# Patient Record
Sex: FEMALE | Race: WHITE | NOT HISPANIC OR LATINO | Employment: OTHER | URBAN - METROPOLITAN AREA
[De-identification: names, ages, dates, MRNs, and addresses within clinical notes are randomized per-mention and may not be internally consistent; named-entity substitution may affect disease eponyms.]

---

## 2017-02-06 ENCOUNTER — OFFICE VISIT (OUTPATIENT)
Dept: INTERNAL MEDICINE | Facility: CLINIC | Age: 82
End: 2017-02-06

## 2017-02-06 VITALS
TEMPERATURE: 98.1 F | DIASTOLIC BLOOD PRESSURE: 78 MMHG | OXYGEN SATURATION: 95 % | BODY MASS INDEX: 24.41 KG/M2 | HEIGHT: 64 IN | SYSTOLIC BLOOD PRESSURE: 128 MMHG | WEIGHT: 143 LBS | HEART RATE: 90 BPM | RESPIRATION RATE: 16 BRPM

## 2017-02-06 DIAGNOSIS — J30.2 SEASONAL ALLERGIC RHINITIS, UNSPECIFIED ALLERGIC RHINITIS TRIGGER: ICD-10-CM

## 2017-02-06 DIAGNOSIS — R73.01 IMPAIRED FASTING GLUCOSE: ICD-10-CM

## 2017-02-06 DIAGNOSIS — G47.09 OTHER INSOMNIA: ICD-10-CM

## 2017-02-06 DIAGNOSIS — I10 BENIGN ESSENTIAL HYPERTENSION: Primary | ICD-10-CM

## 2017-02-06 DIAGNOSIS — R73.03 PREDIABETES: ICD-10-CM

## 2017-02-06 DIAGNOSIS — K21.9 GASTROESOPHAGEAL REFLUX DISEASE WITHOUT ESOPHAGITIS: ICD-10-CM

## 2017-02-06 DIAGNOSIS — M85.80 OSTEOPENIA: ICD-10-CM

## 2017-02-06 DIAGNOSIS — M85.9 DISORDER OF BONE DENSITY AND STRUCTURE, UNSPECIFIED: ICD-10-CM

## 2017-02-06 DIAGNOSIS — F41.8 DEPRESSION WITH ANXIETY: ICD-10-CM

## 2017-02-06 PROBLEM — N81.10 BLADDER PROLAPSE, FEMALE, ACQUIRED: Status: ACTIVE | Noted: 2017-02-06

## 2017-02-06 PROBLEM — N28.1 RENAL CYST, LEFT: Status: ACTIVE | Noted: 2017-02-06

## 2017-02-06 LAB
ALBUMIN SERPL-MCNC: 4.7 G/DL (ref 3.5–5.2)
ALBUMIN/GLOB SERPL: 1.9 G/DL
ALP SERPL-CCNC: 82 U/L (ref 40–129)
ALT SERPL-CCNC: 13 U/L (ref 5–33)
AST SERPL-CCNC: 17 U/L (ref 5–32)
BASOPHILS # BLD AUTO: 0.03 10*3/MM3 (ref 0–0.2)
BASOPHILS NFR BLD AUTO: 0.5 % (ref 0–2)
BILIRUB SERPL-MCNC: 0.5 MG/DL (ref 0.2–1.2)
BUN SERPL-MCNC: 11 MG/DL (ref 8–23)
BUN/CREAT SERPL: 13.9 (ref 7–25)
CALCIUM SERPL-MCNC: 9.6 MG/DL (ref 8.8–10.5)
CHLORIDE SERPL-SCNC: 98 MMOL/L (ref 98–107)
CHOLEST SERPL-MCNC: 167 MG/DL (ref 0–200)
CHOLEST/HDLC SERPL: 1.99 {RATIO}
CO2 SERPL-SCNC: 28.4 MMOL/L (ref 22–29)
CREAT SERPL-MCNC: 0.79 MG/DL (ref 0.57–1)
EOSINOPHIL # BLD AUTO: 0.13 10*3/MM3 (ref 0.1–0.3)
EOSINOPHIL NFR BLD AUTO: 2.2 % (ref 0–4)
ERYTHROCYTE [DISTWIDTH] IN BLOOD BY AUTOMATED COUNT: 13.7 % (ref 11.5–14.5)
GLOBULIN SER CALC-MCNC: 2.5 GM/DL
GLUCOSE SERPL-MCNC: 101 MG/DL (ref 65–99)
HBA1C MFR BLD: 5.5 % (ref 4.8–5.6)
HCT VFR BLD AUTO: 44.2 % (ref 37–47)
HDLC SERPL-MCNC: 84 MG/DL (ref 40–60)
HGB BLD-MCNC: 14 G/DL (ref 12–16)
IMM GRANULOCYTES # BLD: 0 10*3/MM3 (ref 0–0.03)
IMM GRANULOCYTES NFR BLD: 0 % (ref 0–0.5)
LDLC SERPL CALC-MCNC: 69 MG/DL (ref 0–100)
LYMPHOCYTES # BLD AUTO: 2 10*3/MM3 (ref 0.6–4.8)
LYMPHOCYTES NFR BLD AUTO: 33.7 % (ref 20–45)
MCH RBC QN AUTO: 28.2 PG (ref 27–31)
MCHC RBC AUTO-ENTMCNC: 31.7 G/DL (ref 31–37)
MCV RBC AUTO: 89.1 FL (ref 81–99)
MONOCYTES # BLD AUTO: 0.51 10*3/MM3 (ref 0–1)
MONOCYTES NFR BLD AUTO: 8.6 % (ref 3–8)
NEUTROPHILS # BLD AUTO: 3.27 10*3/MM3 (ref 1.5–8.3)
NEUTROPHILS NFR BLD AUTO: 55 % (ref 45–70)
NRBC BLD AUTO-RTO: 0 /100 WBC (ref 0–0)
PLATELET # BLD AUTO: 298 10*3/MM3 (ref 140–500)
POTASSIUM SERPL-SCNC: 3.7 MMOL/L (ref 3.5–5.2)
PROT SERPL-MCNC: 7.2 G/DL (ref 6–8.5)
RBC # BLD AUTO: 4.96 10*6/MM3 (ref 4.2–5.4)
SODIUM SERPL-SCNC: 141 MMOL/L (ref 136–145)
TRIGL SERPL-MCNC: 70 MG/DL (ref 0–150)
TSH SERPL DL<=0.005 MIU/L-ACNC: 0.87 MIU/ML (ref 0.27–4.2)
VIT B12 SERPL-MCNC: 641 PG/ML (ref 211–946)
VLDLC SERPL CALC-MCNC: 14 MG/DL (ref 7–27)
WBC # BLD AUTO: 5.94 10*3/MM3 (ref 4.8–10.8)

## 2017-02-06 PROCEDURE — 99214 OFFICE O/P EST MOD 30 MIN: CPT | Performed by: FAMILY MEDICINE

## 2017-02-06 RX ORDER — FLUOXETINE 20 MG/1
20 TABLET, FILM COATED ORAL DAILY
Qty: 30 TABLET | Refills: 2 | Status: SHIPPED | OUTPATIENT
Start: 2017-02-06 | End: 2017-05-07

## 2017-02-06 RX ORDER — PEPPERMINT OIL
1 OIL (ML) MISCELLANEOUS AS NEEDED
COMMUNITY
End: 2018-02-12

## 2017-02-06 RX ORDER — MULTIVITAMIN
1 TABLET ORAL
COMMUNITY
End: 2017-02-06

## 2017-02-06 RX ORDER — RANITIDINE 150 MG/1
150 TABLET ORAL DAILY
COMMUNITY
End: 2017-06-30 | Stop reason: SDUPTHER

## 2017-02-06 RX ORDER — AMLODIPINE BESYLATE 10 MG/1
10 TABLET ORAL
COMMUNITY
End: 2017-09-25

## 2017-02-06 RX ORDER — FLUTICASONE PROPIONATE 50 MCG
2 SPRAY, SUSPENSION (ML) NASAL
COMMUNITY
Start: 2016-04-07 | End: 2017-02-06 | Stop reason: SDUPTHER

## 2017-02-06 RX ORDER — ZOLPIDEM TARTRATE 10 MG/1
TABLET ORAL
COMMUNITY
Start: 2016-12-13 | End: 2017-02-06 | Stop reason: SDUPTHER

## 2017-02-06 RX ORDER — LORAZEPAM 1 MG/1
1 TABLET ORAL EVERY 24 HOURS
COMMUNITY
Start: 2016-08-02 | End: 2017-03-06

## 2017-02-06 NOTE — PROGRESS NOTES
Subjective     Brittny Fay is a 82 y.o. female, who presents with a chief complaint of   Chief Complaint   Patient presents with   • Establish Care   • Hypertension   • Prediabetes   • Insomnia   • Depression       HPI     1. HTN.  She has been treated for HTN since about age 42.    2. Insomnia.  She alternates zolpidem and lorazepam at night when she develops some tolerance.  She has struggled with insomnia for years.  Trazodone and Lunesta didn't work well.    3. Depression with anxiety.  She has a lot of anxiety, mostly at night.  She took Paxil years ago.  She has had anxiety for years.  Denies SI.    The following portions of the patient's history were reviewed and updated as appropriate: allergies, current medications, past family history, past medical history, past social history, past surgical history and problem list.    Allergies: Amitriptyline; Bupropion; and Pneumococcal vaccines    Review of Systems   Constitutional: Negative.    HENT: Negative.    Eyes: Negative.    Respiratory: Negative.    Cardiovascular: Negative.    Gastrointestinal: Negative.    Endocrine: Negative.    Genitourinary: Negative.    Musculoskeletal: Negative.    Skin: Negative.    Allergic/Immunologic: Positive for environmental allergies.   Neurological: Negative.    Psychiatric/Behavioral: Positive for sleep disturbance. The patient is nervous/anxious.        Objective     Wt Readings from Last 3 Encounters:   02/06/17 143 lb (64.9 kg)   01/23/17 140 lb (63.5 kg)   10/03/16 143 lb (64.9 kg)     Temp Readings from Last 3 Encounters:   02/06/17 98.1 °F (36.7 °C) (Oral)   01/23/17 97.3 °F (36.3 °C) (Oral)   08/29/16 97.7 °F (36.5 °C) (Oral)     BP Readings from Last 3 Encounters:   02/06/17 128/78   01/23/17 144/88   08/29/16 142/84     Pulse Readings from Last 3 Encounters:   02/06/17 90   01/23/17 85   08/29/16 87     Body mass index is 24.55 kg/(m^2).  SpO2 Readings from Last 3 Encounters:   02/06/17 95%   01/23/17 96%    08/29/16 95%       Physical Exam   Constitutional: She is oriented to person, place, and time. She appears well-developed and well-nourished.   HENT:   Head: Normocephalic.   Mouth/Throat: Oropharynx is clear and moist.   Eyes: Conjunctivae and EOM are normal. Pupils are equal, round, and reactive to light.   Neck: Normal range of motion. Neck supple. No thyromegaly present.   Cardiovascular: Normal rate, regular rhythm and normal heart sounds.    Pulmonary/Chest: Effort normal and breath sounds normal.   Abdominal: Soft. Bowel sounds are normal. There is no hepatosplenomegaly.   Musculoskeletal: Normal range of motion. She exhibits no edema.   Lymphadenopathy:     She has no cervical adenopathy.   Neurological: She is alert and oriented to person, place, and time.   Skin: Skin is warm and dry. No rash noted.   Psychiatric: She has a normal mood and affect. Her behavior is normal.   Vitals reviewed.        Assessment/Plan   Brittny was seen today for establish care, hypertension, prediabetes, insomnia and depression.    Diagnoses and all orders for this visit:    Benign essential hypertension  -     Comprehensive Metabolic Panel  -     TSH  -     Lipid Panel With / Chol / HDL Ratio    Other insomnia    Seasonal allergic rhinitis, unspecified allergic rhinitis trigger    Depression with anxiety  -     CBC & Differential  -     FLUoxetine (PROzac) 20 MG tablet; Take 1 tablet by mouth Daily for 90 days.    Osteopenia  -     DEXA Bone Density Axial; Future    Disorder of bone density and structure, unspecified   -     DEXA Bone Density Axial; Future    Prediabetes  -     CBC & Differential  -     Hemoglobin A1c  -     Lipid Panel With / Chol / HDL Ratio  -     Vitamin B12    Gastroesophageal reflux disease without esophagitis    Prediabetes   -     CBC & Differential  -     Hemoglobin A1c  -     Lipid Panel With / Chol / HDL Ratio  -     Vitamin B12    Impaired fasting glucose   -     Hemoglobin A1c    1. HTN.   Controlled.  Labs ordered.    2. Insomnia.  Controlled.  Continue same.  Med management agreement and Carlos obtained.    3. TWIN.  Controlled with nasal steroid.    4. Depression with anxiety. Add fluoxetine 20 mg daily.  Anticipatory guidance given.    5. Osteopenia.  Due for dexa scan.  Continue calcium supplement.    6. Prediabetes.  Lifestyle measures. Check labs today.    7. GERD.  Controlled with ranitidine.  0      Outpatient Medications Prior to Visit   Medication Sig Dispense Refill   • amLODIPine (NORVASC) 10 MG tablet Take 1 tablet by mouth Every Morning. Indications: High Blood Pressure 90 tablet 0   • Estriol powder Compounding pharmacy     • fluticasone (FLONASE) 50 MCG/ACT nasal spray 2 sprays into each nostril daily. 1 each 2   • LORazepam (ATIVAN) 1 MG tablet Take 1 tablet by mouth Daily As Needed for anxiety. 90 tablet 0   • MYRBETRIQ 25 MG tablet sustained-release 24 hour Take 1 tablet by mouth daily.     • zolpidem (AMBIEN) 10 MG tablet Take 1 tablet by mouth At Night As Needed for sleep. For insomnia 90 tablet 0   • doxycycline (VIBRAMYCIN) 100 MG capsule 1 po bid 20 capsule 0     No facility-administered medications prior to visit.      New Medications Ordered This Visit   Medications   • FLUoxetine (PROzac) 20 MG tablet     Sig: Take 1 tablet by mouth Daily for 90 days.     Dispense:  30 tablet     Refill:  2     [unfilled]  Medications Discontinued During This Encounter   Medication Reason   • fluticasone (FLONASE) 50 MCG/ACT nasal spray Duplicate order   • Mirabegron ER 25 MG tablet sustained-release 24 hour Duplicate order   • zolpidem (AMBIEN) 10 MG tablet Duplicate order   • doxycycline (VIBRAMYCIN) 100 MG capsule    • Multiple Vitamin (MULTIVITAMIN) tablet          Return in about 3 months (around 5/6/2017).

## 2017-02-08 DIAGNOSIS — M85.88 OSTEOPENIA OF SPINE: Primary | ICD-10-CM

## 2017-02-10 ENCOUNTER — TELEPHONE (OUTPATIENT)
Dept: INTERNAL MEDICINE | Facility: CLINIC | Age: 82
End: 2017-02-10

## 2017-02-10 NOTE — TELEPHONE ENCOUNTER
Patient advised.  sj    ----- Message from Josie Kapadia MA sent at 2/8/2017  8:07 AM EST -----      ----- Message -----     From: Remedios Sorto MD     Sent: 2/6/2017   2:26 PM       To: [facility] Gonzales[address] [place] [address]    Please call the patient regarding her result.  Labs are normal including A1c.  Thanks.

## 2017-02-17 ENCOUNTER — HOSPITAL ENCOUNTER (OUTPATIENT)
Dept: BONE DENSITY | Facility: HOSPITAL | Age: 82
Discharge: HOME | End: 2017-02-17
Attending: FAMILY MEDICINE | Admitting: FAMILY MEDICINE

## 2017-02-17 PROCEDURE — 77080 DXA BONE DENSITY AXIAL: CPT

## 2017-02-27 ENCOUNTER — TELEPHONE (OUTPATIENT)
Dept: INTERNAL MEDICINE | Facility: CLINIC | Age: 82
End: 2017-02-27

## 2017-02-27 NOTE — TELEPHONE ENCOUNTER
Patient had problems with [employer] email system.  I referred her to the [employer] help line.     ----- Message from Lima Contreras sent at 2017  3:24 PM EST -----  Regarding: QUESTION  Contact: [phone]  :  39    AURY PATIENT    TALKED TO MORENO NÚÑEZ ABOUT LAB RESULTS AND HAS QUESTIONS. PLEASE CALL.

## 2017-03-06 ENCOUNTER — TELEPHONE (OUTPATIENT)
Dept: INTERNAL MEDICINE | Facility: CLINIC | Age: 82
End: 2017-03-06

## 2017-03-06 DIAGNOSIS — F41.1 GENERALIZED ANXIETY DISORDER: ICD-10-CM

## 2017-03-06 RX ORDER — LORAZEPAM 1 MG/1
1 TABLET ORAL DAILY PRN
Qty: 90 TABLET | Refills: 1 | Status: SHIPPED | OUTPATIENT
Start: 2017-03-06 | End: 2017-08-09 | Stop reason: SDUPTHER

## 2017-03-06 NOTE — TELEPHONE ENCOUNTER
----- Message from Gladis Garner MA sent at 3/6/2017  8:52 AM EST -----   LOV  2/6/17  NARC AND BRYAN     ----- Message -----     From: Rula Dye     Sent: 3/6/2017   8:27 AM       To: [facility]    RX REFILL    LORAZEPAM 1MG  #90  LAST FILL DATE EARYL December  PT HAS 10 PILLS LEFT  HARJIT JEFFERS  CALL BACK [phone]    PT USES [facility], PHONE NUMBER [phone].     FAXED TO   [facility],   NEXT  OV  5/10/17

## 2017-03-30 ENCOUNTER — TELEPHONE (OUTPATIENT)
Dept: INTERNAL MEDICINE | Facility: CLINIC | Age: 82
End: 2017-03-30

## 2017-03-30 RX ORDER — AMLODIPINE BESYLATE 10 MG/1
10 TABLET ORAL EVERY MORNING
Qty: 90 TABLET | Refills: 1 | Status: SHIPPED | OUTPATIENT
Start: 2017-03-30 | End: 2017-03-30 | Stop reason: SDUPTHER

## 2017-03-30 RX ORDER — AMLODIPINE BESYLATE 10 MG/1
10 TABLET ORAL EVERY MORNING
Qty: 90 TABLET | Refills: 1 | Status: SHIPPED | OUTPATIENT
Start: 2017-03-30 | End: 2017-05-10

## 2017-03-30 NOTE — TELEPHONE ENCOUNTER
----- Message from Rula Dye sent at 3/30/2017  8:05 AM EDT -----  rx refill    amlodipine 10 mg  #90  Last fill date 1/10/2017  Has 10 pills left  Autumn jett  Call back is [phone]      SENT TO [facility]

## 2017-03-30 NOTE — TELEPHONE ENCOUNTER
----- Message from Lima Contreras sent at 3/30/2017  2:56 PM EDT -----  Regarding: DIFFERENT PHARMACY  Contact: [phone]  :  39  AURY PATIENT    PATIENT NEEDS AMLODIPINE, 10 MG TABLET, SENT TO [facility]. QUANTITY OF 90      done

## 2017-05-10 ENCOUNTER — OFFICE VISIT (OUTPATIENT)
Dept: INTERNAL MEDICINE | Facility: CLINIC | Age: 82
End: 2017-05-10

## 2017-05-10 VITALS
BODY MASS INDEX: 24.89 KG/M2 | WEIGHT: 145 LBS | HEART RATE: 68 BPM | TEMPERATURE: 98.7 F | SYSTOLIC BLOOD PRESSURE: 148 MMHG | OXYGEN SATURATION: 98 % | DIASTOLIC BLOOD PRESSURE: 82 MMHG

## 2017-05-10 DIAGNOSIS — I10 BENIGN ESSENTIAL HYPERTENSION: Primary | ICD-10-CM

## 2017-05-10 DIAGNOSIS — N32.81 OVERACTIVE BLADDER: ICD-10-CM

## 2017-05-10 DIAGNOSIS — F41.8 DEPRESSION WITH ANXIETY: ICD-10-CM

## 2017-05-10 DIAGNOSIS — M85.80 OSTEOPENIA: ICD-10-CM

## 2017-05-10 DIAGNOSIS — J30.2 SEASONAL ALLERGIC RHINITIS, UNSPECIFIED ALLERGIC RHINITIS TRIGGER: ICD-10-CM

## 2017-05-10 DIAGNOSIS — G47.00 INSOMNIA, UNSPECIFIED TYPE: ICD-10-CM

## 2017-05-10 DIAGNOSIS — K21.9 GASTROESOPHAGEAL REFLUX DISEASE WITHOUT ESOPHAGITIS: ICD-10-CM

## 2017-05-10 DIAGNOSIS — N28.1 RENAL CYST, LEFT: ICD-10-CM

## 2017-05-10 PROCEDURE — 99214 OFFICE O/P EST MOD 30 MIN: CPT | Performed by: FAMILY MEDICINE

## 2017-05-15 ENCOUNTER — RESULTS ENCOUNTER (OUTPATIENT)
Dept: INTERNAL MEDICINE | Facility: CLINIC | Age: 82
End: 2017-05-15

## 2017-05-15 DIAGNOSIS — K21.9 GASTROESOPHAGEAL REFLUX DISEASE WITHOUT ESOPHAGITIS: ICD-10-CM

## 2017-05-15 DIAGNOSIS — I10 BENIGN ESSENTIAL HYPERTENSION: ICD-10-CM

## 2017-05-19 ENCOUNTER — HOSPITAL ENCOUNTER (OUTPATIENT)
Dept: ULTRASOUND IMAGING | Facility: HOSPITAL | Age: 82
Discharge: HOME | End: 2017-05-19
Attending: FAMILY MEDICINE | Admitting: FAMILY MEDICINE

## 2017-05-19 ENCOUNTER — APPOINTMENT (OUTPATIENT)
Dept: ULTRASOUND IMAGING | Facility: HOSPITAL | Age: 82
End: 2017-05-19
Attending: FAMILY MEDICINE

## 2017-05-19 DIAGNOSIS — N28.1 RENAL CYST, LEFT: ICD-10-CM

## 2017-05-19 PROCEDURE — 76775 US EXAM ABDO BACK WALL LIM: CPT

## 2017-05-22 ENCOUNTER — TELEPHONE (OUTPATIENT)
Dept: INTERNAL MEDICINE | Facility: CLINIC | Age: 82
End: 2017-05-22

## 2017-05-23 ENCOUNTER — TELEPHONE (OUTPATIENT)
Dept: INTERNAL MEDICINE | Facility: CLINIC | Age: 82
End: 2017-05-23

## 2017-06-30 RX ORDER — RANITIDINE 150 MG/1
150 TABLET ORAL DAILY
Qty: 90 TABLET | Refills: 3 | Status: SHIPPED | OUTPATIENT
Start: 2017-06-30 | End: 2017-07-11 | Stop reason: SDUPTHER

## 2017-07-05 ENCOUNTER — TELEPHONE (OUTPATIENT)
Dept: INTERNAL MEDICINE | Facility: CLINIC | Age: 82
End: 2017-07-05

## 2017-07-05 NOTE — TELEPHONE ENCOUNTER
Pt called to say that her [facility] said that her insurance would not cover Xantac 150mg but if the RX was written for 300mg the insurance will pay. She said that she could cut them into. You can get them over the counter but they are so much more expensive than if she could get insurance to pay.dg  Can you write her a Rx.Thanks   sally

## 2017-07-11 RX ORDER — RANITIDINE 300 MG/1
300 TABLET ORAL DAILY
Qty: 90 TABLET | Refills: 3 | Status: SHIPPED | OUTPATIENT
Start: 2017-07-11 | End: 2017-11-07

## 2017-08-02 LAB
ALBUMIN SERPL-MCNC: 4.5 G/DL (ref 3.5–5.2)
ALBUMIN/GLOB SERPL: 2 G/DL
ALP SERPL-CCNC: 81 U/L (ref 40–129)
ALT SERPL-CCNC: 17 U/L (ref 5–33)
AST SERPL-CCNC: 20 U/L (ref 5–32)
BASOPHILS # BLD AUTO: 0.06 10*3/MM3 (ref 0–0.2)
BASOPHILS NFR BLD AUTO: 0.9 % (ref 0–2)
BILIRUB SERPL-MCNC: 0.5 MG/DL (ref 0.2–1.2)
BUN SERPL-MCNC: 18 MG/DL (ref 8–23)
BUN/CREAT SERPL: 21.7 (ref 7–25)
CALCIUM SERPL-MCNC: 9.1 MG/DL (ref 8.8–10.5)
CHLORIDE SERPL-SCNC: 101 MMOL/L (ref 98–107)
CHOLEST SERPL-MCNC: 192 MG/DL (ref 0–200)
CHOLEST/HDLC SERPL: 2.56 {RATIO}
CO2 SERPL-SCNC: 27.8 MMOL/L (ref 22–29)
CREAT SERPL-MCNC: 0.83 MG/DL (ref 0.57–1)
EOSINOPHIL # BLD AUTO: 0.2 10*3/MM3 (ref 0.1–0.3)
EOSINOPHIL NFR BLD AUTO: 3.1 % (ref 0–4)
ERYTHROCYTE [DISTWIDTH] IN BLOOD BY AUTOMATED COUNT: 14.1 % (ref 11.5–14.5)
GLOBULIN SER CALC-MCNC: 2.2 GM/DL
GLUCOSE SERPL-MCNC: 107 MG/DL (ref 65–99)
HCT VFR BLD AUTO: 44.5 % (ref 37–47)
HDLC SERPL-MCNC: 75 MG/DL (ref 40–60)
HGB BLD-MCNC: 14.2 G/DL (ref 12–16)
IMM GRANULOCYTES # BLD: 0.01 10*3/MM3 (ref 0–0.03)
IMM GRANULOCYTES NFR BLD: 0.2 % (ref 0–0.5)
LDLC SERPL CALC-MCNC: 102 MG/DL (ref 0–100)
LYMPHOCYTES # BLD AUTO: 2.57 10*3/MM3 (ref 0.6–4.8)
LYMPHOCYTES NFR BLD AUTO: 39.8 % (ref 20–45)
MCH RBC QN AUTO: 28.3 PG (ref 27–31)
MCHC RBC AUTO-ENTMCNC: 31.9 G/DL (ref 31–37)
MCV RBC AUTO: 88.6 FL (ref 81–99)
MONOCYTES # BLD AUTO: 0.59 10*3/MM3 (ref 0–1)
MONOCYTES NFR BLD AUTO: 9.1 % (ref 3–8)
NEUTROPHILS # BLD AUTO: 3.02 10*3/MM3 (ref 1.5–8.3)
NEUTROPHILS NFR BLD AUTO: 46.9 % (ref 45–70)
NRBC BLD AUTO-RTO: 0 /100 WBC (ref 0–0)
PLATELET # BLD AUTO: 297 10*3/MM3 (ref 140–500)
POTASSIUM SERPL-SCNC: 4 MMOL/L (ref 3.5–5.2)
PROT SERPL-MCNC: 6.7 G/DL (ref 6–8.5)
RBC # BLD AUTO: 5.02 10*6/MM3 (ref 4.2–5.4)
SODIUM SERPL-SCNC: 143 MMOL/L (ref 136–145)
TRIGL SERPL-MCNC: 77 MG/DL (ref 0–150)
TSH SERPL DL<=0.005 MIU/L-ACNC: 1.15 MIU/ML (ref 0.27–4.2)
VLDLC SERPL CALC-MCNC: 15.4 MG/DL (ref 7–27)
WBC # BLD AUTO: 6.45 10*3/MM3 (ref 4.8–10.8)

## 2017-08-08 ENCOUNTER — TRANSCRIBE ORDERS (OUTPATIENT)
Dept: ADMINISTRATIVE | Facility: HOSPITAL | Age: 82
End: 2017-08-08

## 2017-08-08 DIAGNOSIS — Z12.31 VISIT FOR SCREENING MAMMOGRAM: Primary | ICD-10-CM

## 2017-08-09 ENCOUNTER — OFFICE VISIT (OUTPATIENT)
Dept: INTERNAL MEDICINE | Facility: CLINIC | Age: 82
End: 2017-08-09

## 2017-08-09 VITALS
OXYGEN SATURATION: 98 % | HEART RATE: 83 BPM | DIASTOLIC BLOOD PRESSURE: 88 MMHG | SYSTOLIC BLOOD PRESSURE: 130 MMHG | WEIGHT: 142 LBS | TEMPERATURE: 97.7 F | HEIGHT: 64 IN | BODY MASS INDEX: 24.24 KG/M2

## 2017-08-09 DIAGNOSIS — K21.9 GASTROESOPHAGEAL REFLUX DISEASE WITHOUT ESOPHAGITIS: ICD-10-CM

## 2017-08-09 DIAGNOSIS — M85.80 OSTEOPENIA: ICD-10-CM

## 2017-08-09 DIAGNOSIS — J30.2 SEASONAL ALLERGIC RHINITIS, UNSPECIFIED ALLERGIC RHINITIS TRIGGER: ICD-10-CM

## 2017-08-09 DIAGNOSIS — G47.09 OTHER INSOMNIA: ICD-10-CM

## 2017-08-09 DIAGNOSIS — I10 BENIGN ESSENTIAL HYPERTENSION: Primary | ICD-10-CM

## 2017-08-09 DIAGNOSIS — G47.00 INSOMNIA, UNSPECIFIED TYPE: ICD-10-CM

## 2017-08-09 DIAGNOSIS — F41.1 GENERALIZED ANXIETY DISORDER: ICD-10-CM

## 2017-08-09 DIAGNOSIS — N32.81 OVERACTIVE BLADDER: ICD-10-CM

## 2017-08-09 DIAGNOSIS — N28.1 RENAL CYST, LEFT: ICD-10-CM

## 2017-08-09 DIAGNOSIS — R73.01 ELEVATED FASTING GLUCOSE: ICD-10-CM

## 2017-08-09 PROCEDURE — 99214 OFFICE O/P EST MOD 30 MIN: CPT | Performed by: FAMILY MEDICINE

## 2017-08-09 RX ORDER — ZOLPIDEM TARTRATE 10 MG/1
10 TABLET ORAL NIGHTLY PRN
Qty: 90 TABLET | Refills: 1 | OUTPATIENT
Start: 2017-08-09 | End: 2017-08-09 | Stop reason: SDUPTHER

## 2017-08-09 RX ORDER — LORAZEPAM 1 MG/1
1 TABLET ORAL DAILY PRN
Qty: 90 TABLET | Refills: 1 | Status: SHIPPED | OUTPATIENT
Start: 2017-08-09 | End: 2018-04-05 | Stop reason: SDUPTHER

## 2017-08-09 RX ORDER — ZOLPIDEM TARTRATE 10 MG/1
10 TABLET ORAL NIGHTLY PRN
Qty: 90 TABLET | Refills: 1 | Status: SHIPPED | OUTPATIENT
Start: 2017-08-09 | End: 2018-02-12 | Stop reason: SDUPTHER

## 2017-08-09 NOTE — PROGRESS NOTES
Subjective     Brittny Fay is a 82 y.o. female, who presents with a chief complaint of   Chief Complaint   Patient presents with   • Heartburn   • Hypertension       Heartburn     Hypertension          1. HTN. Tolerating amlodipine.  Denies chest pain, dizziness.    2. OAB.  Pt reports she is doing great after rectocele and cystocele repair surgery 7 weeks ago by Dr. Vgiil.  Off Myrbetriq.    3. Insomnia.  She is still requiring medication to obtain adequate sleep.    The following portions of the patient's history were reviewed and updated as appropriate: allergies, current medications, past family history, past medical history, past social history, past surgical history and problem list.    Allergies: Amitriptyline; Bupropion; and Pneumococcal vaccines    Review of Systems   Constitutional: Negative.    HENT: Negative.    Eyes: Negative.    Respiratory: Negative.    Cardiovascular: Negative.    Gastrointestinal: Negative.    Endocrine: Negative.    Genitourinary: Negative.    Musculoskeletal: Negative.    Skin: Negative.    Allergic/Immunologic: Positive for environmental allergies.   Neurological: Negative.    Psychiatric/Behavioral: Positive for sleep disturbance.       Objective     Wt Readings from Last 3 Encounters:   08/09/17 142 lb (64.4 kg)   05/10/17 145 lb (65.8 kg)   02/06/17 143 lb (64.9 kg)     Temp Readings from Last 3 Encounters:   08/09/17 97.7 °F (36.5 °C)   05/10/17 98.7 °F (37.1 °C)   02/06/17 98.1 °F (36.7 °C) (Oral)     BP Readings from Last 3 Encounters:   08/09/17 130/88   05/10/17 148/82   02/06/17 128/78     Pulse Readings from Last 3 Encounters:   08/09/17 83   05/10/17 68   02/06/17 90     Body mass index is 24.37 kg/(m^2).  SpO2 Readings from Last 3 Encounters:   08/09/17 98%   05/10/17 98%   02/06/17 95%       Physical Exam   Constitutional: She is oriented to person, place, and time. She appears well-developed and well-nourished.   HENT:   Head: Normocephalic.   Mouth/Throat:  Oropharynx is clear and moist.   Eyes: Conjunctivae and EOM are normal. Pupils are equal, round, and reactive to light.   Neck: Normal range of motion. Neck supple. No thyromegaly present.   Cardiovascular: Normal rate, regular rhythm and normal heart sounds.    Pulmonary/Chest: Effort normal and breath sounds normal.   Abdominal: Soft. Bowel sounds are normal. There is no hepatosplenomegaly.   Musculoskeletal: Normal range of motion. She exhibits no edema.   Lymphadenopathy:     She has no cervical adenopathy.   Neurological: She is alert and oriented to person, place, and time.   Skin: Skin is warm and dry. No rash noted.   Psychiatric: She has a normal mood and affect. Her behavior is normal.   Vitals reviewed.      Results for orders placed or performed in visit on 05/15/17   Comprehensive Metabolic Panel   Result Value Ref Range    Glucose 107 (H) 65 - 99 mg/dL    BUN 18 8 - 23 mg/dL    Creatinine 0.83 0.57 - 1.00 mg/dL    eGFR Non African Am 66 >60 mL/min/1.73    eGFR African Am 80 >60 mL/min/1.73    BUN/Creatinine Ratio 21.7 7.0 - 25.0    Sodium 143 136 - 145 mmol/L    Potassium 4.0 3.5 - 5.2 mmol/L    Chloride 101 98 - 107 mmol/L    Total CO2 27.8 22.0 - 29.0 mmol/L    Calcium 9.1 8.8 - 10.5 mg/dL    Total Protein 6.7 6.0 - 8.5 g/dL    Albumin 4.50 3.50 - 5.20 g/dL    Globulin 2.2 gm/dL    A/G Ratio 2.0 g/dL    Total Bilirubin 0.5 0.2 - 1.2 mg/dL    Alkaline Phosphatase 81 40 - 129 U/L    AST (SGOT) 20 5 - 32 U/L    ALT (SGPT) 17 5 - 33 U/L   Lipid Panel With / Chol / HDL Ratio   Result Value Ref Range    Total Cholesterol 192 0 - 200 mg/dL    Triglycerides 77 0 - 150 mg/dL    HDL Cholesterol 75 (H) 40 - 60 mg/dL    VLDL Cholesterol 15.4 7 - 27 mg/dL    LDL Cholesterol  102 (H) 0 - 100 mg/dL    Chol/HDL Ratio 2.56    TSH   Result Value Ref Range    TSH 1.150 0.270 - 4.200 mIU/mL   CBC & Differential   Result Value Ref Range    WBC 6.45 4.80 - 10.80 10*3/mm3    RBC 5.02 4.20 - 5.40 10*6/mm3    Hemoglobin 14.2  12.0 - 16.0 g/dL    Hematocrit 44.5 37.0 - 47.0 %    MCV 88.6 81.0 - 99.0 fL    MCH 28.3 27.0 - 31.0 pg    MCHC 31.9 31.0 - 37.0 g/dL    RDW 14.1 11.5 - 14.5 %    Platelets 297 140 - 500 10*3/mm3    Neutrophil Rel % 46.9 45.0 - 70.0 %    Lymphocyte Rel % 39.8 20.0 - 45.0 %    Monocyte Rel % 9.1 (H) 3.0 - 8.0 %    Eosinophil Rel % 3.1 0.0 - 4.0 %    Basophil Rel % 0.9 0.0 - 2.0 %    Neutrophils Absolute 3.02 1.50 - 8.30 10*3/mm3    Lymphocytes Absolute 2.57 0.60 - 4.80 10*3/mm3    Monocytes Absolute 0.59 0.00 - 1.00 10*3/mm3    Eosinophils Absolute 0.20 0.10 - 0.30 10*3/mm3    Basophils Absolute 0.06 0.00 - 0.20 10*3/mm3    Immature Granulocyte Rel % 0.2 0.0 - 0.5 %    Immature Grans Absolute 0.01 0.00 - 0.03 10*3/mm3    nRBC 0.0 0.0 - 0.0 /100 WBC       Assessment/Plan   Brittny was seen today for heartburn and hypertension.    Diagnoses and all orders for this visit:    Benign essential hypertension  -     Comprehensive Metabolic Panel; Future  -     Lipid Panel With / Chol / HDL Ratio; Future  -     TSH; Future    Seasonal allergic rhinitis, unspecified allergic rhinitis trigger    Gastroesophageal reflux disease without esophagitis  -     CBC & Differential; Future    Overactive bladder    Osteopenia    Insomnia, unspecified type    Elevated fasting glucose  -     Hemoglobin A1c; Future  -     Vitamin B12; Future    Renal cyst, left      1. HTN.  Continue amlodipine.  Labs reviewed.  Monitor home blood pressures.    2. TWIN.  Controlled with nasal steroid.    3. GERD.  Controlled with ranitidine.    4. OAB/bladder prolapse.  Resolved after recent surgery.    5. Osteopenia.  Continue calcium and vitamin D, weight bearing exercise.  Next dexa scn 2/2019.    6. Insomnia.  Alternates lorazepam and zolpidem to prevent tolerance.  Med management agreement and Carlos UTD.    7. Elevated fasting glucose.  Mild.  Lifestyle measures. Check A1c next time.    8. Left renal cyst.  Previously reported as complex.  Read as simple  this last time on u/s.  We will plan to repeat this in May of 2018.      Outpatient Medications Prior to Visit   Medication Sig Dispense Refill   • amLODIPine (NORVASC) 10 MG tablet Take 10 mg by mouth.     • Calcium Carbonate-Vitamin D (CALCIUM 500 + D) 500-125 MG-UNIT tablet Take  by mouth.     • Estriol powder Compounding pharmacy     • fluticasone (FLONASE) 50 MCG/ACT nasal spray 2 sprays into each nostril daily. 1 each 2   • peppermint oil liquid 1 capsule As Needed.     • raNITIdine (ZANTAC) 300 MG tablet Take 1 tablet by mouth Daily. 90 tablet 3   • LORazepam (ATIVAN) 1 MG tablet Take 1 tablet by mouth Daily As Needed for anxiety. 90 tablet 1   • zolpidem (AMBIEN) 10 MG tablet Take 1 tablet by mouth At Night As Needed for sleep. For insomnia 90 tablet 0   • MYRBETRIQ 25 MG tablet sustained-release 24 hour Take 1 tablet by mouth daily.       No facility-administered medications prior to visit.      New Medications Ordered This Visit   Medications   • LORazepam (ATIVAN) 1 MG tablet     Sig: Take 1 tablet by mouth Daily As Needed for Anxiety.     Dispense:  90 tablet     Refill:  1   • zolpidem (AMBIEN) 10 MG tablet     Sig: Take 1 tablet by mouth At Night As Needed for Sleep. For insomnia     Dispense:  90 tablet     Refill:  1     [unfilled]  Medications Discontinued During This Encounter   Medication Reason   • MYRBETRIQ 25 MG tablet sustained-release 24 hour Therapy completed   • LORazepam (ATIVAN) 1 MG tablet Reorder   • zolpidem (AMBIEN) 10 MG tablet Reorder         Return in about 6 months (around 2/9/2018).

## 2017-08-14 ENCOUNTER — RESULTS ENCOUNTER (OUTPATIENT)
Dept: INTERNAL MEDICINE | Facility: CLINIC | Age: 82
End: 2017-08-14

## 2017-08-14 DIAGNOSIS — I10 BENIGN ESSENTIAL HYPERTENSION: ICD-10-CM

## 2017-08-14 DIAGNOSIS — K21.9 GASTROESOPHAGEAL REFLUX DISEASE WITHOUT ESOPHAGITIS: ICD-10-CM

## 2017-08-14 DIAGNOSIS — R73.01 ELEVATED FASTING GLUCOSE: ICD-10-CM

## 2017-08-25 ENCOUNTER — HOSPITAL ENCOUNTER (OUTPATIENT)
Dept: MAMMOGRAPHY | Facility: HOSPITAL | Age: 82
Discharge: HOME | End: 2017-08-25
Attending: FAMILY MEDICINE | Admitting: FAMILY MEDICINE

## 2017-08-25 DIAGNOSIS — Z12.31 VISIT FOR SCREENING MAMMOGRAM: ICD-10-CM

## 2017-08-25 PROCEDURE — G0202 SCR MAMMO BI INCL CAD: HCPCS

## 2017-08-28 RX ORDER — AMLODIPINE BESYLATE 10 MG/1
TABLET ORAL
Qty: 90 TABLET | Refills: 3 | Status: SHIPPED | OUTPATIENT
Start: 2017-08-28 | End: 2018-08-13 | Stop reason: SDUPTHER

## 2017-09-21 ENCOUNTER — TELEPHONE (OUTPATIENT)
Dept: INTERNAL MEDICINE | Facility: CLINIC | Age: 82
End: 2017-09-21

## 2017-09-21 NOTE — TELEPHONE ENCOUNTER
----- Message from Josie Kapadia MA sent at 9/20/2017  3:27 PM EDT -----  Regarding: FW: HOSPITAL FOLLOW UP       ----- Message -----     From: Grace Winters     Sent: 9/20/2017   2:57 PM       To: [facility] [place]  Subject: HOSPITAL FOLLOW UP                               AURY    PT WAS RELEASED FROM [facility] 9/18 AND SAYS SHE IS TO FOLLOW UP WITH PCP.  WANTS TO BE WORKED IN ON Thursday, 9/21.  Doesn't feel like she can wait until next opening on 9/28.    Patient says she isn't as bad as when she presented to [facility] ER but is feeling some of the same symptoms.    [phone]    Pt coming in Monday to be seen 9/25

## 2017-09-25 ENCOUNTER — OFFICE VISIT (OUTPATIENT)
Dept: INTERNAL MEDICINE | Facility: CLINIC | Age: 82
End: 2017-09-25

## 2017-09-25 VITALS
TEMPERATURE: 98.3 F | HEIGHT: 64 IN | BODY MASS INDEX: 23.6 KG/M2 | HEART RATE: 89 BPM | OXYGEN SATURATION: 97 % | SYSTOLIC BLOOD PRESSURE: 120 MMHG | WEIGHT: 138.2 LBS | DIASTOLIC BLOOD PRESSURE: 70 MMHG

## 2017-09-25 DIAGNOSIS — K52.9 ENTERITIS: Primary | ICD-10-CM

## 2017-09-25 DIAGNOSIS — R19.7 DIARRHEA, UNSPECIFIED TYPE: ICD-10-CM

## 2017-09-25 DIAGNOSIS — Z23 NEED FOR INFLUENZA VACCINATION: ICD-10-CM

## 2017-09-25 PROCEDURE — 99213 OFFICE O/P EST LOW 20 MIN: CPT | Performed by: FAMILY MEDICINE

## 2017-09-25 PROCEDURE — G0008 ADMIN INFLUENZA VIRUS VAC: HCPCS | Performed by: FAMILY MEDICINE

## 2017-09-25 RX ORDER — RIBOFLAVIN (VITAMIN B2) 100 MG
100 TABLET ORAL DAILY
COMMUNITY

## 2017-09-25 RX ORDER — DIPHENOXYLATE HYDROCHLORIDE AND ATROPINE SULFATE 2.5; .025 MG/1; MG/1
1 TABLET ORAL
COMMUNITY
Start: 2017-09-18 | End: 2018-02-12

## 2017-09-25 NOTE — PROGRESS NOTES
Subjective     Brittny Fay is a 82 y.o. female, who presents with a chief complaint of   Chief Complaint   Patient presents with   • Follow-up     Hospital  [facility]       HPI     Pt here for hospital follow-up.  She was hospitalized at [facility] 9/16/17-9/18/17 with acute infectious enteritis.  She is still having diarrhea but has improved.  Has advanced her diet to crackers and cream of wheat and chicken noodle soup.  Had some abdominal pain last night and used a heating pad.  She would like to see a gastroenterologist.  She is worried about possibly having underlying Crohn's disease.  She has started a probiotic.    The following portions of the patient's history were reviewed and updated as appropriate: allergies, current medications, past family history, past medical history, past social history, past surgical history and problem list.    Allergies: Amitriptyline; Bupropion; and Pneumococcal vaccines    Review of Systems   Constitutional: Positive for appetite change.   HENT: Negative.    Eyes: Negative.    Respiratory: Negative.    Cardiovascular: Negative.    Gastrointestinal: Positive for abdominal pain and diarrhea.   Endocrine: Negative.    Genitourinary: Negative.    Musculoskeletal: Negative.    Skin: Negative.    Allergic/Immunologic: Negative.    Neurological: Negative.    Hematological: Negative.    Psychiatric/Behavioral: Negative.        Objective     Wt Readings from Last 3 Encounters:   09/25/17 138 lb 3.2 oz (62.7 kg)   08/09/17 142 lb (64.4 kg)   05/10/17 145 lb (65.8 kg)     Temp Readings from Last 3 Encounters:   09/25/17 98.3 °F (36.8 °C)   08/09/17 97.7 °F (36.5 °C)   05/10/17 98.7 °F (37.1 °C)     BP Readings from Last 3 Encounters:   09/25/17 120/70   08/09/17 130/88   05/10/17 148/82     Pulse Readings from Last 3 Encounters:   09/25/17 89   08/09/17 83   05/10/17 68     Body mass index is 23.72 kg/(m^2).  SpO2 Readings from Last 3 Encounters:   09/25/17 97%   08/09/17  98%   05/10/17 98%       Physical Exam   Constitutional: She is oriented to person, place, and time. She appears well-developed and well-nourished. No distress.   HENT:   Head: Normocephalic.   Mouth/Throat: Oropharynx is clear and moist.   Eyes: Conjunctivae and EOM are normal. Pupils are equal, round, and reactive to light.   Neck: Normal range of motion. Neck supple. No thyromegaly present.   Cardiovascular: Normal rate, regular rhythm and normal heart sounds.    Pulmonary/Chest: Effort normal and breath sounds normal.   Abdominal: Soft. Bowel sounds are normal. There is no hepatosplenomegaly.   Musculoskeletal: Normal range of motion. She exhibits no edema.   Lymphadenopathy:     She has no cervical adenopathy.   Neurological: She is alert and oriented to person, place, and time.   Skin: Skin is warm and dry. No rash noted.   Psychiatric: She has a normal mood and affect. Her behavior is normal.   Vitals reviewed.      Results for orders placed or performed in visit on 05/15/17   Comprehensive Metabolic Panel   Result Value Ref Range    Glucose 107 (H) 65 - 99 mg/dL    BUN 18 8 - 23 mg/dL    Creatinine 0.83 0.57 - 1.00 mg/dL    eGFR Non African Am 66 >60 mL/min/1.73    eGFR African Am 80 >60 mL/min/1.73    BUN/Creatinine Ratio 21.7 7.0 - 25.0    Sodium 143 136 - 145 mmol/L    Potassium 4.0 3.5 - 5.2 mmol/L    Chloride 101 98 - 107 mmol/L    Total CO2 27.8 22.0 - 29.0 mmol/L    Calcium 9.1 8.8 - 10.5 mg/dL    Total Protein 6.7 6.0 - 8.5 g/dL    Albumin 4.50 3.50 - 5.20 g/dL    Globulin 2.2 gm/dL    A/G Ratio 2.0 g/dL    Total Bilirubin 0.5 0.2 - 1.2 mg/dL    Alkaline Phosphatase 81 40 - 129 U/L    AST (SGOT) 20 5 - 32 U/L    ALT (SGPT) 17 5 - 33 U/L   Lipid Panel With / Chol / HDL Ratio   Result Value Ref Range    Total Cholesterol 192 0 - 200 mg/dL    Triglycerides 77 0 - 150 mg/dL    HDL Cholesterol 75 (H) 40 - 60 mg/dL    VLDL Cholesterol 15.4 7 - 27 mg/dL    LDL Cholesterol  102 (H) 0 - 100 mg/dL    Chol/HDL  Ratio 2.56    TSH   Result Value Ref Range    TSH 1.150 0.270 - 4.200 mIU/mL   CBC & Differential   Result Value Ref Range    WBC 6.45 4.80 - 10.80 10*3/mm3    RBC 5.02 4.20 - 5.40 10*6/mm3    Hemoglobin 14.2 12.0 - 16.0 g/dL    Hematocrit 44.5 37.0 - 47.0 %    MCV 88.6 81.0 - 99.0 fL    MCH 28.3 27.0 - 31.0 pg    MCHC 31.9 31.0 - 37.0 g/dL    RDW 14.1 11.5 - 14.5 %    Platelets 297 140 - 500 10*3/mm3    Neutrophil Rel % 46.9 45.0 - 70.0 %    Lymphocyte Rel % 39.8 20.0 - 45.0 %    Monocyte Rel % 9.1 (H) 3.0 - 8.0 %    Eosinophil Rel % 3.1 0.0 - 4.0 %    Basophil Rel % 0.9 0.0 - 2.0 %    Neutrophils Absolute 3.02 1.50 - 8.30 10*3/mm3    Lymphocytes Absolute 2.57 0.60 - 4.80 10*3/mm3    Monocytes Absolute 0.59 0.00 - 1.00 10*3/mm3    Eosinophils Absolute 0.20 0.10 - 0.30 10*3/mm3    Basophils Absolute 0.06 0.00 - 0.20 10*3/mm3    Immature Granulocyte Rel % 0.2 0.0 - 0.5 %    Immature Grans Absolute 0.01 0.00 - 0.03 10*3/mm3    nRBC 0.0 0.0 - 0.0 /100 WBC       Assessment/Plan   Brittny was seen today for follow-up.    Diagnoses and all orders for this visit:    Enteritis  -     Ambulatory Referral to Gastroenterology    Diarrhea, unspecified type      Enteritis.  Improving.  Add fiber supplement to add bulk to stool.  Continue probiotic.  Advance diet as tolerate.  GI consult as requested to r/u underlying cause.    Outpatient Medications Prior to Visit   Medication Sig Dispense Refill   • amLODIPine (NORVASC) 10 MG tablet TAKE 1 TABLET EVERY MORNINGFOR HIGH BLOOD PRESSURE 90 tablet 3   • Calcium Carbonate-Vitamin D (CALCIUM 500 + D) 500-125 MG-UNIT tablet Take  by mouth.     • fluticasone (FLONASE) 50 MCG/ACT nasal spray 2 sprays into each nostril daily. 1 each 2   • LORazepam (ATIVAN) 1 MG tablet Take 1 tablet by mouth Daily As Needed for Anxiety. 90 tablet 1   • Multiple Vitamins-Minerals (MULTIPLE VITAMINS/WOMENS PO) Take  by mouth.     • peppermint oil liquid 1 capsule As Needed.     • raNITIdine (ZANTAC) 300 MG  tablet Take 1 tablet by mouth Daily. 90 tablet 3   • zolpidem (AMBIEN) 10 MG tablet Take 1 tablet by mouth At Night As Needed for Sleep. For insomnia 90 tablet 1   • amLODIPine (NORVASC) 10 MG tablet Take 10 mg by mouth.     • Estriol powder Compounding pharmacy       No facility-administered medications prior to visit.      No orders of the defined types were placed in this encounter.    [unfilled]  Medications Discontinued During This Encounter   Medication Reason   • amLODIPine (NORVASC) 10 MG tablet Therapy completed   • Estriol powder Therapy completed         Return in about 1 week (around 10/2/2017), or if symptoms worsen or fail to improve, for Next scheduled follow up.

## 2017-11-07 ENCOUNTER — OFFICE VISIT (OUTPATIENT)
Dept: GASTROENTEROLOGY | Facility: CLINIC | Age: 82
End: 2017-11-07

## 2017-11-07 VITALS
HEART RATE: 82 BPM | DIASTOLIC BLOOD PRESSURE: 80 MMHG | BODY MASS INDEX: 22.36 KG/M2 | WEIGHT: 131 LBS | HEIGHT: 64 IN | SYSTOLIC BLOOD PRESSURE: 134 MMHG

## 2017-11-07 DIAGNOSIS — K21.9 GASTROESOPHAGEAL REFLUX DISEASE, ESOPHAGITIS PRESENCE NOT SPECIFIED: ICD-10-CM

## 2017-11-07 DIAGNOSIS — R19.7 DIARRHEA, UNSPECIFIED TYPE: Primary | ICD-10-CM

## 2017-11-07 DIAGNOSIS — Z83.79 FAMILY HISTORY OF DIGESTIVE DISORDER: ICD-10-CM

## 2017-11-07 PROBLEM — Z90.49 HISTORY OF RESECTION OF LARGE BOWEL: Status: ACTIVE | Noted: 2017-11-07

## 2017-11-07 PROCEDURE — 99204 OFFICE O/P NEW MOD 45 MIN: CPT | Performed by: INTERNAL MEDICINE

## 2017-11-07 RX ORDER — ESOMEPRAZOLE MAGNESIUM 40 MG/1
40 CAPSULE, DELAYED RELEASE ORAL DAILY
Qty: 30 CAPSULE | Refills: 11 | Status: SHIPPED | OUTPATIENT
Start: 2017-11-07 | End: 2018-05-30 | Stop reason: SDUPTHER

## 2017-11-07 RX ORDER — MELATONIN
1000 DAILY
COMMUNITY

## 2017-11-07 RX ORDER — PHENOL 1.4 %
600 AEROSOL, SPRAY (ML) MUCOUS MEMBRANE 2 TIMES DAILY WITH MEALS
COMMUNITY
End: 2020-09-08 | Stop reason: ALTCHOICE

## 2017-11-07 NOTE — PROGRESS NOTES
"Subjective   Brittny Fay is a 82 y.o. female is being seen for consultation today at the request of No ref. provider found  Chief Complaint   Patient presents with   • Diarrhea     Hosp FU   • Heartburn     History of Present Illness  Patient has a long history of both intermittent diarrhea and heartburn.  She had undergone sigmoid resection and left nephrectomy in 2008 for diverticulitis and abscess formation.  Dr. Lancaster perform this operation and is been seen her periodically ever since for both endoscopy and colonoscopy.  Her last examinations were 2014.  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  She is had a few small polyps pop up along the way in the colon, nothing major.  She was hospitalized in the [facility] in September for \"acute enteritis\".  At that time she had what appeared to be some inflammation in the small intestine and some mild narrowing.  Patient has a long history of intermittent abdominal bloating and diarrhea along with the heartburn.  She has fairly frequent breakthrough symptoms on Zantac.  She reports no dysphagia.  She has recently started probiotic therapy and fiber supplements and she says that helps her lower tract symptoms.  The following portions of the patient's history were reviewed and updated as appropriate: allergies, current medications, past family history, past medical history, past social history, past surgical history and problem list.    Review of Systems   Constitutional: Negative for appetite change, diaphoresis, fatigue, fever and unexpected weight change.   HENT: Negative for hearing loss, mouth sores, sore throat and trouble swallowing.    Eyes: Negative for pain and redness.   Respiratory: Negative for choking and shortness of breath.    Cardiovascular: Negative for chest pain and leg swelling.   Gastrointestinal: Positive for abdominal distention and diarrhea. Negative for " abdominal pain, anal bleeding, blood in stool, constipation, nausea, rectal pain and vomiting.   Genitourinary: Negative for flank pain and hematuria.   Musculoskeletal: Negative for arthralgias and joint swelling.   Skin: Negative for color change and rash.   Allergic/Immunologic: Negative for food allergies and immunocompromised state.   Neurological: Negative for dizziness, seizures and headaches.   Hematological: Does not bruise/bleed easily.   Psychiatric/Behavioral: Negative for confusion, sleep disturbance and suicidal ideas. The patient is not nervous/anxious.        Objective   Physical Exam   Constitutional: She is oriented to person, place, and time. She appears well-developed and well-nourished.   HENT:   Head: Normocephalic and atraumatic.   Right Ear: External ear normal.   Left Ear: External ear normal.   Nose: Nose normal.   Eyes: Conjunctivae are normal. Pupils are equal, round, and reactive to light.   Neck: Neck supple. No thyromegaly present.   Cardiovascular: Normal heart sounds.  Exam reveals no gallop and no friction rub.    No murmur heard.  Pulmonary/Chest: Effort normal and breath sounds normal.   Abdominal: Soft. Bowel sounds are normal. She exhibits no distension and no mass. There is no tenderness.   Musculoskeletal: She exhibits no edema.   Neurological: She is alert and oriented to person, place, and time.   Skin: No rash noted. No erythema.   Psychiatric: She has a normal mood and affect. Her behavior is normal.   Nursing note and vitals reviewed.        Assessment/Plan   Problems Addressed this Visit        Digestive    Gastroesophageal reflux disease    Relevant Medications    esomeprazole (nexIUM) 40 MG capsule    Other Relevant Orders    Celiac Comprehensive Panel    FL small bowel follow through    Diarrhea - Primary    Relevant Orders    Celiac Comprehensive Panel    FL small bowel follow through       Other    Family history of digestive disorder    Relevant Orders    Celiac  Comprehensive Panel    FL small bowel follow through        I would have her try Nexium for now and I think this will give her better control of her reflux symptoms.  Her diarrhea is probably multiply and factorial in etiology.  I would like to get a small bowel follow-through to follow up on the abnormalities noted on prior CT scan.  I would also like to get a celiac panel.  She mentions that her daughter carries the diagnosis of celiac disease.  We will see her back shortly.  FODMAPS.  Continuation of probiotic therapy encouraged.

## 2017-11-10 ENCOUNTER — APPOINTMENT (OUTPATIENT)
Dept: LAB | Facility: HOSPITAL | Age: 82
End: 2017-11-10

## 2017-11-10 PROCEDURE — 83516 IMMUNOASSAY NONANTIBODY: CPT | Performed by: INTERNAL MEDICINE

## 2017-11-10 PROCEDURE — 36415 COLL VENOUS BLD VENIPUNCTURE: CPT | Performed by: INTERNAL MEDICINE

## 2017-11-13 LAB
ENDOMYSIUM IGA SER QL: NEGATIVE
GLIADIN PEPTIDE IGA SER-ACNC: 5 UNITS (ref 0–19)
GLIADIN PEPTIDE IGG SER-ACNC: 1 UNITS (ref 0–19)
IGA SERPL-MCNC: 264 MG/DL (ref 64–422)
TTG IGA SER-ACNC: <2 U/ML (ref 0–3)
TTG IGG SER-ACNC: <2 U/ML (ref 0–5)

## 2017-11-14 ENCOUNTER — HOSPITAL ENCOUNTER (OUTPATIENT)
Dept: GENERAL RADIOLOGY | Facility: HOSPITAL | Age: 82
Discharge: HOME | End: 2017-11-14
Attending: INTERNAL MEDICINE | Admitting: INTERNAL MEDICINE

## 2017-11-14 ENCOUNTER — TELEPHONE (OUTPATIENT)
Dept: GASTROENTEROLOGY | Facility: CLINIC | Age: 82
End: 2017-11-14

## 2017-11-14 DIAGNOSIS — R19.7 DIARRHEA, UNSPECIFIED TYPE: ICD-10-CM

## 2017-11-14 DIAGNOSIS — Z83.79 FAMILY HISTORY OF DIGESTIVE DISORDER: ICD-10-CM

## 2017-11-14 DIAGNOSIS — K21.9 GASTROESOPHAGEAL REFLUX DISEASE, ESOPHAGITIS PRESENCE NOT SPECIFIED: ICD-10-CM

## 2017-11-14 PROCEDURE — 74250 X-RAY XM SM INT 1CNTRST STD: CPT

## 2017-11-14 NOTE — TELEPHONE ENCOUNTER
----- Message from Josiah Weber MD sent at 11/13/2017  3:51 PM EST -----  Advise patient the results were normal.

## 2017-11-16 ENCOUNTER — TELEPHONE (OUTPATIENT)
Dept: GASTROENTEROLOGY | Facility: CLINIC | Age: 82
End: 2017-11-16

## 2017-11-16 NOTE — TELEPHONE ENCOUNTER
----- Message from Josiah Weber MD sent at 11/15/2017  4:13 PM EST -----  Advise patient the results were normal.

## 2017-12-05 ENCOUNTER — OFFICE VISIT (OUTPATIENT)
Dept: GASTROENTEROLOGY | Facility: CLINIC | Age: 82
End: 2017-12-05

## 2017-12-05 VITALS — HEIGHT: 64 IN | BODY MASS INDEX: 22.53 KG/M2 | WEIGHT: 132 LBS

## 2017-12-05 DIAGNOSIS — M62.89 PELVIC FLOOR DYSFUNCTION: ICD-10-CM

## 2017-12-05 DIAGNOSIS — K58.0 IRRITABLE BOWEL SYNDROME WITH DIARRHEA: Primary | ICD-10-CM

## 2017-12-05 DIAGNOSIS — K21.9 GASTROESOPHAGEAL REFLUX DISEASE WITHOUT ESOPHAGITIS: ICD-10-CM

## 2017-12-05 PROCEDURE — 99213 OFFICE O/P EST LOW 20 MIN: CPT | Performed by: INTERNAL MEDICINE

## 2017-12-05 NOTE — PROGRESS NOTES
Subjective   Brittny Fay is a 82 y.o. female is here today for follow-up.  Chief Complaint   Patient presents with   • Diarrhea   • Abdominal Pain   • Heartburn     History of Present Illness  Very pleasant lady who is a long-term patient of Dr. Darren Weiss.  She is had problems with heartburn and diarrhea.  At the time of her last visit she was prescribed FODMAPS diet, probiotic therapy, and PPI.  She is doing better but has bouts of fecal urgency that verge on fecal soilage.  Interestingly, she had undergone surgery just a few years ago for both what sounds like cystocele and rectocele.  She says that her colon function didn't appear to improve after that.  The following portions of the patient's history were reviewed and updated as appropriate: allergies, current medications, past family history, past medical history, past social history, past surgical history and problem list.    Review of Systems   Respiratory: Negative for shortness of breath.    Cardiovascular: Negative for chest pain.   All other systems reviewed and are negative.      Objective   Physical Exam   Constitutional: She appears well-developed and well-nourished.   Nursing note and vitals reviewed.        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  I do think she suffers from pelvic floor dysfunction and she might benefit from pelvic health evaluation so we will go ahead and set that up for her and see her back after their evaluation.

## 2017-12-22 ENCOUNTER — TELEPHONE (OUTPATIENT)
Dept: INTERNAL MEDICINE | Facility: CLINIC | Age: 82
End: 2017-12-22

## 2017-12-22 NOTE — TELEPHONE ENCOUNTER
Patient called office RX line and LVM for a refill of RX LORazepam 1 mg. Per chart patient should have 1 refill left. I spoke to patient and advised to contact her mail order pharmacy to double check refill is on file. Patient voiced understanding.

## 2018-02-05 LAB
ALBUMIN SERPL-MCNC: 4.7 G/DL (ref 3.5–5.2)
ALBUMIN/GLOB SERPL: 1.8 G/DL
ALP SERPL-CCNC: 83 U/L (ref 40–129)
ALT SERPL-CCNC: 12 U/L (ref 5–33)
AST SERPL-CCNC: 16 U/L (ref 5–32)
BASOPHILS # BLD AUTO: 0.06 10*3/MM3 (ref 0–0.2)
BASOPHILS NFR BLD AUTO: 1.1 % (ref 0–2)
BILIRUB SERPL-MCNC: 0.5 MG/DL (ref 0.2–1.2)
BUN SERPL-MCNC: 10 MG/DL (ref 8–23)
BUN/CREAT SERPL: 14.5 (ref 7–25)
CALCIUM SERPL-MCNC: 9 MG/DL (ref 8.8–10.5)
CHLORIDE SERPL-SCNC: 104 MMOL/L (ref 98–107)
CHOLEST SERPL-MCNC: 169 MG/DL (ref 0–200)
CHOLEST/HDLC SERPL: 2.52 {RATIO}
CO2 SERPL-SCNC: 29.9 MMOL/L (ref 22–29)
CREAT SERPL-MCNC: 0.69 MG/DL (ref 0.57–1)
EOSINOPHIL # BLD AUTO: 0.18 10*3/MM3 (ref 0.1–0.3)
EOSINOPHIL NFR BLD AUTO: 3.3 % (ref 0–4)
ERYTHROCYTE [DISTWIDTH] IN BLOOD BY AUTOMATED COUNT: 13.9 % (ref 11.5–14.5)
GFR SERPLBLD CREATININE-BSD FMLA CKD-EPI: 82 ML/MIN/1.73
GFR SERPLBLD CREATININE-BSD FMLA CKD-EPI: 99 ML/MIN/1.73
GLOBULIN SER CALC-MCNC: 2.6 GM/DL
GLUCOSE SERPL-MCNC: 94 MG/DL (ref 65–99)
HBA1C MFR BLD: 5.6 % (ref 4.8–5.6)
HCT VFR BLD AUTO: 43.8 % (ref 37–47)
HDLC SERPL-MCNC: 67 MG/DL (ref 40–60)
HGB BLD-MCNC: 14.1 G/DL (ref 12–16)
IMM GRANULOCYTES # BLD: 0.01 10*3/MM3 (ref 0–0.03)
IMM GRANULOCYTES NFR BLD: 0.2 % (ref 0–0.5)
LDLC SERPL CALC-MCNC: 89 MG/DL (ref 0–100)
LYMPHOCYTES # BLD AUTO: 1.99 10*3/MM3 (ref 0.6–4.8)
LYMPHOCYTES NFR BLD AUTO: 36.9 % (ref 20–45)
MCH RBC QN AUTO: 28.6 PG (ref 27–31)
MCHC RBC AUTO-ENTMCNC: 32.2 G/DL (ref 31–37)
MCV RBC AUTO: 88.8 FL (ref 81–99)
MONOCYTES # BLD AUTO: 0.54 10*3/MM3 (ref 0–1)
MONOCYTES NFR BLD AUTO: 10 % (ref 3–8)
NEUTROPHILS # BLD AUTO: 2.62 10*3/MM3 (ref 1.5–8.3)
NEUTROPHILS NFR BLD AUTO: 48.5 % (ref 45–70)
NRBC BLD AUTO-RTO: 0 /100 WBC (ref 0–0)
PLATELET # BLD AUTO: 300 10*3/MM3 (ref 140–500)
POTASSIUM SERPL-SCNC: 4.7 MMOL/L (ref 3.5–5.2)
PROT SERPL-MCNC: 7.3 G/DL (ref 6–8.5)
RBC # BLD AUTO: 4.93 10*6/MM3 (ref 4.2–5.4)
SODIUM SERPL-SCNC: 146 MMOL/L (ref 136–145)
TRIGL SERPL-MCNC: 66 MG/DL (ref 0–150)
TSH SERPL DL<=0.005 MIU/L-ACNC: 0.81 MIU/ML (ref 0.27–4.2)
VIT B12 SERPL-MCNC: 933 PG/ML (ref 232–1245)
VLDLC SERPL CALC-MCNC: 13.2 MG/DL (ref 7–27)
WBC # BLD AUTO: 5.4 10*3/MM3 (ref 4.8–10.8)

## 2018-02-12 ENCOUNTER — OFFICE VISIT (OUTPATIENT)
Dept: INTERNAL MEDICINE | Facility: CLINIC | Age: 83
End: 2018-02-12

## 2018-02-12 VITALS
DIASTOLIC BLOOD PRESSURE: 60 MMHG | BODY MASS INDEX: 23.72 KG/M2 | TEMPERATURE: 98 F | HEART RATE: 77 BPM | WEIGHT: 138.2 LBS | OXYGEN SATURATION: 98 % | SYSTOLIC BLOOD PRESSURE: 120 MMHG

## 2018-02-12 DIAGNOSIS — G47.00 INSOMNIA, UNSPECIFIED TYPE: ICD-10-CM

## 2018-02-12 DIAGNOSIS — M85.88 OSTEOPENIA OF SPINE: ICD-10-CM

## 2018-02-12 DIAGNOSIS — J30.2 CHRONIC SEASONAL ALLERGIC RHINITIS, UNSPECIFIED TRIGGER: ICD-10-CM

## 2018-02-12 DIAGNOSIS — R73.01 ELEVATED FASTING GLUCOSE: ICD-10-CM

## 2018-02-12 DIAGNOSIS — K21.9 GASTROESOPHAGEAL REFLUX DISEASE WITHOUT ESOPHAGITIS: ICD-10-CM

## 2018-02-12 DIAGNOSIS — N32.81 OVERACTIVE BLADDER: ICD-10-CM

## 2018-02-12 DIAGNOSIS — I10 BENIGN ESSENTIAL HYPERTENSION: Primary | ICD-10-CM

## 2018-02-12 DIAGNOSIS — N28.1 RENAL CYST, LEFT: ICD-10-CM

## 2018-02-12 DIAGNOSIS — G47.09 OTHER INSOMNIA: ICD-10-CM

## 2018-02-12 PROCEDURE — 99214 OFFICE O/P EST MOD 30 MIN: CPT | Performed by: FAMILY MEDICINE

## 2018-02-12 RX ORDER — ZOLPIDEM TARTRATE 10 MG/1
10 TABLET ORAL NIGHTLY PRN
Qty: 90 TABLET | Refills: 1 | Status: SHIPPED | OUTPATIENT
Start: 2018-02-12 | End: 2018-02-12 | Stop reason: SDUPTHER

## 2018-02-12 RX ORDER — ZOLPIDEM TARTRATE 10 MG/1
10 TABLET ORAL NIGHTLY PRN
Qty: 90 TABLET | Refills: 1 | Status: SHIPPED | OUTPATIENT
Start: 2018-02-12 | End: 2018-02-22 | Stop reason: SDUPTHER

## 2018-02-12 NOTE — PROGRESS NOTES
Subjective     Brittny Fay is a 83 y.o. female, who presents with a chief complaint of   Chief Complaint   Patient presents with   • Hypertension       Hypertension     Heartburn     1. HTN. Still tolerating amlodipine.  Denies chest pain, dizziness.    2. GERD.  Pt reports symptoms are controlled with esomeprazole.    3. Insomnia.  She is still requiring medication to obtain adequate sleep.    The following portions of the patient's history were reviewed and updated as appropriate: allergies, current medications, past family history, past medical history, past social history, past surgical history and problem list.    Allergies: Amitriptyline; Bupropion; and Pneumococcal vaccines    Review of Systems   Constitutional: Negative.    HENT: Negative.    Eyes: Negative.    Respiratory: Negative.    Cardiovascular: Negative.    Gastrointestinal: Negative.    Endocrine: Negative.    Genitourinary: Negative.    Musculoskeletal: Negative.    Skin: Negative.    Allergic/Immunologic: Positive for environmental allergies.   Neurological: Negative.    Psychiatric/Behavioral: Positive for sleep disturbance.       Objective     Wt Readings from Last 3 Encounters:   02/12/18 62.7 kg (138 lb 3.2 oz)   12/05/17 59.9 kg (132 lb)   11/07/17 59.4 kg (131 lb)     Temp Readings from Last 3 Encounters:   02/12/18 98 °F (36.7 °C)   09/25/17 98.3 °F (36.8 °C)   08/09/17 97.7 °F (36.5 °C)     BP Readings from Last 3 Encounters:   02/12/18 120/60   11/07/17 134/80   09/25/17 120/70     Pulse Readings from Last 3 Encounters:   02/12/18 77   11/07/17 82   09/25/17 89     Body mass index is 23.72 kg/(m^2).  SpO2 Readings from Last 3 Encounters:   02/12/18 98%   09/25/17 97%   08/09/17 98%       Physical Exam   Constitutional: She is oriented to person, place, and time. She appears well-developed and well-nourished.   HENT:   Head: Normocephalic.   Mouth/Throat: Oropharynx is clear and moist.   Eyes: Conjunctivae and EOM are normal. Pupils  are equal, round, and reactive to light.   Neck: Normal range of motion. Neck supple. No thyromegaly present.   Cardiovascular: Normal rate, regular rhythm and normal heart sounds.    Pulmonary/Chest: Effort normal and breath sounds normal.   Abdominal: Soft. Bowel sounds are normal. There is no hepatosplenomegaly.   Musculoskeletal: Normal range of motion. She exhibits no edema.   Lymphadenopathy:     She has no cervical adenopathy.   Neurological: She is alert and oriented to person, place, and time.   Skin: Skin is warm and dry. No rash noted.   Psychiatric: She has a normal mood and affect. Her behavior is normal.   Vitals reviewed.      Results for orders placed or performed in visit on 11/07/17   Celiac Comprehensive Panel   Result Value Ref Range    Gliadin Deamidated Peptide Ab, IgA 5 0 - 19 units    Deaminated Gliadin Ab IgG 1 0 - 19 units    Tissue Transglutaminase IgA <2 0 - 3 U/mL    Tissue Transglutaminase IgG <2 0 - 5 U/mL    Endomysial IgA Negative Negative    IgA 264 64 - 422 mg/dL       Assessment/Plan   Brittny was seen today for heartburn and hypertension.    Diagnoses and all orders for this visit:    Benign essential hypertension  -     Comprehensive Metabolic Panel; Future  -     Lipid Panel With / Chol / HDL Ratio; Future  -     TSH; Future    Seasonal allergic rhinitis, unspecified allergic rhinitis trigger    Gastroesophageal reflux disease without esophagitis  -     CBC & Differential; Future    Overactive bladder    Osteopenia    Insomnia, unspecified type    Elevated fasting glucose  -     Hemoglobin A1c; Future  -     Vitamin B12; Future    Renal cyst, left    1. HTN.  Continue amlodipine.  Labs reviewed.     2. TWIN.  Controlled with nasal steroid.    3. GERD.  Controlled with PPI.    4. OAB/bladder prolapse.  Resolved after surgical repair.    5. Osteopenia.  Continue calcium and vitamin D, weight bearing exercise.  Next dexa scan 2/2019.    6. Insomnia.  Alternates lorazepam and zolpidem  to prevent tolerance.  Med management agreement and Carlos UTD.    7. Elevated fasting glucose.  Improved.  A1c 5.5.  Lifestyle measures.    8. Left renal cyst.  Previously reported as complex.  Read as simple this last time on u/s.  We will plan to repeat this in May of 2018.    9. Routine health maint.  Offered annual wellness exam and patient declined.  UTD on vaccines.  Mammogram done 8/2017.  `  Outpatient Medications Prior to Visit   Medication Sig Dispense Refill   • esomeprazole (nexIUM) 40 MG capsule Take 1 capsule by mouth Daily. 30 capsule 11   • Estriol 10 % cream      • fluticasone (FLONASE) 50 MCG/ACT nasal spray 2 sprays into each nostril daily. 1 each 2   • LORazepam (ATIVAN) 1 MG tablet Take 1 tablet by mouth Daily As Needed for Anxiety. 90 tablet 1   • Multiple Vitamins-Minerals (MULTIPLE VITAMINS/WOMENS PO) Take  by mouth.     • Probiotic Product (PROBIOTIC PO) Take  by mouth.     • Wheat Dextrin (BENEFIBER PO) Take  by mouth.     • peppermint oil liquid 1 capsule As Needed.     • zolpidem (AMBIEN) 10 MG tablet Take 1 tablet by mouth At Night As Needed for Sleep. For insomnia 90 tablet 1   • amLODIPine (NORVASC) 10 MG tablet TAKE 1 TABLET EVERY MORNINGFOR HIGH BLOOD PRESSURE 90 tablet 3   • ascorbic acid (VITAMIN C) 100 MG tablet Take 100 mg by mouth.     • calcium carbonate (OS-JOSIANE) 600 MG tablet Take 600 mg by mouth 2 (Two) Times a Day With Meals.     • cholecalciferol (VITAMIN D3) 1000 units tablet Take 1,000 Units by mouth Daily.     • diphenoxylate-atropine (LOMOTIL) 2.5-0.025 MG per tablet Take 1 tablet by mouth.       No facility-administered medications prior to visit.      New Medications Ordered This Visit   Medications   • zolpidem (AMBIEN) 10 MG tablet     Sig: Take 1 tablet by mouth At Night As Needed for Sleep. For insomnia     Dispense:  90 tablet     Refill:  1     [unfilled]  Medications Discontinued During This Encounter   Medication Reason   • diphenoxylate-atropine  (LOMOTIL) 2.5-0.025 MG per tablet *Therapy completed   • peppermint oil liquid *Therapy completed   • zolpidem (AMBIEN) 10 MG tablet Reorder         Return in about 6 months (around 8/12/2018).

## 2018-02-15 ENCOUNTER — TELEPHONE (OUTPATIENT)
Dept: INTERNAL MEDICINE | Facility: CLINIC | Age: 83
End: 2018-02-15

## 2018-02-15 DIAGNOSIS — Z83.2 FAMILY HISTORY OF VON WILLEBRAND DISEASE: Primary | ICD-10-CM

## 2018-02-15 NOTE — TELEPHONE ENCOUNTER
Pt called and said there are several cases of Vons WilleBarndes in the family and they want to see if she is a carrier.sally

## 2018-02-20 ENCOUNTER — RESULTS ENCOUNTER (OUTPATIENT)
Dept: INTERNAL MEDICINE | Facility: CLINIC | Age: 83
End: 2018-02-20

## 2018-02-20 DIAGNOSIS — Z83.2 FAMILY HISTORY OF VON WILLEBRAND DISEASE: ICD-10-CM

## 2018-02-22 ENCOUNTER — TELEPHONE (OUTPATIENT)
Dept: INTERNAL MEDICINE | Facility: CLINIC | Age: 83
End: 2018-02-22

## 2018-02-22 DIAGNOSIS — G47.00 INSOMNIA, UNSPECIFIED TYPE: ICD-10-CM

## 2018-02-22 RX ORDER — ZOLPIDEM TARTRATE 10 MG/1
10 TABLET ORAL NIGHTLY PRN
Qty: 90 TABLET | Refills: 1 | OUTPATIENT
Start: 2018-02-22 | End: 2018-10-22

## 2018-02-22 NOTE — TELEPHONE ENCOUNTER
----- Message from Gladis Garner MA sent at 2/19/2018 11:47 AM EST -----  ambien  Needs  Us to call  [facility]   [phone]  [phone]           Called  And spoke w/gabi the pharmacy at [facility]  Called in  Ambien, per pt this was suppose to have already been taken care of by dr michelle Saez,  There was not recordd of medicaiton being sent.   This will possibly need pa  Insurance will let me know if needs pa

## 2018-02-26 ENCOUNTER — TELEPHONE (OUTPATIENT)
Dept: GASTROENTEROLOGY | Facility: CLINIC | Age: 83
End: 2018-02-26

## 2018-02-26 NOTE — TELEPHONE ENCOUNTER
Patient called to schedule EGD. UPDATED HEALTH HISTORY. WILL ALSO SEE ABOUT SETTING PT UP FOR PELVIC HEALTH AGAIN

## 2018-03-01 ENCOUNTER — TELEPHONE (OUTPATIENT)
Dept: GASTROENTEROLOGY | Facility: CLINIC | Age: 83
End: 2018-03-01

## 2018-03-01 NOTE — TELEPHONE ENCOUNTER
Let patient know I have re faxed  inforrmation to [facility].they only have 1 location where they do pelvic health. She is fine with that.

## 2018-03-02 ENCOUNTER — PREP FOR SURGERY (OUTPATIENT)
Dept: OTHER | Facility: HOSPITAL | Age: 83
End: 2018-03-02

## 2018-03-02 DIAGNOSIS — K21.9 GASTROESOPHAGEAL REFLUX DISEASE, ESOPHAGITIS PRESENCE NOT SPECIFIED: Primary | ICD-10-CM

## 2018-03-02 RX ORDER — SODIUM CHLORIDE, SODIUM LACTATE, POTASSIUM CHLORIDE, CALCIUM CHLORIDE 600; 310; 30; 20 MG/100ML; MG/100ML; MG/100ML; MG/100ML
30 INJECTION, SOLUTION INTRAVENOUS CONTINUOUS
Status: CANCELLED | OUTPATIENT
Start: 2018-04-02

## 2018-04-02 ENCOUNTER — HOSPITAL ENCOUNTER (OUTPATIENT)
Facility: HOSPITAL | Age: 83
Setting detail: HOSPITAL OUTPATIENT SURGERY
Discharge: HOME | End: 2018-04-02
Attending: INTERNAL MEDICINE | Admitting: INTERNAL MEDICINE

## 2018-04-02 ENCOUNTER — ANESTHESIA (OUTPATIENT)
Dept: GASTROENTEROLOGY | Facility: HOSPITAL | Age: 83
End: 2018-04-02

## 2018-04-02 ENCOUNTER — ANESTHESIA EVENT (OUTPATIENT)
Dept: GASTROENTEROLOGY | Facility: HOSPITAL | Age: 83
End: 2018-04-02

## 2018-04-02 VITALS
DIASTOLIC BLOOD PRESSURE: 71 MMHG | BODY MASS INDEX: 23.45 KG/M2 | HEIGHT: 64 IN | HEART RATE: 71 BPM | WEIGHT: 137.38 LBS | TEMPERATURE: 98 F | SYSTOLIC BLOOD PRESSURE: 119 MMHG | RESPIRATION RATE: 14 BRPM | OXYGEN SATURATION: 97 %

## 2018-04-02 DIAGNOSIS — K21.9 GASTROESOPHAGEAL REFLUX DISEASE, ESOPHAGITIS PRESENCE NOT SPECIFIED: ICD-10-CM

## 2018-04-02 PROCEDURE — 25010000002 PROPOFOL 10 MG/ML EMULSION: Performed by: NURSE ANESTHETIST, CERTIFIED REGISTERED

## 2018-04-02 PROCEDURE — 43239 EGD BIOPSY SINGLE/MULTIPLE: CPT | Performed by: INTERNAL MEDICINE

## 2018-04-02 PROCEDURE — 88305 TISSUE EXAM BY PATHOLOGIST: CPT | Performed by: INTERNAL MEDICINE

## 2018-04-02 RX ORDER — PROPOFOL 10 MG/ML
VIAL (ML) INTRAVENOUS AS NEEDED
Status: DISCONTINUED | OUTPATIENT
Start: 2018-04-02 | End: 2018-04-02 | Stop reason: SURG

## 2018-04-02 RX ORDER — SODIUM CHLORIDE, SODIUM LACTATE, POTASSIUM CHLORIDE, CALCIUM CHLORIDE 600; 310; 30; 20 MG/100ML; MG/100ML; MG/100ML; MG/100ML
30 INJECTION, SOLUTION INTRAVENOUS CONTINUOUS
Status: DISCONTINUED | OUTPATIENT
Start: 2018-04-02 | End: 2018-04-02 | Stop reason: HOSPADM

## 2018-04-02 RX ORDER — PROPOFOL 10 MG/ML
VIAL (ML) INTRAVENOUS CONTINUOUS PRN
Status: DISCONTINUED | OUTPATIENT
Start: 2018-04-02 | End: 2018-04-02 | Stop reason: SURG

## 2018-04-02 RX ORDER — LIDOCAINE HYDROCHLORIDE 20 MG/ML
INJECTION, SOLUTION INFILTRATION; PERINEURAL AS NEEDED
Status: DISCONTINUED | OUTPATIENT
Start: 2018-04-02 | End: 2018-04-02 | Stop reason: SURG

## 2018-04-02 RX ADMIN — PROPOFOL 300 MCG/KG/MIN: 10 INJECTION, EMULSION INTRAVENOUS at 08:14

## 2018-04-02 RX ADMIN — PROPOFOL 80 MG: 10 INJECTION, EMULSION INTRAVENOUS at 08:14

## 2018-04-02 RX ADMIN — LIDOCAINE HYDROCHLORIDE 80 MG: 20 INJECTION, SOLUTION INFILTRATION; PERINEURAL at 08:14

## 2018-04-02 RX ADMIN — SODIUM CHLORIDE, POTASSIUM CHLORIDE, SODIUM LACTATE AND CALCIUM CHLORIDE 30 ML/HR: 600; 310; 30; 20 INJECTION, SOLUTION INTRAVENOUS at 07:47

## 2018-04-02 NOTE — ANESTHESIA POSTPROCEDURE EVALUATION
"Patient: Brittny Fay    Procedure Summary     Date:  04/02/18 Room / Location:   [facility]    Anesthesia Start:  0758 Anesthesia Stop:  0828    Procedure:  ESOPHAGOGASTRODUODENOSCOPY WITH COLD BIOPSIES (N/A Esophagus) Diagnosis:       Gastroesophageal reflux disease, esophagitis presence not specified      (Gastroesophageal reflux disease, esophagitis presence not specified [K21.9])    Surgeon:  Josiah Weber MD Provider:  Yvette Villalpando MD    Anesthesia Type:  MAC ASA Status:  2          Anesthesia Type: MAC  Last vitals  BP   119/71 (04/02/18 0846)   Temp   36.7 °C (98 °F) (04/02/18 0827)   Pulse   71 (04/02/18 0846)   Resp   14 (04/02/18 0846)     SpO2   97 % (04/02/18 0846)     Post Anesthesia Care and Evaluation    Patient location during evaluation: PACU    Comments: Blood pressure 119/71, pulse 71, temperature 36.7 °C (98 °F), temperature source Oral, resp. rate 14, height 162.6 cm (64\"), weight 62.3 kg (137 lb 6 oz), SpO2 97 %.    No anesthesia care post op    "

## 2018-04-02 NOTE — ANESTHESIA PREPROCEDURE EVALUATION
Anesthesia Evaluation     Patient summary reviewed and Nursing notes reviewed                Airway   Mallampati: I  TM distance: >3 FB  Neck ROM: full  No difficulty expected  Dental      Pulmonary - normal exam   (+) sleep apnea (mild ),   Cardiovascular - normal exam    (+) hypertension well controlled,       Neuro/Psych  (+) psychiatric history Depression,       ROS Comment: insomnia  GI/Hepatic/Renal/Endo      Musculoskeletal     Abdominal  - normal exam   Substance History      OB/GYN          Other                        Anesthesia Plan    ASA 2     MAC     Anesthetic plan and risks discussed with patient.

## 2018-04-02 NOTE — H&P
CC: Here for endoscopic evaluation.     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   • Bladder prolapse, female, acquired    • Chest pain    • Chronic interstitial cystitis    • Degeneration of cervical intervertebral disc    • Depression    • Diverticulitis 2008   • Diverticulosis of colon    • Hiatal hernia    • Hypertension    • IBS (irritable bowel syndrome)    • Insomnia    • Obstructive sleep apnea    • Osteoarthritis    • Reflux esophagitis    • Solitary thyroid nodule        Past Surgical History:   Procedure Laterality Date   • CHOLECYSTECTOMY     • COLONOSCOPY  04/29/2014    Darren Lancaster MD   • EYE SURGERY      Cataract surgery bilateral   • OOPHORECTOMY Right    • OTHER SURGICAL HISTORY      Laparoscopy Partial Colectomy Sigmoid   • PROCTOPEXY W/ SIGMOID RESECTION     • UMBILICAL HERNIA REPAIR  2015   • UPPER GASTROINTESTINAL ENDOSCOPY  04/29/2014    Darren Lancaster MD   • VAGINAL PROLAPSE REPAIR      ALL PELVIC ORGAN PROLAPSE SURGERY       Prior to Admission medications    Medication Sig Start Date End Date Taking? Authorizing Provider   amLODIPine (NORVASC) 10 MG tablet TAKE 1 TABLET EVERY MORNINGFOR HIGH BLOOD PRESSURE 8/28/17  Yes Remedios Sorto MD   ascorbic acid (VITAMIN C) 100 MG tablet Take 100 mg by mouth.   Yes Historical Provider, MD   calcium carbonate (OS-JOSIANE) 600 MG tablet Take 600 mg by mouth 2 (Two) Times a Day With Meals.   Yes Historical Provider, MD   cholecalciferol (VITAMIN D3) 1000 units tablet Take 1,000 Units by mouth Daily.   Yes Historical Provider, MD   esomeprazole (nexIUM) 40 MG capsule Take 1 capsule by mouth Daily. 11/7/17  Yes Josiah Weber MD   Multiple Vitamins-Minerals (MULTIPLE VITAMINS/WOMENS PO) Take  by mouth.   Yes Historical Provider, MD   Probiotic Product (PROBIOTIC PO) Take  by mouth.   Yes Historical Provider, MD   Wheat Dextrin (BENEFIBER PO) Take  by mouth.    Yes Historical Provider, MD   zolpidem (AMBIEN) 10 MG tablet Take 1 tablet by mouth At Night As Needed for Sleep. For insomnia 2/22/18  Yes Remedios Sorto MD   Estriol 10 % cream     Historical Provider, MD   fluticasone (FLONASE) 50 MCG/ACT nasal spray 2 sprays into each nostril daily. 4/7/16   Beena Vernon MD   LORazepam (ATIVAN) 1 MG tablet Take 1 tablet by mouth Daily As Needed for Anxiety. 8/9/17   Remedios Sorto MD       Allergies   Allergen Reactions   • Amitriptyline      Other reaction(s): alertness   • Bupropion      Other reaction(s): insomnia   • Pneumococcal Vaccines      Other reaction(s): Dizziness       Family History   Problem Relation Age of Onset   • Cancer Mother      Breast cancer   • Hypertension Father    • Stroke Father    • Hypertension Brother    • Cancer Maternal Grandmother      breast       OBJECTIVE:    Patient's vital signs reviewed. No acute distress.     Chest is clear, no wheezing or rales. Normal symmetric air entry throughout both lung fields. No chest wall deformities or tenderness.    S1 and S2 normal, no murmurs, clicks, gallops or rubs. Regular rate and rhythm. Chest is clear; no wheezes or rales. No edema or JVD.    The abdomen is soft without tenderness, guarding, mass, rebound or organomegaly. Bowel sounds are normal. No CVA tenderness or inguinal adenopathy noted.    Patient is alert, oriented and with an intact memory.    Assessment: GERD    Plan: EGD

## 2018-04-03 LAB
CYTO UR: NORMAL
LAB AP CASE REPORT: NORMAL
Lab: NORMAL
PATH REPORT.FINAL DX SPEC: NORMAL
PATH REPORT.GROSS SPEC: NORMAL

## 2018-04-05 DIAGNOSIS — F41.1 GENERALIZED ANXIETY DISORDER: ICD-10-CM

## 2018-04-05 RX ORDER — LORAZEPAM 1 MG/1
1 TABLET ORAL DAILY PRN
Qty: 90 TABLET | Refills: 1 | Status: SHIPPED | OUTPATIENT
Start: 2018-04-05 | End: 2018-07-12 | Stop reason: SDUPTHER

## 2018-04-06 ENCOUNTER — TELEPHONE (OUTPATIENT)
Dept: INTERNAL MEDICINE | Facility: CLINIC | Age: 83
End: 2018-04-06

## 2018-04-06 NOTE — TELEPHONE ENCOUNTER
Faxed to [facility]  Mail order    ----- Message from Gladis Garner MA sent at 4/5/2018  3:54 PM EDT -----  PT  NEEDS   REFILL GUNNAR SHERMAN  SENT TO MAIL ORDER [facility]   [phone]

## 2018-04-10 ENCOUNTER — TELEPHONE (OUTPATIENT)
Dept: GASTROENTEROLOGY | Facility: CLINIC | Age: 83
End: 2018-04-10

## 2018-04-10 NOTE — TELEPHONE ENCOUNTER
Pt has question about terminology on EGD pathology.  Discussed with Dr Weber, he states results are indicative of GERD and Abreu's.      Pt notified of Dr Weber's response.  She is concerned about Abreu's diagnosis and would like office appt to discuss further.  Helena HOLLEY appt.

## 2018-04-24 ENCOUNTER — OFFICE VISIT (OUTPATIENT)
Dept: GASTROENTEROLOGY | Facility: CLINIC | Age: 83
End: 2018-04-24

## 2018-04-24 VITALS — WEIGHT: 138 LBS | BODY MASS INDEX: 23.56 KG/M2 | HEIGHT: 64 IN

## 2018-04-24 DIAGNOSIS — K31.A0 INTESTINAL METAPLASIA OF GASTRIC CARDIA: Primary | ICD-10-CM

## 2018-04-24 DIAGNOSIS — K21.00 GASTROESOPHAGEAL REFLUX DISEASE WITH ESOPHAGITIS: ICD-10-CM

## 2018-04-24 PROCEDURE — 99213 OFFICE O/P EST LOW 20 MIN: CPT | Performed by: INTERNAL MEDICINE

## 2018-04-24 NOTE — PATIENT INSTRUCTIONS
Controversies regarding PPI therapy including risks and benefits and need for periodic renal function studies were reviewed.     Avoid eating late at night, eating large meals, eating fatty foods, chocolate, mints, fried food, or food with high acid content such as orange juice or tomato paste.

## 2018-04-24 NOTE — PROGRESS NOTES
Subjective   Brittny Fay is a 83 y.o.. female is here today for follow-up.    Chief Complaint   Patient presents with   • GI Problem     Discuss EGD Results     History of Present Illness  Patient has a long history of GERD.  Recent endoscopy demonstrated esophagitis, and biopsies revealed intestinal metaplasia.  The problem is she feels well when she is on Nexium but all of the adverse reports plus her extensive Internet reading has made her almost phobic about using these medications.  The following portions of the patient's history were reviewed and updated as appropriate: allergies, current medications, past family history, past medical history, past social history, past surgical history and problem list.    Review of Systems   Respiratory: Negative for shortness of breath.    Cardiovascular: Negative for chest pain.   All other systems reviewed and are negative.      Objective   Physical Exam   Constitutional: She appears well-developed and well-nourished.   Nursing note and vitals reviewed.        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       She has long-standing GERD and Abreu's esophagus and the benefits of PPI outweigh the risks which I think are drastically over stated in the lay press.  Long-term use of PPIs advised.  Other items of concern were reviewed.  Like to see her back in 6 months.

## 2018-05-03 ENCOUNTER — OFFICE VISIT (OUTPATIENT)
Dept: INTERNAL MEDICINE | Facility: CLINIC | Age: 83
End: 2018-05-03

## 2018-05-03 VITALS
HEIGHT: 64 IN | TEMPERATURE: 98.3 F | OXYGEN SATURATION: 96 % | SYSTOLIC BLOOD PRESSURE: 112 MMHG | HEART RATE: 80 BPM | WEIGHT: 137 LBS | DIASTOLIC BLOOD PRESSURE: 60 MMHG | BODY MASS INDEX: 23.39 KG/M2 | RESPIRATION RATE: 16 BRPM

## 2018-05-03 DIAGNOSIS — N28.1 RENAL CYST, LEFT: ICD-10-CM

## 2018-05-03 DIAGNOSIS — K21.00 GASTROESOPHAGEAL REFLUX DISEASE WITH ESOPHAGITIS: ICD-10-CM

## 2018-05-03 DIAGNOSIS — J30.2 CHRONIC SEASONAL ALLERGIC RHINITIS, UNSPECIFIED TRIGGER: ICD-10-CM

## 2018-05-03 DIAGNOSIS — G47.00 INSOMNIA, UNSPECIFIED TYPE: ICD-10-CM

## 2018-05-03 DIAGNOSIS — N32.81 OVERACTIVE BLADDER: ICD-10-CM

## 2018-05-03 DIAGNOSIS — R73.01 ELEVATED FASTING GLUCOSE: ICD-10-CM

## 2018-05-03 DIAGNOSIS — M85.88 OSTEOPENIA OF SPINE: ICD-10-CM

## 2018-05-03 DIAGNOSIS — I10 BENIGN ESSENTIAL HYPERTENSION: Primary | ICD-10-CM

## 2018-05-03 DIAGNOSIS — E55.9 VITAMIN D DEFICIENCY: ICD-10-CM

## 2018-05-03 DIAGNOSIS — K22.70 BARRETT'S ESOPHAGUS WITHOUT DYSPLASIA: ICD-10-CM

## 2018-05-03 PROBLEM — K22.719 BARRETT'S ESOPHAGUS WITH DYSPLASIA: Status: ACTIVE | Noted: 2018-05-03

## 2018-05-03 PROCEDURE — 99214 OFFICE O/P EST MOD 30 MIN: CPT | Performed by: FAMILY MEDICINE

## 2018-05-03 NOTE — PROGRESS NOTES
Subjective     Brittny Fay is a 83 y.o. female, who presents with a chief complaint of   Chief Complaint   Patient presents with   • Hypertension   • Heartburn     Hypertension     Heartburn     1. HTN. Still tolerating amlodipine.  Denies chest pain, dizziness.    2. GERD/Abreu's.  She recently saw Dr. Weber and had EGD and was diagnosed with Abreu's and esophagitis.  Started esomeprazole 2 weeks.  She has concerns about long term use of PPIs.    3. Insomnia.  She is still requiring medication to obtain adequate sleep.    The following portions of the patient's history were reviewed and updated as appropriate: allergies, current medications, past family history, past medical history, past social history, past surgical history and problem list.    Allergies: Amitriptyline; Bupropion; and Pneumococcal vaccines    Review of Systems   Constitutional: Negative.    HENT: Negative.    Eyes: Negative.    Respiratory: Negative.    Cardiovascular: Negative.    Gastrointestinal: Negative.    Endocrine: Negative.    Genitourinary: Negative.    Musculoskeletal: Negative.    Skin: Negative.    Allergic/Immunologic: Positive for environmental allergies.   Neurological: Negative.    Psychiatric/Behavioral: Positive for sleep disturbance.       Objective     Wt Readings from Last 3 Encounters:   05/03/18 62.1 kg (137 lb)   04/24/18 62.6 kg (138 lb)   04/02/18 62.3 kg (137 lb 6 oz)     Temp Readings from Last 3 Encounters:   05/03/18 98.3 °F (36.8 °C)   04/02/18 98 °F (36.7 °C) (Oral)   02/12/18 98 °F (36.7 °C)     BP Readings from Last 3 Encounters:   05/03/18 112/60   04/02/18 119/71   02/12/18 120/60     Pulse Readings from Last 3 Encounters:   05/03/18 80   04/02/18 71   02/12/18 77     Body mass index is 23.5 kg/m².  SpO2 Readings from Last 3 Encounters:   02/12/18 98%   09/25/17 97%   08/09/17 98%       Physical Exam   Constitutional: She is oriented to person, place, and time. She appears well-developed and  "well-nourished.   HENT:   Head: Normocephalic and atraumatic.   Mouth/Throat: Oropharynx is clear and moist.   Eyes: Conjunctivae and EOM are normal.   Neck: Neck supple. No thyromegaly present.   Cardiovascular: Normal rate, regular rhythm and normal heart sounds.    Pulmonary/Chest: Effort normal and breath sounds normal.   Abdominal: Soft. Bowel sounds are normal. There is no hepatosplenomegaly.   Musculoskeletal: Normal range of motion. She exhibits no edema.   Neurological: She is alert and oriented to person, place, and time.   Skin: Skin is warm and dry. No rash noted.   Psychiatric: She has a normal mood and affect. Her behavior is normal.   Nursing note and vitals reviewed.      Results for orders placed or performed during the hospital encounter of 04/02/18   Tissue Pathology Exam   Result Value Ref Range    Case Report       Surgical Pathology Report                         Case: [id number]                                  Authorizing Provider:  Josiah Weber MD        Collected:           04/02/2018 08:19 AM          Ordering Location:     [facility]  Received:            04/02/2018 09:01 AM                                 ENDO SUITES                                                                  Pathologist:           Tanvir Domínguez MD                                                        Specimen:    Esophagus, GE JUNCTION BIOPSIES                                                            Final Diagnosis       1: GASTROESOPHAGEAL JUNCTION, BIOPSIES:   INTESTINAL METAPLASIA.   NO IDENTIFIED DYSPLASIA.   PATCHY MIXED INFLAMMATION WITH REPARATIVE ATYPIA.   MINIMAL UNREMARKABLE SQUAMOUS EPITHELIUM.   NO INCREASE OF EOSINOPHILS.    IHC/a/CMK   SIRISHA/jse    CPT CODES:  1: 17699, 3126F      Gross Description       1. Received in formalin designated \"GE junction\" is a tan tissue forming an aggregate 4 x 3 x 2 mm.  CMK/pkm       Microscopic Description       Performed, incorporated in " diagnosis.       Embedded Images         Assessment/Plan   Brittny was seen today for heartburn and hypertension.    Diagnoses and all orders for this visit:    Benign essential hypertension  -     Comprehensive Metabolic Panel; Future  -     Lipid Panel With / Chol / HDL Ratio; Future  -     TSH; Future    Seasonal allergic rhinitis, unspecified allergic rhinitis trigger    Gastroesophageal reflux disease without esophagitis  -     CBC & Differential; Future    Overactive bladder    Osteopenia    Insomnia, unspecified type    Elevated fasting glucose  -     Hemoglobin A1c; Future  -     Vitamin B12; Future    Renal cyst, left    1. HTN.  Controlled.  Continue amlodipine.  Last labs reviewed.     2. TWIN.  Controlled with nasal steroid.    3. GERD/Barretts.  Started esomeprazole 40 mg by Dr. Weber.    4. OAB/bladder prolapse.  Resolved after surgical repair.    5. Osteopenia.  Continue calcium and vitamin D, weight bearing exercise.  Next dexa scan 2/2019.    6. Insomnia.  Alternates lorazepam and zolpidem to prevent tolerance.  Med management agreement and Carlos UTD.    7. Elevated fasting glucose.  Improved.  A1c 5.6.  Continue lifestyle measures.    8. Left renal cyst.  Previously reported as complex.  Read as simple this last time on u/s.  We will plan to repeat this in May of 2018.  Ordered today.    9. Routine health maint.  Offered annual wellness exam and patient declined.  UTD on vaccines.  Mammogram done 8/2017.  `  Outpatient Medications Prior to Visit   Medication Sig Dispense Refill   • amLODIPine (NORVASC) 10 MG tablet TAKE 1 TABLET EVERY MORNINGFOR HIGH BLOOD PRESSURE 90 tablet 3   • ascorbic acid (VITAMIN C) 100 MG tablet Take 100 mg by mouth.     • calcium carbonate (OS-JOSIANE) 600 MG tablet Take 600 mg by mouth 2 (Two) Times a Day With Meals.     • cholecalciferol (VITAMIN D3) 1000 units tablet Take 1,000 Units by mouth Daily.     • esomeprazole (nexIUM) 40 MG capsule Take 1 capsule by mouth Daily. 30  capsule 11   • Estriol 10 % cream      • fluticasone (FLONASE) 50 MCG/ACT nasal spray 2 sprays into each nostril daily. 1 each 2   • LORazepam (ATIVAN) 1 MG tablet Take 1 tablet by mouth Daily As Needed for Anxiety. 90 tablet 1   • Multiple Vitamins-Minerals (MULTIPLE VITAMINS/WOMENS PO) Take  by mouth.     • Probiotic Product (PROBIOTIC PO) Take  by mouth.     • Wheat Dextrin (BENEFIBER PO) Take  by mouth.     • zolpidem (AMBIEN) 10 MG tablet Take 1 tablet by mouth At Night As Needed for Sleep. For insomnia 90 tablet 1     No facility-administered medications prior to visit.      No orders of the defined types were placed in this encounter.    [unfilled]  There are no discontinued medications.    Return in about 6 months (around 11/3/2018).

## 2018-05-08 ENCOUNTER — RESULTS ENCOUNTER (OUTPATIENT)
Dept: INTERNAL MEDICINE | Facility: CLINIC | Age: 83
End: 2018-05-08

## 2018-05-08 DIAGNOSIS — K21.00 GASTROESOPHAGEAL REFLUX DISEASE WITH ESOPHAGITIS: ICD-10-CM

## 2018-05-08 DIAGNOSIS — R73.01 ELEVATED FASTING GLUCOSE: ICD-10-CM

## 2018-05-08 DIAGNOSIS — E55.9 VITAMIN D DEFICIENCY: ICD-10-CM

## 2018-05-08 DIAGNOSIS — I10 BENIGN ESSENTIAL HYPERTENSION: ICD-10-CM

## 2018-05-08 DIAGNOSIS — K22.70 BARRETT'S ESOPHAGUS WITHOUT DYSPLASIA: ICD-10-CM

## 2018-05-21 ENCOUNTER — APPOINTMENT (OUTPATIENT)
Dept: ULTRASOUND IMAGING | Facility: HOSPITAL | Age: 83
End: 2018-05-21
Attending: FAMILY MEDICINE

## 2018-05-22 ENCOUNTER — HOSPITAL ENCOUNTER (OUTPATIENT)
Dept: ULTRASOUND IMAGING | Facility: HOSPITAL | Age: 83
Discharge: HOME | End: 2018-05-22
Attending: FAMILY MEDICINE | Admitting: FAMILY MEDICINE

## 2018-05-22 DIAGNOSIS — N28.1 RENAL CYST, LEFT: ICD-10-CM

## 2018-05-22 PROCEDURE — 76775 US EXAM ABDO BACK WALL LIM: CPT

## 2018-05-25 ENCOUNTER — TELEPHONE (OUTPATIENT)
Dept: INTERNAL MEDICINE | Facility: CLINIC | Age: 83
End: 2018-05-25

## 2018-05-30 ENCOUNTER — TELEPHONE (OUTPATIENT)
Dept: GASTROENTEROLOGY | Facility: CLINIC | Age: 83
End: 2018-05-30

## 2018-05-30 RX ORDER — ESOMEPRAZOLE MAGNESIUM 40 MG/1
40 CAPSULE, DELAYED RELEASE ORAL DAILY
Qty: 90 CAPSULE | Refills: 3 | Status: SHIPPED | OUTPATIENT
Start: 2018-05-30 | End: 2019-05-29 | Stop reason: SDUPTHER

## 2018-05-30 RX ORDER — FLUTICASONE PROPIONATE 50 MCG
2 SPRAY, SUSPENSION (ML) NASAL DAILY
Qty: 1 BOTTLE | Refills: 2 | Status: SHIPPED | OUTPATIENT
Start: 2018-05-30

## 2018-05-30 NOTE — TELEPHONE ENCOUNTER
Pt is requesting refill on Nexium to be sent to mail order pharmacy:  Nexium 40 mg once daily, 90 day supply, [facility].    Pt notified that Dr Weber [facility].

## 2018-07-12 DIAGNOSIS — F41.1 GENERALIZED ANXIETY DISORDER: ICD-10-CM

## 2018-07-12 RX ORDER — LORAZEPAM 1 MG/1
1 TABLET ORAL DAILY PRN
Qty: 90 TABLET | Refills: 1 | Status: SHIPPED | OUTPATIENT
Start: 2018-07-12 | End: 2018-10-08 | Stop reason: SDUPTHER

## 2018-07-18 ENCOUNTER — TRANSCRIBE ORDERS (OUTPATIENT)
Dept: ADMINISTRATIVE | Facility: HOSPITAL | Age: 83
End: 2018-07-18

## 2018-07-18 DIAGNOSIS — Z12.31 SCREENING MAMMOGRAM, ENCOUNTER FOR: Primary | ICD-10-CM

## 2018-08-06 LAB
25(OH)D3+25(OH)D2 SERPL-MCNC: >60 NG/ML
ALBUMIN SERPL-MCNC: 4.3 G/DL (ref 3.5–5.2)
ALBUMIN/GLOB SERPL: 2 G/DL
ALP SERPL-CCNC: 81 U/L (ref 40–129)
ALT SERPL-CCNC: 10 U/L (ref 5–33)
AST SERPL-CCNC: 17 U/L (ref 5–32)
BASOPHILS # BLD AUTO: 0.04 10*3/MM3 (ref 0–0.2)
BASOPHILS NFR BLD AUTO: 0.7 % (ref 0–2)
BILIRUB SERPL-MCNC: 0.5 MG/DL (ref 0.2–1.2)
BUN SERPL-MCNC: 16 MG/DL (ref 8–23)
BUN/CREAT SERPL: 21.6 (ref 7–25)
CALCIUM SERPL-MCNC: 9.2 MG/DL (ref 8.8–10.5)
CHLORIDE SERPL-SCNC: 101 MMOL/L (ref 98–107)
CHOLEST SERPL-MCNC: 149 MG/DL (ref 0–200)
CHOLEST/HDLC SERPL: 2.81 {RATIO}
CO2 SERPL-SCNC: 30 MMOL/L (ref 22–29)
CREAT SERPL-MCNC: 0.74 MG/DL (ref 0.57–1)
EOSINOPHIL # BLD AUTO: 0.16 10*3/MM3 (ref 0.1–0.3)
EOSINOPHIL NFR BLD AUTO: 2.9 % (ref 0–4)
ERYTHROCYTE [DISTWIDTH] IN BLOOD BY AUTOMATED COUNT: 14.1 % (ref 11.5–14.5)
GLOBULIN SER CALC-MCNC: 2.2 GM/DL
GLUCOSE SERPL-MCNC: 88 MG/DL (ref 65–99)
HBA1C MFR BLD: 5.8 % (ref 4.8–5.6)
HCT VFR BLD AUTO: 43.3 % (ref 37–47)
HDLC SERPL-MCNC: 53 MG/DL (ref 40–60)
HGB BLD-MCNC: 13.7 G/DL (ref 12–16)
IMM GRANULOCYTES # BLD: 0.01 10*3/MM3 (ref 0–0.03)
IMM GRANULOCYTES NFR BLD: 0.2 % (ref 0–0.5)
LDLC SERPL CALC-MCNC: 81 MG/DL (ref 0–100)
LYMPHOCYTES # BLD AUTO: 2.02 10*3/MM3 (ref 0.6–4.8)
LYMPHOCYTES NFR BLD AUTO: 37.1 % (ref 20–45)
MAGNESIUM SERPL-MCNC: 1.9 MG/DL (ref 1.7–2.5)
MCH RBC QN AUTO: 28.3 PG (ref 27–31)
MCHC RBC AUTO-ENTMCNC: 31.6 G/DL (ref 31–37)
MCV RBC AUTO: 89.5 FL (ref 81–99)
MONOCYTES # BLD AUTO: 0.53 10*3/MM3 (ref 0–1)
MONOCYTES NFR BLD AUTO: 9.7 % (ref 3–8)
NEUTROPHILS # BLD AUTO: 2.69 10*3/MM3 (ref 1.5–8.3)
NEUTROPHILS NFR BLD AUTO: 49.4 % (ref 45–70)
NRBC BLD AUTO-RTO: 0 /100 WBC (ref 0–0)
PLATELET # BLD AUTO: 241 10*3/MM3 (ref 140–500)
POTASSIUM SERPL-SCNC: 4.1 MMOL/L (ref 3.5–5.2)
PROT SERPL-MCNC: 6.5 G/DL (ref 6–8.5)
RBC # BLD AUTO: 4.84 10*6/MM3 (ref 4.2–5.4)
SODIUM SERPL-SCNC: 143 MMOL/L (ref 136–145)
TRIGL SERPL-MCNC: 76 MG/DL (ref 0–150)
TSH SERPL DL<=0.005 MIU/L-ACNC: 1.41 MIU/ML (ref 0.27–4.2)
VIT B12 SERPL-MCNC: 1994 PG/ML
VLDLC SERPL CALC-MCNC: 15.2 MG/DL (ref 7–27)
WBC # BLD AUTO: 5.45 10*3/MM3 (ref 4.8–10.8)

## 2018-08-13 ENCOUNTER — OFFICE VISIT (OUTPATIENT)
Dept: INTERNAL MEDICINE | Facility: CLINIC | Age: 83
End: 2018-08-13

## 2018-08-13 VITALS
RESPIRATION RATE: 16 BRPM | WEIGHT: 120.5 LBS | SYSTOLIC BLOOD PRESSURE: 106 MMHG | OXYGEN SATURATION: 98 % | DIASTOLIC BLOOD PRESSURE: 66 MMHG | BODY MASS INDEX: 20.57 KG/M2 | HEART RATE: 84 BPM | TEMPERATURE: 97.8 F | HEIGHT: 64 IN

## 2018-08-13 DIAGNOSIS — Z00.00 ROUTINE HEALTH MAINTENANCE: ICD-10-CM

## 2018-08-13 DIAGNOSIS — K21.00 GASTROESOPHAGEAL REFLUX DISEASE WITH ESOPHAGITIS: ICD-10-CM

## 2018-08-13 DIAGNOSIS — J30.2 CHRONIC SEASONAL ALLERGIC RHINITIS, UNSPECIFIED TRIGGER: ICD-10-CM

## 2018-08-13 DIAGNOSIS — M62.89 PELVIC FLOOR DYSFUNCTION: ICD-10-CM

## 2018-08-13 DIAGNOSIS — G47.00 INSOMNIA, UNSPECIFIED TYPE: ICD-10-CM

## 2018-08-13 DIAGNOSIS — I10 BENIGN ESSENTIAL HYPERTENSION: Primary | ICD-10-CM

## 2018-08-13 DIAGNOSIS — N32.81 OVERACTIVE BLADDER: ICD-10-CM

## 2018-08-13 DIAGNOSIS — R73.01 ELEVATED FASTING GLUCOSE: ICD-10-CM

## 2018-08-13 DIAGNOSIS — N28.1 RENAL CYST, LEFT: ICD-10-CM

## 2018-08-13 DIAGNOSIS — K22.719 BARRETT'S ESOPHAGUS WITH DYSPLASIA: ICD-10-CM

## 2018-08-13 DIAGNOSIS — M85.88 OSTEOPENIA OF SPINE: ICD-10-CM

## 2018-08-13 PROCEDURE — 99214 OFFICE O/P EST MOD 30 MIN: CPT | Performed by: FAMILY MEDICINE

## 2018-08-13 RX ORDER — AMLODIPINE BESYLATE 5 MG/1
5 TABLET ORAL DAILY
Qty: 90 TABLET | Refills: 3 | Status: SHIPPED | OUTPATIENT
Start: 2018-08-13 | End: 2019-07-29 | Stop reason: SDUPTHER

## 2018-08-13 RX ORDER — THIAMINE MONONITRATE (VIT B1) 100 MG
100 TABLET ORAL DAILY
COMMUNITY
End: 2018-09-20

## 2018-08-13 RX ORDER — DOXEPIN HYDROCHLORIDE 3 MG/1
3 TABLET ORAL NIGHTLY
Qty: 30 TABLET | Refills: 6 | Status: SHIPPED | OUTPATIENT
Start: 2018-08-13 | End: 2018-08-17 | Stop reason: SDUPTHER

## 2018-08-13 NOTE — PROGRESS NOTES
Subjective     Brittny Fay is a 83 y.o. female, who presents with a chief complaint of   Chief Complaint   Patient presents with   • Hypertension     Hypertension     Heartburn     1. HTN.  Still tolerating amlodipine.  Denies chest pain, dizziness.  She has been watching her diet due to her the Abreu's and has lost some weight.  Home blood pressures running low 100s.    2. GERD/Abreu's.  She recently saw Dr. Weber and had EGD and was diagnosed with Abreu's and esophagitis.  Started esomeprazole 2 weeks.  She has concerns about long term use of PPIs.    3. Insomnia.  Zolpidem not effective anymore.  Trazodone didn't help either.    The following portions of the patient's history were reviewed and updated as appropriate: allergies, current medications, past family history, past medical history, past social history, past surgical history and problem list.    Allergies: Amitriptyline; Bupropion; and Pneumococcal vaccines    Review of Systems   Constitutional: Negative.    HENT: Negative.    Eyes: Negative.    Respiratory: Negative.    Cardiovascular: Negative.    Gastrointestinal: Negative.    Endocrine: Negative.    Genitourinary: Negative.    Musculoskeletal: Negative.    Skin: Negative.    Allergic/Immunologic: Positive for environmental allergies.   Neurological: Negative.    Psychiatric/Behavioral: Positive for sleep disturbance.       Objective     Wt Readings from Last 3 Encounters:   08/13/18 54.7 kg (120 lb 8 oz)   05/03/18 62.1 kg (137 lb)   04/24/18 62.6 kg (138 lb)     Temp Readings from Last 3 Encounters:   08/13/18 97.8 °F (36.6 °C)   05/03/18 98.3 °F (36.8 °C)   04/02/18 98 °F (36.7 °C) (Oral)     BP Readings from Last 3 Encounters:   08/13/18 106/66   05/03/18 112/60   04/02/18 119/71     Pulse Readings from Last 3 Encounters:   08/13/18 84   05/03/18 80   04/02/18 71     Body mass index is 20.68 kg/m².  SpO2 Readings from Last 3 Encounters:   02/12/18 98%   09/25/17 97%   08/09/17 98%        Physical Exam   Constitutional: She is oriented to person, place, and time. She appears well-developed and well-nourished.   HENT:   Head: Normocephalic and atraumatic.   Mouth/Throat: Oropharynx is clear and moist.   Eyes: Conjunctivae and EOM are normal.   Neck: Neck supple. No thyromegaly present.   Cardiovascular: Normal rate, regular rhythm and normal heart sounds.    Pulmonary/Chest: Effort normal and breath sounds normal.   Abdominal: Soft. Bowel sounds are normal. There is no hepatosplenomegaly.   Musculoskeletal: Normal range of motion. She exhibits no edema.   Neurological: She is alert and oriented to person, place, and time.   Skin: Skin is warm and dry. No rash noted.   Psychiatric: She has a normal mood and affect. Her behavior is normal.   Nursing note and vitals reviewed.      Results for orders placed or performed in visit on 05/08/18   Comprehensive Metabolic Panel   Result Value Ref Range    Glucose 88 65 - 99 mg/dL    BUN 16 8 - 23 mg/dL    Creatinine 0.74 0.57 - 1.00 mg/dL    eGFR Non African Am 75 >60 mL/min/1.73    eGFR African Am 91 >60 mL/min/1.73    BUN/Creatinine Ratio 21.6 7.0 - 25.0    Sodium 143 136 - 145 mmol/L    Potassium 4.1 3.5 - 5.2 mmol/L    Chloride 101 98 - 107 mmol/L    Total CO2 30.0 (H) 22.0 - 29.0 mmol/L    Calcium 9.2 8.8 - 10.5 mg/dL    Total Protein 6.5 6.0 - 8.5 g/dL    Albumin 4.30 3.50 - 5.20 g/dL    Globulin 2.2 gm/dL    A/G Ratio 2.0 g/dL    Total Bilirubin 0.5 0.2 - 1.2 mg/dL    Alkaline Phosphatase 81 40 - 129 U/L    AST (SGOT) 17 5 - 32 U/L    ALT (SGPT) 10 5 - 33 U/L   Hemoglobin A1c   Result Value Ref Range    Hemoglobin A1C 5.80 (H) 4.80 - 5.60 %   Lipid Panel With / Chol / HDL Ratio   Result Value Ref Range    Total Cholesterol 149 0 - 200 mg/dL    Triglycerides 76 0 - 150 mg/dL    HDL Cholesterol 53 40 - 60 mg/dL    VLDL Cholesterol 15.2 7 - 27 mg/dL    LDL Cholesterol  81 0 - 100 mg/dL    Chol/HDL Ratio 2.81    Vitamin B12   Result Value Ref Range     Vitamin B-12 1,994 (H) 232-1,245 pg/mL   Vitamin D 25 Hydroxy   Result Value Ref Range    25 Hydroxy, Vitamin D >60.0 ng/ml   TSH   Result Value Ref Range    TSH 1.410 0.270 - 4.200 mIU/mL   Magnesium   Result Value Ref Range    Magnesium 1.9 1.7 - 2.5 mg/dL   CBC & Differential   Result Value Ref Range    WBC 5.45 4.80 - 10.80 10*3/mm3    RBC 4.84 4.20 - 5.40 10*6/mm3    Hemoglobin 13.7 12.0 - 16.0 g/dL    Hematocrit 43.3 37.0 - 47.0 %    MCV 89.5 81.0 - 99.0 fL    MCH 28.3 27.0 - 31.0 pg    MCHC 31.6 31.0 - 37.0 g/dL    RDW 14.1 11.5 - 14.5 %    Platelets 241 140 - 500 10*3/mm3    Neutrophil Rel % 49.4 45.0 - 70.0 %    Lymphocyte Rel % 37.1 20.0 - 45.0 %    Monocyte Rel % 9.7 (H) 3.0 - 8.0 %    Eosinophil Rel % 2.9 0.0 - 4.0 %    Basophil Rel % 0.7 0.0 - 2.0 %    Neutrophils Absolute 2.69 1.50 - 8.30 10*3/mm3    Lymphocytes Absolute 2.02 0.60 - 4.80 10*3/mm3    Monocytes Absolute 0.53 0.00 - 1.00 10*3/mm3    Eosinophils Absolute 0.16 0.10 - 0.30 10*3/mm3    Basophils Absolute 0.04 0.00 - 0.20 10*3/mm3    Immature Granulocyte Rel % 0.2 0.0 - 0.5 %    Immature Grans Absolute 0.01 0.00 - 0.03 10*3/mm3    nRBC 0.0 0.0 - 0.0 /100 WBC       Assessment/Plan   Brittny was seen today for heartburn and hypertension.    Diagnoses and all orders for this visit:    Benign essential hypertension  -     Comprehensive Metabolic Panel; Future  -     Lipid Panel With / Chol / HDL Ratio; Future  -     TSH; Future    Seasonal allergic rhinitis, unspecified allergic rhinitis trigger    Gastroesophageal reflux disease without esophagitis  -     CBC & Differential; Future    Overactive bladder    Osteopenia    Insomnia, unspecified type    Elevated fasting glucose  -     Hemoglobin A1c; Future  -     Vitamin B12; Future    Renal cyst, left    1. HTN.  BP running low d/t weight loss.  Decrease amlodipine to 5 mg daily.  Monitor home blood pressures.    2. TWIN.  Controlled with nasal steroid.    3. GERD/Barretts.  Esomeprazole 40 mg by   Tia.  She can increase fat intake.  Lifestyle measures discussed and anticipatory guidance given.  I don't want her to lose anymore weight.    4. OAB/bladder prolapse.  Improved after surgical repair.    5. Osteopenia.  Continue calcium and vitamin D, weight bearing exercise.  Next dexa scan 2/2019.    6. Insomnia.  Alternates lorazepam and zolpidem to prevent tolerance.  Zolpidem not helping.  Trial of doxepin.  Med management agreement and Carlos UTD.    7. Elevated fasting glucose.  Improved.  A1c 5.8.  Continue lifestyle measures.    8. Left renal cyst.  Previously reported as complex.  Read as simple this last time on u/s.  We will plan to repeat this in May of 2018.  Ordered today.    9. Routine health maint.  Offered annual wellness exam and patient declined.  UTD on vaccines.  Mammogram scheduled.  `  Outpatient Medications Prior to Visit   Medication Sig Dispense Refill   • ascorbic acid (VITAMIN C) 100 MG tablet Take 100 mg by mouth.     • B Complex Vitamins (VITAMIN B COMPLEX PO) Take  by mouth.     • calcium carbonate (OS-JOSIANE) 600 MG tablet Take 600 mg by mouth 2 (Two) Times a Day With Meals.     • cholecalciferol (VITAMIN D3) 1000 units tablet Take 1,000 Units by mouth Daily.     • esomeprazole (nexIUM) 40 MG capsule Take 1 capsule by mouth Daily. 90 capsule 3   • fluticasone (FLONASE) 50 MCG/ACT nasal spray 2 sprays into each nostril Daily. 1 bottle 2   • LORazepam (ATIVAN) 1 MG tablet Take 1 tablet by mouth Daily As Needed for Anxiety. 90 tablet 1   • Multiple Vitamins-Minerals (MULTIPLE VITAMINS/WOMENS PO) Take  by mouth.     • Probiotic Product (PROBIOTIC PO) Take  by mouth.     • Wheat Dextrin (BENEFIBER PO) Take  by mouth.     • zolpidem (AMBIEN) 10 MG tablet Take 1 tablet by mouth At Night As Needed for Sleep. For insomnia 90 tablet 1   • amLODIPine (NORVASC) 10 MG tablet TAKE 1 TABLET EVERY MORNINGFOR HIGH BLOOD PRESSURE 90 tablet 3   • Estriol 10 % cream        No facility-administered  medications prior to visit.      New Medications Ordered This Visit   Medications   • amLODIPine (NORVASC) 5 MG tablet     Sig: Take 1 tablet by mouth Daily.     Dispense:  90 tablet     Refill:  3   • Doxepin HCl 3 MG tablet     Sig: Take 3 mg by mouth Every Night.     Dispense:  30 tablet     Refill:  6     [unfilled]  Medications Discontinued During This Encounter   Medication Reason   • Estriol 10 % cream *Therapy completed   • amLODIPine (NORVASC) 10 MG tablet Reorder       Return in about 6 months (around 2/13/2019).

## 2018-08-16 DIAGNOSIS — G47.00 INSOMNIA, UNSPECIFIED TYPE: Primary | ICD-10-CM

## 2018-08-16 RX ORDER — DOXEPIN HYDROCHLORIDE 10 MG/ML
5 SOLUTION ORAL NIGHTLY
Qty: 118 ML | Refills: 5 | Status: SHIPPED | OUTPATIENT
Start: 2018-08-16 | End: 2018-08-17

## 2018-08-17 ENCOUNTER — TELEPHONE (OUTPATIENT)
Dept: INTERNAL MEDICINE | Facility: CLINIC | Age: 83
End: 2018-08-17

## 2018-08-17 DIAGNOSIS — G47.00 INSOMNIA, UNSPECIFIED TYPE: ICD-10-CM

## 2018-08-17 RX ORDER — DOXEPIN HYDROCHLORIDE 3 MG/1
3 TABLET ORAL NIGHTLY
Qty: 30 TABLET | Refills: 6 | Status: SHIPPED | OUTPATIENT
Start: 2018-08-17 | End: 2018-09-20

## 2018-08-17 NOTE — TELEPHONE ENCOUNTER
----- Message from Josie Kapadia MA sent at 8/17/2018  8:58 AM EDT -----  p  ----- Message -----  From: Remedios Sorto MD  Sent: 8/16/2018   6:08 PM  To: Farzana Ruiz [facility]    I had to give her the liquid because I think the 10 mg capsule will be too much for her.    ----- Message -----  From: Gladis Garner MA  Sent: 8/14/2018   3:33 PM  To: Remedios Sorto MD    Pharmacy  Told  Pt   That we need to change to  Different form of doxepin     As   silenor   Is   $549  (needs Pa)  But  Pt  Wanted to just change medication instead of me getting pa.     Send to local pharmacy    shala        [phone]

## 2018-08-23 NOTE — TELEPHONE ENCOUNTER
Pt  Called her insurance    No pa needed for  belsomra   Higher teir  She will have to pay   105  For   90 days.   Sent to [facility]   Pt aware    ----- Message from Gladis Garner MA sent at 8/23/2018  2:10 PM EDT -----  Pt is going to  Check w/insurance on this   Will see if they are going to take money  Out  Her  Fund to cover this  Will call back  ----- Message -----  From: Remedios Sorto MD  Sent: 8/23/2018  12:36 PM  To: Gladis Garner MA    I wonder if insurance would cover Belsomra since she's failed so many things (zolpidem, trazodone, Silenor, benzos).  She could take Belsomra 10 mg and can work up to 20 mg if needed.  She has to be on it for a while to determine if it's working.    ----- Message -----  From: Gladis Garner MA  Sent: 8/23/2018  10:39 AM  To: Remedios Sorto MD    Per  Pt she has already  Tried taking   2  Does not work  ----- Message -----  From: Josie Kapadia MA  Sent: 8/22/2018   1:35 PM  To: Gladis Garner MA    p  ----- Message -----  From: Remedios Sorto MD  Sent: 8/22/2018   1:22 PM  To: [address] [facility] [place]    If she is tolerating it, she should go up to 2 tablets at night.  If that doesn't work, let us know because I don't want her going higher than 6 mg.    ----- Message -----  From: Gladis Garner MA  Sent: 8/21/2018   2:54 PM  To: Remedios Sorto MD    Pt called to let us know  That silenor (doxepin) is not working for  Sleep.   She is taking  3 mg.     Advise     [phone]

## 2018-08-24 ENCOUNTER — HOSPITAL ENCOUNTER (OUTPATIENT)
Dept: MAMMOGRAPHY | Facility: HOSPITAL | Age: 83
Discharge: HOME | End: 2018-08-24
Attending: FAMILY MEDICINE | Admitting: FAMILY MEDICINE

## 2018-08-24 ENCOUNTER — TELEPHONE (OUTPATIENT)
Dept: INTERNAL MEDICINE | Facility: CLINIC | Age: 83
End: 2018-08-24

## 2018-08-24 DIAGNOSIS — Z12.31 SCREENING MAMMOGRAM, ENCOUNTER FOR: ICD-10-CM

## 2018-08-24 PROCEDURE — 77063 BREAST TOMOSYNTHESIS BI: CPT

## 2018-08-24 PROCEDURE — 77067 SCR MAMMO BI INCL CAD: CPT

## 2018-08-24 NOTE — TELEPHONE ENCOUNTER
Electronic  Script  Failed, I called and soke to pharmacist and ordered medication        ----- Message from Gladis Garner MA sent at 8/23/2018  2:10 PM EDT -----  Pt is going to  Check w/insurance on this   Will see if they are going to take money  Out  Her  Fund to cover this  Will call back  ----- Message -----  From: Remedios Sorto MD  Sent: 8/23/2018  12:36 PM  To: Gladis Garner MA    I wonder if insurance would cover Belsomra since she's failed so many things (zolpidem, trazodone, Silenor, benzos).  She could take Belsomra 10 mg and can work up to 20 mg if needed.  She has to be on it for a while to determine if it's working.    ----- Message -----  From: Gladis Garner MA  Sent: 8/23/2018  10:39 AM  To: Remedios Sorto MD    Per  Pt she has already  Tried taking   2  Does not work  ----- Message -----  From: Josie Kapadia MA  Sent: 8/22/2018   1:35 PM  To: Gladis Garner MA    p  ----- Message -----  From: Remedios Sorto MD  Sent: 8/22/2018   1:22 PM  To: [address] [facility] [place]    If she is tolerating it, she should go up to 2 tablets at night.  If that doesn't work, let us know because I don't want her going higher than 6 mg.    ----- Message -----  From: Gladis Garner MA  Sent: 8/21/2018   2:54 PM  To: Remedios Sorto MD    Pt called to let us know  That silenor (doxepin) is not working for  Sleep.   She is taking  3 mg.     Advise     [phone]

## 2018-09-10 ENCOUNTER — TELEPHONE (OUTPATIENT)
Dept: INTERNAL MEDICINE | Facility: CLINIC | Age: 83
End: 2018-09-10

## 2018-09-10 NOTE — TELEPHONE ENCOUNTER
Patient scheduled 9/20.    ----- Message from Remedios Sorto MD sent at 9/10/2018  3:51 PM EDT -----  Can she schedule an appointment to discuss anxiety?  We're running out of options for sleep.  ----- Message -----  From: Josie Kapadia MA  Sent: 9/10/2018   1:20 PM  To: Remedios Sorto MD    Patient states she was on Lunesta several years ago and this does not keep her asleep either.  I asked her if she wakes up worrying about things.  She said that her concerns are what keeps her awake.  She is not on an antidepressant......  ----- Message -----  From: Remedios Sorto MD  Sent: 9/10/2018  12:49 PM  To: Farzana Ruiz [facility]    Has she tried Lunesta in the past and if so, what dose?  This tends to last longer than Ambien.  ----- Message -----  From: Josie Kapadia MA  Sent: 9/6/2018   8:29 AM  To: Remedios Sorto MD    Patient states she is not sleeping with Belsomra and feels drunk in the morning.  She had a supply of Ambien left over, and has restarted that; however that does not keep her asleep all night.  She is aware that you will not be back until next week and will take the Ambien until you can address this.

## 2018-09-20 ENCOUNTER — OFFICE VISIT (OUTPATIENT)
Dept: INTERNAL MEDICINE | Facility: CLINIC | Age: 83
End: 2018-09-20

## 2018-09-20 VITALS
HEART RATE: 68 BPM | WEIGHT: 118 LBS | BODY MASS INDEX: 20.14 KG/M2 | HEIGHT: 64 IN | SYSTOLIC BLOOD PRESSURE: 110 MMHG | DIASTOLIC BLOOD PRESSURE: 58 MMHG | TEMPERATURE: 97.4 F | OXYGEN SATURATION: 98 % | RESPIRATION RATE: 18 BRPM

## 2018-09-20 DIAGNOSIS — F41.8 DEPRESSION WITH ANXIETY: Primary | ICD-10-CM

## 2018-09-20 PROCEDURE — 99213 OFFICE O/P EST LOW 20 MIN: CPT | Performed by: FAMILY MEDICINE

## 2018-09-20 RX ORDER — CHOLECALCIFEROL (VITAMIN D3) 125 MCG
500 CAPSULE ORAL DAILY
COMMUNITY

## 2018-09-20 RX ORDER — ESCITALOPRAM OXALATE 10 MG/1
10 TABLET ORAL DAILY
Qty: 30 TABLET | Refills: 5 | Status: SHIPPED | OUTPATIENT
Start: 2018-09-20 | End: 2019-01-02 | Stop reason: SDUPTHER

## 2018-09-20 NOTE — PROGRESS NOTES
Brittny Fay is a 83 y.o. female, who presents with a chief complaint of   Chief Complaint   Patient presents with   • Depression       HPI     Pt presents with the complaint of crying and feeling overwhelmed with family problems X 2 years.  She has a lot of difficulty sleeping, both falling asleep and staying asleep.  She feels very anxious.  She has not had any panic attacks.  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  She has had depression in the past.  She took Wellbutrin in the past, which wasn't very effective for her.  She recently started counseling as well.    The following portions of the patient's history were reviewed and updated as appropriate: allergies, current medications, past family history, past medical history, past social history, past surgical history and problem list.    Allergies: Amitriptyline; Bupropion; and Pneumococcal vaccines    Review of Systems   Constitutional: Positive for fatigue.   Skin: Negative.    Allergic/Immunologic: Negative.    Neurological: Negative.    Hematological: Negative.    Psychiatric/Behavioral: Positive for dysphoric mood. Negative for suicidal ideas. The patient is nervous/anxious.            Wt Readings from Last 3 Encounters:   09/20/18 53.5 kg (118 lb)   08/13/18 54.7 kg (120 lb 8 oz)   05/03/18 62.1 kg (137 lb)     Temp Readings from Last 3 Encounters:   09/20/18 97.4 °F (36.3 °C)   08/13/18 97.8 °F (36.6 °C)   05/03/18 98.3 °F (36.8 °C)     BP Readings from Last 3 Encounters:   09/20/18 110/58   08/13/18 106/66   05/03/18 112/60     Pulse Readings from Last 3 Encounters:   09/20/18 68   08/13/18 84   05/03/18 80     Body mass index is 20.25 kg/m².  @LASTSAO2(3)@    Physical Exam   Constitutional: She is oriented to person, place, and time. She appears well-developed and well-nourished.   HENT:   Head: Normocephalic and atraumatic.    Eyes: Conjunctivae and EOM are normal.   Neck: Normal range of motion. Neck supple.   Cardiovascular: Normal rate, regular rhythm and normal heart sounds.    No murmur heard.  Pulmonary/Chest: Effort normal. She has no wheezes.   Abdominal: Soft. There is no tenderness.   Musculoskeletal: Normal range of motion. She exhibits no edema.   Neurological: She is alert and oriented to person, place, and time.   Skin: Skin is warm and dry.   Psychiatric: She has a normal mood and affect. Her behavior is normal.   Nursing note and vitals reviewed.          Brittny was seen today for depression.    Diagnoses and all orders for this visit:    Depression with anxiety  -     escitalopram (LEXAPRO) 10 MG tablet; Take 1 tablet by mouth Daily.      Patient counseled.  Options discussed.  Start escitalopram 10 mg daily.  Continue counseling.  Start exercise.  F/U 4-6 weeks.    Outpatient Medications Prior to Visit   Medication Sig Dispense Refill   • amLODIPine (NORVASC) 5 MG tablet Take 1 tablet by mouth Daily. 90 tablet 3   • ascorbic acid (VITAMIN C) 100 MG tablet Take 100 mg by mouth.     • calcium carbonate (OS-JOSIANE) 600 MG tablet Take 600 mg by mouth 2 (Two) Times a Day With Meals.     • cholecalciferol (VITAMIN D3) 1000 units tablet Take 1,000 Units by mouth Daily.     • esomeprazole (nexIUM) 40 MG capsule Take 1 capsule by mouth Daily. 90 capsule 3   • fluticasone (FLONASE) 50 MCG/ACT nasal spray 2 sprays into each nostril Daily. 1 bottle 2   • LORazepam (ATIVAN) 1 MG tablet Take 1 tablet by mouth Daily As Needed for Anxiety. 90 tablet 1   • Multiple Vitamins-Minerals (MULTIPLE VITAMINS/WOMENS PO) Take  by mouth.     • Probiotic Product (PROBIOTIC PO) Take  by mouth.     • Wheat Dextrin (BENEFIBER PO) Take  by mouth.     • zolpidem (AMBIEN) 10 MG tablet Take 1 tablet by mouth At Night As Needed for Sleep. For insomnia 90 tablet 1   • B Complex Vitamins (VITAMIN B COMPLEX PO) Take  by mouth.     • Doxepin HCl 3 MG tablet Take  3 mg by mouth Every Night. 30 tablet 6   • Suvorexant (BELSOMRA) 10 MG tablet Take 10 mg by mouth Every Night. 90 tablet 0   • thiamine (VITAMIN B-1) 100 MG tablet Take 100 mg by mouth Daily.       No facility-administered medications prior to visit.      New Medications Ordered This Visit   Medications   • escitalopram (LEXAPRO) 10 MG tablet     Sig: Take 1 tablet by mouth Daily.     Dispense:  30 tablet     Refill:  5     [unfilled]  Medications Discontinued During This Encounter   Medication Reason   • Doxepin HCl 3 MG tablet *Therapy completed   • B Complex Vitamins (VITAMIN B COMPLEX PO) *Therapy completed   • Suvorexant (BELSOMRA) 10 MG tablet *Therapy completed   • thiamine (VITAMIN B-1) 100 MG tablet *Therapy completed         Return in about 6 weeks (around 11/1/2018).

## 2018-10-08 DIAGNOSIS — F41.1 GENERALIZED ANXIETY DISORDER: ICD-10-CM

## 2018-10-08 RX ORDER — LORAZEPAM 1 MG/1
1 TABLET ORAL DAILY PRN
Qty: 90 TABLET | Refills: 1 | Status: SHIPPED | OUTPATIENT
Start: 2018-10-08 | End: 2019-01-02 | Stop reason: SDUPTHER

## 2018-10-22 ENCOUNTER — OFFICE VISIT (OUTPATIENT)
Dept: GASTROENTEROLOGY | Facility: CLINIC | Age: 83
End: 2018-10-22

## 2018-10-22 VITALS
BODY MASS INDEX: 19.63 KG/M2 | HEIGHT: 64 IN | DIASTOLIC BLOOD PRESSURE: 75 MMHG | SYSTOLIC BLOOD PRESSURE: 112 MMHG | WEIGHT: 115 LBS | HEART RATE: 79 BPM

## 2018-10-22 DIAGNOSIS — K31.A0 INTESTINAL METAPLASIA OF GASTRIC CARDIA: Primary | ICD-10-CM

## 2018-10-22 DIAGNOSIS — K58.0 IRRITABLE BOWEL SYNDROME WITH DIARRHEA: ICD-10-CM

## 2018-10-22 PROCEDURE — 99213 OFFICE O/P EST LOW 20 MIN: CPT | Performed by: INTERNAL MEDICINE

## 2018-10-22 NOTE — PROGRESS NOTES
Subjective   Brittny Fay is a 83 y.o.. female is here today for follow-up.    Chief Complaint   Patient presents with   • Heartburn     Hx Barretts/GERD   • Diarrhea     6 BMs per day     History of Present Illness  Patient underwent EGD back in April and was found to have very short segment Abreu's esophagus.  She experiences multiple well formed stools daily.  Imodium tends to take care of it completely.  She reports no dysphagia and her overall sense of well-being is good.    The following portions of the patient's history were reviewed and updated as appropriate: allergies, current medications, past family history, past medical history, past social history, past surgical history and problem list.      Current Outpatient Prescriptions:   •  amLODIPine (NORVASC) 5 MG tablet, Take 1 tablet by mouth Daily., Disp: 90 tablet, Rfl: 3  •  ascorbic acid (VITAMIN C) 100 MG tablet, Take 100 mg by mouth., Disp: , Rfl:   •  calcium carbonate (OS-JOSIANE) 600 MG tablet, Take 600 mg by mouth 2 (Two) Times a Day With Meals., Disp: , Rfl:   •  cholecalciferol (VITAMIN D3) 1000 units tablet, Take 1,000 Units by mouth Daily., Disp: , Rfl:   •  escitalopram (LEXAPRO) 10 MG tablet, Take 1 tablet by mouth Daily., Disp: 30 tablet, Rfl: 5  •  esomeprazole (nexIUM) 40 MG capsule, Take 1 capsule by mouth Daily., Disp: 90 capsule, Rfl: 3  •  fluticasone (FLONASE) 50 MCG/ACT nasal spray, 2 sprays into each nostril Daily., Disp: 1 bottle, Rfl: 2  •  LORazepam (ATIVAN) 1 MG tablet, Take 1 tablet by mouth Daily As Needed for Anxiety., Disp: 90 tablet, Rfl: 1  •  Multiple Vitamins-Minerals (MULTIPLE VITAMINS/WOMENS PO), Take  by mouth., Disp: , Rfl:   •  Probiotic Product (PROBIOTIC PO), Take  by mouth., Disp: , Rfl:   •  vitamin B-12 (CYANOCOBALAMIN) 500 MCG tablet, Take 500 mcg by mouth Daily., Disp: , Rfl:   •  Wheat Dextrin (BENEFIBER PO), Take  by mouth., Disp: , Rfl:     Family History   Problem Relation Age of Onset   • Cancer Mother          Breast cancer   • Hypertension Father    • Stroke Father    • Hypertension Brother    • Cancer Maternal Grandmother         breast       Review of Systems   Respiratory: Negative for shortness of breath.    Cardiovascular: Negative for chest pain.   All other systems reviewed and are negative.      Objective   Physical Exam   Constitutional: She appears well-developed and well-nourished.   Nursing note and vitals reviewed.      Pertinent laboratory results were reviewed. , Pertinent old records were reviewed.  and Pertinent medical tests were reviewed.     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  She is having multiple well formed stools a day consistent with irritable bowel syndrome.  I am recommending pediatric Imodium where she can titrate the dose as low as a half of milligram at a time.  She may return for follow-up at any time.

## 2018-10-31 ENCOUNTER — OFFICE VISIT (OUTPATIENT)
Dept: INTERNAL MEDICINE | Facility: CLINIC | Age: 83
End: 2018-10-31

## 2018-10-31 VITALS
RESPIRATION RATE: 16 BRPM | TEMPERATURE: 98.4 F | WEIGHT: 119 LBS | HEIGHT: 64 IN | SYSTOLIC BLOOD PRESSURE: 110 MMHG | OXYGEN SATURATION: 98 % | HEART RATE: 73 BPM | BODY MASS INDEX: 20.32 KG/M2 | DIASTOLIC BLOOD PRESSURE: 60 MMHG

## 2018-10-31 DIAGNOSIS — E55.9 HYPOVITAMINOSIS D: ICD-10-CM

## 2018-10-31 DIAGNOSIS — K21.9 GASTROESOPHAGEAL REFLUX DISEASE, ESOPHAGITIS PRESENCE NOT SPECIFIED: ICD-10-CM

## 2018-10-31 DIAGNOSIS — R73.01 ELEVATED FASTING GLUCOSE: ICD-10-CM

## 2018-10-31 DIAGNOSIS — F41.8 DEPRESSION WITH ANXIETY: Primary | ICD-10-CM

## 2018-10-31 DIAGNOSIS — I10 ESSENTIAL HYPERTENSION: ICD-10-CM

## 2018-10-31 PROCEDURE — 99213 OFFICE O/P EST LOW 20 MIN: CPT | Performed by: FAMILY MEDICINE

## 2018-10-31 RX ORDER — MAGNESIUM CHLORIDE 64 MG
64 TABLET, DELAYED RELEASE (ENTERIC COATED) ORAL DAILY
COMMUNITY
End: 2020-08-12

## 2018-10-31 NOTE — PROGRESS NOTES
Brittny Fay is a 83 y.o. female, who presents with a chief complaint of   Chief Complaint   Patient presents with   • Depression       HPI     Depression.  She started escitalopram 6 weeks ago.  She is feeling much better.  No longer weepy and overwhelmed.  Handling her family stress better.  Tolerating the medication okay.    The following portions of the patient's history were reviewed and updated as appropriate: allergies, current medications, past family history, past medical history, past social history, past surgical history and problem list.    Allergies: Amitriptyline; Bupropion; and Pneumococcal vaccines    Review of Systems   Constitutional: Negative.    Neurological: Negative.    Hematological: Negative.    Psychiatric/Behavioral: Positive for sleep disturbance. Negative for dysphoric mood and suicidal ideas.             Wt Readings from Last 3 Encounters:   10/31/18 54 kg (119 lb)   10/22/18 52.2 kg (115 lb)   09/20/18 53.5 kg (118 lb)     Temp Readings from Last 3 Encounters:   10/31/18 98.4 °F (36.9 °C)   09/20/18 97.4 °F (36.3 °C)   08/13/18 97.8 °F (36.6 °C)     BP Readings from Last 3 Encounters:   10/31/18 110/60   10/22/18 112/75   09/20/18 110/58     Pulse Readings from Last 3 Encounters:   10/31/18 73   10/22/18 79   09/20/18 68     Body mass index is 20.43 kg/m².  @LASTSAO2(3)@    Physical Exam   Constitutional: She is oriented to person, place, and time. She appears well-developed and well-nourished.   HENT:   Head: Normocephalic.   Mouth/Throat: Oropharynx is clear and moist.   Eyes: Conjunctivae and EOM are normal.   Neck: Neck supple. No thyromegaly present.   Cardiovascular: Normal rate, regular rhythm and normal heart sounds.    Pulmonary/Chest: Effort normal and breath sounds normal.   Abdominal: Soft. Bowel sounds are normal. There is no hepatosplenomegaly.   Musculoskeletal: Normal range of motion. She exhibits no edema.   Neurological: She is alert and oriented to person,  place, and time.   Skin: Skin is warm and dry.   Psychiatric: She has a normal mood and affect. Her behavior is normal.   Nursing note and vitals reviewed.      Results for orders placed or performed in visit on 05/08/18   Comprehensive Metabolic Panel   Result Value Ref Range    Glucose 88 65 - 99 mg/dL    BUN 16 8 - 23 mg/dL    Creatinine 0.74 0.57 - 1.00 mg/dL    eGFR Non African Am 75 >60 mL/min/1.73    eGFR African Am 91 >60 mL/min/1.73    BUN/Creatinine Ratio 21.6 7.0 - 25.0    Sodium 143 136 - 145 mmol/L    Potassium 4.1 3.5 - 5.2 mmol/L    Chloride 101 98 - 107 mmol/L    Total CO2 30.0 (H) 22.0 - 29.0 mmol/L    Calcium 9.2 8.8 - 10.5 mg/dL    Total Protein 6.5 6.0 - 8.5 g/dL    Albumin 4.30 3.50 - 5.20 g/dL    Globulin 2.2 gm/dL    A/G Ratio 2.0 g/dL    Total Bilirubin 0.5 0.2 - 1.2 mg/dL    Alkaline Phosphatase 81 40 - 129 U/L    AST (SGOT) 17 5 - 32 U/L    ALT (SGPT) 10 5 - 33 U/L   Hemoglobin A1c   Result Value Ref Range    Hemoglobin A1C 5.80 (H) 4.80 - 5.60 %   Lipid Panel With / Chol / HDL Ratio   Result Value Ref Range    Total Cholesterol 149 0 - 200 mg/dL    Triglycerides 76 0 - 150 mg/dL    HDL Cholesterol 53 40 - 60 mg/dL    VLDL Cholesterol 15.2 7 - 27 mg/dL    LDL Cholesterol  81 0 - 100 mg/dL    Chol/HDL Ratio 2.81    Vitamin B12   Result Value Ref Range    Vitamin B-12 1,994 (H) 232-1,245 pg/mL   Vitamin D 25 Hydroxy   Result Value Ref Range    25 Hydroxy, Vitamin D >60.0 ng/ml   TSH   Result Value Ref Range    TSH 1.410 0.270 - 4.200 mIU/mL   Magnesium   Result Value Ref Range    Magnesium 1.9 1.7 - 2.5 mg/dL   CBC & Differential   Result Value Ref Range    WBC 5.45 4.80 - 10.80 10*3/mm3    RBC 4.84 4.20 - 5.40 10*6/mm3    Hemoglobin 13.7 12.0 - 16.0 g/dL    Hematocrit 43.3 37.0 - 47.0 %    MCV 89.5 81.0 - 99.0 fL    MCH 28.3 27.0 - 31.0 pg    MCHC 31.6 31.0 - 37.0 g/dL    RDW 14.1 11.5 - 14.5 %    Platelets 241 140 - 500 10*3/mm3    Neutrophil Rel % 49.4 45.0 - 70.0 %    Lymphocyte Rel %  37.1 20.0 - 45.0 %    Monocyte Rel % 9.7 (H) 3.0 - 8.0 %    Eosinophil Rel % 2.9 0.0 - 4.0 %    Basophil Rel % 0.7 0.0 - 2.0 %    Neutrophils Absolute 2.69 1.50 - 8.30 10*3/mm3    Lymphocytes Absolute 2.02 0.60 - 4.80 10*3/mm3    Monocytes Absolute 0.53 0.00 - 1.00 10*3/mm3    Eosinophils Absolute 0.16 0.10 - 0.30 10*3/mm3    Basophils Absolute 0.04 0.00 - 0.20 10*3/mm3    Immature Granulocyte Rel % 0.2 0.0 - 0.5 %    Immature Grans Absolute 0.01 0.00 - 0.03 10*3/mm3    nRBC 0.0 0.0 - 0.0 /100 WBC           Brittny was seen today for depression.    Diagnoses and all orders for this visit:    Depression with anxiety      Controlled.  Continue Lexapro for at least 9 months.    Outpatient Medications Prior to Visit   Medication Sig Dispense Refill   • amLODIPine (NORVASC) 5 MG tablet Take 1 tablet by mouth Daily. 90 tablet 3   • ascorbic acid (VITAMIN C) 100 MG tablet Take 100 mg by mouth.     • calcium carbonate (OS-JOSIANE) 600 MG tablet Take 600 mg by mouth 2 (Two) Times a Day With Meals.     • cholecalciferol (VITAMIN D3) 1000 units tablet Take 1,000 Units by mouth Daily.     • escitalopram (LEXAPRO) 10 MG tablet Take 1 tablet by mouth Daily. 30 tablet 5   • esomeprazole (nexIUM) 40 MG capsule Take 1 capsule by mouth Daily. 90 capsule 3   • fluticasone (FLONASE) 50 MCG/ACT nasal spray 2 sprays into each nostril Daily. 1 bottle 2   • LORazepam (ATIVAN) 1 MG tablet Take 1 tablet by mouth Daily As Needed for Anxiety. 90 tablet 1   • Multiple Vitamins-Minerals (MULTIPLE VITAMINS/WOMENS PO) Take  by mouth.     • Probiotic Product (PROBIOTIC PO) Take  by mouth.     • vitamin B-12 (CYANOCOBALAMIN) 500 MCG tablet Take 500 mcg by mouth Daily.     • Wheat Dextrin (BENEFIBER PO) Take  by mouth.       No facility-administered medications prior to visit.      No orders of the defined types were placed in this encounter.    [unfilled]  There are no discontinued medications.      Return in about 4 months (around 2/28/2019).

## 2018-11-05 ENCOUNTER — RESULTS ENCOUNTER (OUTPATIENT)
Dept: INTERNAL MEDICINE | Facility: CLINIC | Age: 83
End: 2018-11-05

## 2018-11-05 DIAGNOSIS — K21.9 GASTROESOPHAGEAL REFLUX DISEASE, ESOPHAGITIS PRESENCE NOT SPECIFIED: ICD-10-CM

## 2018-11-05 DIAGNOSIS — E55.9 HYPOVITAMINOSIS D: ICD-10-CM

## 2018-11-05 DIAGNOSIS — F41.8 DEPRESSION WITH ANXIETY: ICD-10-CM

## 2018-11-05 DIAGNOSIS — I10 ESSENTIAL HYPERTENSION: ICD-10-CM

## 2018-11-05 DIAGNOSIS — R73.01 ELEVATED FASTING GLUCOSE: ICD-10-CM

## 2019-01-02 DIAGNOSIS — F41.1 GENERALIZED ANXIETY DISORDER: ICD-10-CM

## 2019-01-02 DIAGNOSIS — F41.8 DEPRESSION WITH ANXIETY: ICD-10-CM

## 2019-01-02 NOTE — TELEPHONE ENCOUNTER
Pt needs refill on Ativan and Lexapro. Pt wants to know if you can increase the lexapro to 20mg. She feels it not working at the 10mg.

## 2019-01-04 RX ORDER — LORAZEPAM 1 MG/1
1 TABLET ORAL DAILY PRN
Qty: 90 TABLET | Refills: 1 | Status: SHIPPED | OUTPATIENT
Start: 2019-01-04 | End: 2019-07-18 | Stop reason: SDUPTHER

## 2019-01-04 RX ORDER — ESCITALOPRAM OXALATE 10 MG/1
10 TABLET ORAL DAILY
Qty: 30 TABLET | Refills: 5 | Status: SHIPPED | OUTPATIENT
Start: 2019-01-04 | End: 2019-06-26 | Stop reason: SDUPTHER

## 2019-01-07 RX ORDER — RANITIDINE 300 MG/1
TABLET ORAL
Qty: 90 TABLET | Refills: 3 | Status: SHIPPED | OUTPATIENT
Start: 2019-01-07 | End: 2019-05-29

## 2019-02-12 ENCOUNTER — RESULTS ENCOUNTER (OUTPATIENT)
Dept: INTERNAL MEDICINE | Facility: CLINIC | Age: 84
End: 2019-02-12

## 2019-02-12 DIAGNOSIS — K21.9 GASTROESOPHAGEAL REFLUX DISEASE WITHOUT ESOPHAGITIS: ICD-10-CM

## 2019-02-12 DIAGNOSIS — I10 BENIGN ESSENTIAL HYPERTENSION: ICD-10-CM

## 2019-02-12 DIAGNOSIS — R73.01 ELEVATED FASTING GLUCOSE: ICD-10-CM

## 2019-02-18 LAB
ALBUMIN SERPL-MCNC: 4.4 G/DL (ref 3.5–5.2)
ALBUMIN/GLOB SERPL: 2 G/DL
ALP SERPL-CCNC: 75 U/L (ref 40–129)
ALT SERPL-CCNC: 11 U/L (ref 5–33)
AST SERPL-CCNC: 16 U/L (ref 5–32)
BASOPHILS # BLD AUTO: 0.03 10*3/MM3 (ref 0–0.2)
BASOPHILS NFR BLD AUTO: 0.5 % (ref 0–1.5)
BILIRUB SERPL-MCNC: 0.5 MG/DL (ref 0.2–1.2)
BUN SERPL-MCNC: 16 MG/DL (ref 8–23)
BUN/CREAT SERPL: 20.5 (ref 7–25)
CALCIUM SERPL-MCNC: 8.9 MG/DL (ref 8.8–10.5)
CHLORIDE SERPL-SCNC: 103 MMOL/L (ref 98–107)
CHOLEST SERPL-MCNC: 161 MG/DL (ref 0–200)
CHOLEST/HDLC SERPL: 2.78 {RATIO}
CO2 SERPL-SCNC: 31.5 MMOL/L (ref 22–29)
CREAT SERPL-MCNC: 0.78 MG/DL (ref 0.57–1)
EOSINOPHIL # BLD AUTO: 0.24 10*3/MM3 (ref 0–0.4)
EOSINOPHIL NFR BLD AUTO: 4 % (ref 0.3–6.2)
ERYTHROCYTE [DISTWIDTH] IN BLOOD BY AUTOMATED COUNT: 14.2 % (ref 12.3–15.4)
GLOBULIN SER CALC-MCNC: 2.2 GM/DL
GLUCOSE SERPL-MCNC: 86 MG/DL (ref 65–99)
HBA1C MFR BLD: 5.5 % (ref 4.8–5.6)
HCT VFR BLD AUTO: 43.2 % (ref 34–46.6)
HDLC SERPL-MCNC: 58 MG/DL (ref 40–60)
HGB BLD-MCNC: 13.6 G/DL (ref 12–15.9)
IMM GRANULOCYTES # BLD AUTO: 0.02 10*3/MM3 (ref 0–0.05)
IMM GRANULOCYTES NFR BLD AUTO: 0.3 % (ref 0–0.5)
LDLC SERPL CALC-MCNC: 88 MG/DL (ref 0–100)
LYMPHOCYTES # BLD AUTO: 1.85 10*3/MM3 (ref 0.7–3.1)
LYMPHOCYTES NFR BLD AUTO: 30.7 % (ref 19.6–45.3)
MCH RBC QN AUTO: 28.7 PG (ref 26.6–33)
MCHC RBC AUTO-ENTMCNC: 31.5 G/DL (ref 31.5–35.7)
MCV RBC AUTO: 91.1 FL (ref 79–97)
MONOCYTES # BLD AUTO: 0.57 10*3/MM3 (ref 0.1–0.9)
MONOCYTES NFR BLD AUTO: 9.5 % (ref 5–12)
NEUTROPHILS # BLD AUTO: 3.31 10*3/MM3 (ref 1.4–7)
NEUTROPHILS NFR BLD AUTO: 55 % (ref 42.7–76)
NRBC BLD AUTO-RTO: 0 /100 WBC (ref 0–0)
PLATELET # BLD AUTO: 209 10*3/MM3 (ref 140–450)
POTASSIUM SERPL-SCNC: 4 MMOL/L (ref 3.5–5.2)
PROT SERPL-MCNC: 6.6 G/DL (ref 6–8.5)
RBC # BLD AUTO: 4.74 10*6/MM3 (ref 3.77–5.28)
SODIUM SERPL-SCNC: 144 MMOL/L (ref 136–145)
TRIGL SERPL-MCNC: 77 MG/DL (ref 0–150)
TSH SERPL DL<=0.005 MIU/L-ACNC: 1.72 MIU/ML (ref 0.27–4.2)
VIT B12 SERPL-MCNC: 1368 PG/ML
VLDLC SERPL CALC-MCNC: 15.4 MG/DL (ref 7–27)
WBC # BLD AUTO: 6.02 10*3/MM3 (ref 3.4–10.8)

## 2019-02-25 ENCOUNTER — OFFICE VISIT (OUTPATIENT)
Dept: INTERNAL MEDICINE | Facility: CLINIC | Age: 84
End: 2019-02-25

## 2019-02-25 ENCOUNTER — HOSPITAL ENCOUNTER (OUTPATIENT)
Dept: GENERAL RADIOLOGY | Facility: HOSPITAL | Age: 84
Discharge: HOME | End: 2019-02-25
Admitting: FAMILY MEDICINE

## 2019-02-25 VITALS
RESPIRATION RATE: 16 BRPM | HEIGHT: 64 IN | BODY MASS INDEX: 20.14 KG/M2 | SYSTOLIC BLOOD PRESSURE: 120 MMHG | WEIGHT: 118 LBS | DIASTOLIC BLOOD PRESSURE: 70 MMHG | OXYGEN SATURATION: 98 % | HEART RATE: 76 BPM | TEMPERATURE: 97.9 F

## 2019-02-25 DIAGNOSIS — G47.00 INSOMNIA, UNSPECIFIED TYPE: ICD-10-CM

## 2019-02-25 DIAGNOSIS — M25.531 RIGHT WRIST PAIN: ICD-10-CM

## 2019-02-25 DIAGNOSIS — K22.719 BARRETT'S ESOPHAGUS WITH DYSPLASIA: ICD-10-CM

## 2019-02-25 DIAGNOSIS — N81.10 BLADDER PROLAPSE, FEMALE, ACQUIRED: ICD-10-CM

## 2019-02-25 DIAGNOSIS — R73.01 ELEVATED FASTING GLUCOSE: ICD-10-CM

## 2019-02-25 DIAGNOSIS — I10 BENIGN ESSENTIAL HYPERTENSION: Primary | ICD-10-CM

## 2019-02-25 DIAGNOSIS — K21.00 GASTROESOPHAGEAL REFLUX DISEASE WITH ESOPHAGITIS: ICD-10-CM

## 2019-02-25 DIAGNOSIS — M85.88 OSTEOPENIA OF SPINE: ICD-10-CM

## 2019-02-25 DIAGNOSIS — F41.8 DEPRESSION WITH ANXIETY: ICD-10-CM

## 2019-02-25 DIAGNOSIS — J30.2 SEASONAL ALLERGIC RHINITIS, UNSPECIFIED TRIGGER: ICD-10-CM

## 2019-02-25 PROCEDURE — 73110 X-RAY EXAM OF WRIST: CPT

## 2019-02-25 PROCEDURE — 99214 OFFICE O/P EST MOD 30 MIN: CPT | Performed by: FAMILY MEDICINE

## 2019-02-25 NOTE — PROGRESS NOTES
Subjective     Brittny Fay is a 84 y.o. female, who presents with a chief complaint of   Chief Complaint   Patient presents with   • Hypertension     Hypertension     Heartburn     1. HTN.  Still tolerating amlodipine.  Denies chest pain, dizziness.  Home blood pressures running around 120s/70s.    2. GERD/Abreu's.  She alternates esomeprazole. with ranitidine.  Concerned about long term PPI use.    3. Right wrist pain.  X months.  Base of thumb.  Sometimes there is swelling; no redness.  Continual.  Worse at night.    The following portions of the patient's history were reviewed and updated as appropriate: allergies, current medications, past family history, past medical history, past social history, past surgical history and problem list.    Allergies: Amitriptyline; Bupropion; and Pneumococcal vaccines    Review of Systems   Constitutional: Negative.    HENT: Negative.    Eyes: Negative.    Respiratory: Negative.    Cardiovascular: Negative.    Gastrointestinal: Negative.    Endocrine: Negative.    Genitourinary: Negative.    Musculoskeletal: Positive for arthralgias.   Skin: Negative.    Allergic/Immunologic: Positive for environmental allergies.   Neurological: Negative.    Psychiatric/Behavioral: Positive for sleep disturbance.       Objective     Wt Readings from Last 3 Encounters:   02/25/19 53.5 kg (118 lb)   10/31/18 54 kg (119 lb)   10/22/18 52.2 kg (115 lb)     Temp Readings from Last 3 Encounters:   02/25/19 97.9 °F (36.6 °C)   10/31/18 98.4 °F (36.9 °C)   09/20/18 97.4 °F (36.3 °C)     BP Readings from Last 3 Encounters:   02/25/19 120/70   10/31/18 110/60   10/22/18 112/75     Pulse Readings from Last 3 Encounters:   02/25/19 76   10/31/18 73   10/22/18 79     Body mass index is 20.25 kg/m².  SpO2 Readings from Last 3 Encounters:   02/12/18 98%   09/25/17 97%   08/09/17 98%       Physical Exam   Constitutional: She is oriented to person, place, and time. She appears well-developed and  well-nourished.   HENT:   Head: Normocephalic and atraumatic.   Mouth/Throat: Oropharynx is clear and moist.   Eyes: Conjunctivae and EOM are normal.   Neck: Neck supple. No thyromegaly present.   Cardiovascular: Normal rate, regular rhythm and normal heart sounds.   Pulmonary/Chest: Effort normal and breath sounds normal.   Abdominal: Soft. Bowel sounds are normal. There is no hepatosplenomegaly.   Musculoskeletal: Normal range of motion. She exhibits no edema.        Right wrist: She exhibits tenderness (base of thumb). She exhibits normal range of motion, no swelling and no deformity.   Neurological: She is alert and oriented to person, place, and time.   Skin: Skin is warm and dry. No rash noted.   Psychiatric: She has a normal mood and affect. Her behavior is normal.   Nursing note and vitals reviewed.      Results for orders placed or performed in visit on 02/12/19   Comprehensive Metabolic Panel   Result Value Ref Range    Glucose 86 65 - 99 mg/dL    BUN 16 8 - 23 mg/dL    Creatinine 0.78 0.57 - 1.00 mg/dL    eGFR Non African Am 71 >60 mL/min/1.73    eGFR African Am 86 >60 mL/min/1.73    BUN/Creatinine Ratio 20.5 7.0 - 25.0    Sodium 144 136 - 145 mmol/L    Potassium 4.0 3.5 - 5.2 mmol/L    Chloride 103 98 - 107 mmol/L    Total CO2 31.5 (H) 22.0 - 29.0 mmol/L    Calcium 8.9 8.8 - 10.5 mg/dL    Total Protein 6.6 6.0 - 8.5 g/dL    Albumin 4.40 3.50 - 5.20 g/dL    Globulin 2.2 gm/dL    A/G Ratio 2.0 g/dL    Total Bilirubin 0.5 0.2 - 1.2 mg/dL    Alkaline Phosphatase 75 40 - 129 U/L    AST (SGOT) 16 5 - 32 U/L    ALT (SGPT) 11 5 - 33 U/L   Hemoglobin A1c   Result Value Ref Range    Hemoglobin A1C 5.50 4.80 - 5.60 %   Lipid Panel With / Chol / HDL Ratio   Result Value Ref Range    Total Cholesterol 161 0 - 200 mg/dL    Triglycerides 77 0 - 150 mg/dL    HDL Cholesterol 58 40 - 60 mg/dL    VLDL Cholesterol 15.4 7 - 27 mg/dL    LDL Cholesterol  88 0 - 100 mg/dL    Chol/HDL Ratio 2.78    TSH   Result Value Ref Range     TSH 1.720 0.270 - 4.200 mIU/mL   Vitamin B12   Result Value Ref Range    Vitamin B-12 1,368 (H) 232-1,245 pg/mL   CBC & Differential   Result Value Ref Range    WBC 6.02 3.40 - 10.80 10*3/mm3    RBC 4.74 3.77 - 5.28 10*6/mm3    Hemoglobin 13.6 12.0 - 15.9 g/dL    Hematocrit 43.2 34.0 - 46.6 %    MCV 91.1 79.0 - 97.0 fL    MCH 28.7 26.6 - 33.0 pg    MCHC 31.5 31.5 - 35.7 g/dL    RDW 14.2 12.3 - 15.4 %    Platelets 209 140 - 450 10*3/mm3    Neutrophil Rel % 55.0 42.7 - 76.0 %    Lymphocyte Rel % 30.7 19.6 - 45.3 %    Monocyte Rel % 9.5 5.0 - 12.0 %    Eosinophil Rel % 4.0 0.3 - 6.2 %    Basophil Rel % 0.5 0.0 - 1.5 %    Neutrophils Absolute 3.31 1.40 - 7.00 10*3/mm3    Lymphocytes Absolute 1.85 0.70 - 3.10 10*3/mm3    Monocytes Absolute 0.57 0.10 - 0.90 10*3/mm3    Eosinophils Absolute 0.24 0.00 - 0.40 10*3/mm3    Basophils Absolute 0.03 0.00 - 0.20 10*3/mm3    Immature Granulocyte Rel % 0.3 0.0 - 0.5 %    Immature Grans Absolute 0.02 0.00 - 0.05 10*3/mm3    nRBC 0.0 0.0 - 0.0 /100 WBC       Assessment/Plan   Brittny was seen today for heartburn and hypertension.    Diagnoses and all orders for this visit:    Benign essential hypertension  -     Comprehensive Metabolic Panel; Future  -     Lipid Panel With / Chol / HDL Ratio; Future  -     TSH; Future    Seasonal allergic rhinitis, unspecified allergic rhinitis trigger    Gastroesophageal reflux disease without esophagitis  -     CBC & Differential; Future    Overactive bladder    Osteopenia    Insomnia, unspecified type    Elevated fasting glucose  -     Hemoglobin A1c; Future  -     Vitamin B12; Future    Renal cyst, left    Right wrist pain    Depression with anxiety    1. HTN.  Controlled with amlodipine 5 mg.  Labs reviewed.    2. TWIN.  Controlled with nasal steroid.    3. GERD/Barretts.  Continue esomeprazole/ranitidine.  She sees Dr. Weber.    4. OAB/bladder prolapse.  Improved after surgical repair.    5. Osteopenia.  Continue calcium and vitamin D, weight  bearing exercise.  Next dexa scan due now.    6. Insomnia.  Occasionally takes lorazepam.  Med management agreement and Carlos UTD.    7. Elevated fasting glucose.  Improved.  A1c 5.5.  Continue lifestyle measures.    8. Left renal cyst.  Read as simple this last time on u/s.    9. Routine health maint.  Offered annual wellness exam and patient declined.  UTD on vaccines.  Mammogram UTD.    10. Right wrist pain.  Suspect OA base of thumb.  Check xray.    11. Depression with anxiety.  Controlled with escitalopram.    Outpatient Medications Prior to Visit   Medication Sig Dispense Refill   • amLODIPine (NORVASC) 5 MG tablet Take 1 tablet by mouth Daily. 90 tablet 3   • ascorbic acid (VITAMIN C) 100 MG tablet Take 100 mg by mouth.     • calcium carbonate (OS-JOSIANE) 600 MG tablet Take 600 mg by mouth 2 (Two) Times a Day With Meals.     • cholecalciferol (VITAMIN D3) 1000 units tablet Take 1,000 Units by mouth Daily.     • escitalopram (LEXAPRO) 10 MG tablet Take 1 tablet by mouth Daily. 30 tablet 5   • esomeprazole (nexIUM) 40 MG capsule Take 1 capsule by mouth Daily. 90 capsule 3   • fluticasone (FLONASE) 50 MCG/ACT nasal spray 2 sprays into each nostril Daily. 1 bottle 2   • LORazepam (ATIVAN) 1 MG tablet Take 1 tablet by mouth Daily As Needed for Anxiety. 90 tablet 1   • magnesium chloride ER 64 MG DR tablet Take  by mouth Daily.     • Multiple Vitamins-Minerals (MULTIPLE VITAMINS/WOMENS PO) Take  by mouth.     • Probiotic Product (PROBIOTIC PO) Take  by mouth.     • raNITIdine (ZANTAC) 300 MG tablet TAKE 1 TABLET DAILY 90 tablet 3   • vitamin B-12 (CYANOCOBALAMIN) 500 MCG tablet Take 500 mcg by mouth Daily.     • Wheat Dextrin (BENEFIBER PO) Take  by mouth.       No facility-administered medications prior to visit.      No orders of the defined types were placed in this encounter.    [unfilled]  There are no discontinued medications.    Return in about 6 months (around 8/25/2019).

## 2019-03-04 ENCOUNTER — TELEPHONE (OUTPATIENT)
Dept: INTERNAL MEDICINE | Facility: CLINIC | Age: 84
End: 2019-03-04

## 2019-03-04 NOTE — TELEPHONE ENCOUNTER
"Patient is aware    ----- Message from Remedios Sorto MD sent at 2/25/2019  4:01 PM EST -----  There is no arthritis at the base of the thumb so I think this is a tenosynovitis.  Ibuprofen for a few days plus a \"thumb spica\" wrist splint for a couple of weeks may be helpful.  We'll consider hand specialist consult if needed.    "

## 2019-03-08 ENCOUNTER — HOSPITAL ENCOUNTER (OUTPATIENT)
Dept: BONE DENSITY | Facility: HOSPITAL | Age: 84
Discharge: HOME | End: 2019-03-08
Admitting: FAMILY MEDICINE

## 2019-03-08 DIAGNOSIS — M85.88 OSTEOPENIA OF SPINE: ICD-10-CM

## 2019-03-08 PROCEDURE — 77080 DXA BONE DENSITY AXIAL: CPT

## 2019-03-12 ENCOUNTER — OFFICE VISIT (OUTPATIENT)
Dept: GASTROENTEROLOGY | Facility: CLINIC | Age: 84
End: 2019-03-12

## 2019-03-12 VITALS
TEMPERATURE: 97.5 F | BODY MASS INDEX: 20.35 KG/M2 | WEIGHT: 119.2 LBS | DIASTOLIC BLOOD PRESSURE: 70 MMHG | SYSTOLIC BLOOD PRESSURE: 116 MMHG | HEIGHT: 64 IN

## 2019-03-12 DIAGNOSIS — K21.00 GASTROESOPHAGEAL REFLUX DISEASE WITH ESOPHAGITIS: Primary | ICD-10-CM

## 2019-03-12 PROCEDURE — 99213 OFFICE O/P EST LOW 20 MIN: CPT | Performed by: INTERNAL MEDICINE

## 2019-03-12 NOTE — PROGRESS NOTES
Chief Complaint   Patient presents with   • GI Problem     Barretts Esophagus      Brittny Fay is a 84 y.o. female who presents with a history of reflux esophagitis with intestinal metaplasia on biopsies  HPI     Patient 84-year-old female with history of diverticulitis, IBS and hypertension here to discuss Abreu's esophagus.  Patient denies abdominal pain no nausea vomiting has had episode of reflux related chest pain but only once since taking Nexium.  Patient otherwise been doing well decided to come in to discuss Abreu's esophagus and therapy.  Patient unaware that she had less than a centimeter of abnormal mucosa or the risk of having the short segment and what it means.  Patient denies dysphasia though does not have a mild Schatzki's ring noted.  Patient with no chest pain or palpitations no weight loss no hematemesis or melena noted.  Patient here for further discussion.    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   • Bladder prolapse, female, acquired    • Chest pain    • Chronic interstitial cystitis    • Degeneration of cervical intervertebral disc    • Depression    • Diverticulitis 2008   • Diverticulosis of colon    • Hiatal hernia    • Hypertension    • IBS (irritable bowel syndrome)    • Insomnia    • Obstructive sleep apnea    • Osteoarthritis    • Reflux esophagitis    • Solitary thyroid nodule        Current Outpatient Medications:   •  amLODIPine (NORVASC) 5 MG tablet, Take 1 tablet by mouth Daily., Disp: 90 tablet, Rfl: 3  •  ascorbic acid (VITAMIN C) 100 MG tablet, Take 100 mg by mouth., Disp: , Rfl:   •  calcium carbonate (OS-JOSIANE) 600 MG tablet, Take 600 mg by mouth 2 (Two) Times a Day With Meals., Disp: , Rfl:   •  cholecalciferol (VITAMIN D3) 1000 units tablet, Take 1,000 Units by mouth Daily., Disp: , Rfl:   •  escitalopram (LEXAPRO) 10 MG tablet, Take 1 tablet by mouth Daily., Disp: 30 tablet, Rfl:  5  •  esomeprazole (nexIUM) 40 MG capsule, Take 1 capsule by mouth Daily., Disp: 90 capsule, Rfl: 3  •  fluticasone (FLONASE) 50 MCG/ACT nasal spray, 2 sprays into each nostril Daily., Disp: 1 bottle, Rfl: 2  •  LORazepam (ATIVAN) 1 MG tablet, Take 1 tablet by mouth Daily As Needed for Anxiety., Disp: 90 tablet, Rfl: 1  •  magnesium chloride ER 64 MG DR tablet, Take  by mouth Daily., Disp: , Rfl:   •  Multiple Vitamins-Minerals (MULTIPLE VITAMINS/WOMENS PO), Take  by mouth., Disp: , Rfl:   •  Probiotic Product (PROBIOTIC PO), Take  by mouth., Disp: , Rfl:   •  raNITIdine (ZANTAC) 300 MG tablet, TAKE 1 TABLET DAILY, Disp: 90 tablet, Rfl: 3  •  vitamin B-12 (CYANOCOBALAMIN) 500 MCG tablet, Take 500 mcg by mouth Daily., Disp: , Rfl:   •  Wheat Dextrin (BENEFIBER PO), Take  by mouth., Disp: , Rfl:   Allergies   Allergen Reactions   • Amitriptyline      Other reaction(s): alertness   • Bupropion      Other reaction(s): insomnia   • Pneumococcal Vaccines      Other reaction(s): Dizziness     Social History     Socioeconomic History   • Marital status:      Spouse name: Not on file   • Number of children: Not on file   • Years of education: Not on file   • Highest education level: Not on file   Social Needs   • Financial resource strain: Not on file   • Food insecurity - worry: Not on file   • Food insecurity - inability: Not on file   • Transportation needs - medical: Not on file   • Transportation needs - non-medical: Not on file   Occupational History   • Not on file   Tobacco Use   • Smoking status: Former Smoker     Packs/day: 1.50     Years: 20.00     Pack years: 30.00     Types: Cigarettes   • Smokeless tobacco: Never Used   • Tobacco comment: quit x 30 years   Substance and Sexual Activity   • Alcohol use: No     Frequency: Never   • Drug use: No   • Sexual activity: No   Other Topics Concern   • Not on file   Social History Narrative   • Not on file     Family History   Problem Relation Age of Onset   •  Cancer Mother         Breast cancer   • Hypertension Father    • Stroke Father    • Hypertension Brother    • Cancer Maternal Grandmother         breast     Review of Systems   Constitutional: Negative.    HENT: Negative.    Eyes: Negative.    Respiratory: Negative.    Cardiovascular: Negative.    Gastrointestinal: Negative.    Endocrine: Negative.    Musculoskeletal: Negative.    Skin: Negative.    Allergic/Immunologic: Negative.    Hematological: Negative.      Vitals:    03/12/19 1018   BP: 116/70   Temp: 97.5 °F (36.4 °C)     Physical Exam   Constitutional: She is oriented to person, place, and time. She appears well-developed and well-nourished.   HENT:   Head: Normocephalic and atraumatic.   Eyes: Pupils are equal, round, and reactive to light. No scleral icterus.   Neck: Normal range of motion.   Cardiovascular: Normal rate, regular rhythm and normal heart sounds.   Pulmonary/Chest: Effort normal and breath sounds normal. She has no wheezes. She has no rales.   Abdominal: Soft. Bowel sounds are normal. She exhibits no shifting dullness, no distension, no pulsatile liver, no fluid wave, no abdominal bruit, no ascites, no pulsatile midline mass and no mass. There is no hepatosplenomegaly. There is no tenderness. There is no rigidity and no guarding. No hernia.   Musculoskeletal: Normal range of motion. She exhibits no edema.   Lymphadenopathy:     She has no cervical adenopathy.   Neurological: She is alert and oriented to person, place, and time. No cranial nerve deficit.   Skin: Skin is warm and dry. No rash noted.   Psychiatric: She has a normal mood and affect. Her behavior is normal.   Nursing note and vitals reviewed.    Diagnoses and all orders for this visit:    Gastroesophageal reflux disease with esophagitis    Patient 84-year-old female with history of reflux esophagitis with less than a centimeter of abnormal tissue presenting for discussion on Abreu's esophagus.  Biopsies at the time of Abreu's  diagnosis was of the GE junction which of course includes gastric tissue where intestinal metaplasia is not uncommon.  No significant amount of acid reflux injury was noted.  Review of imaging shows less than 5 mm of abnormal mucosa inconsistent with true Abreu's.  Patient concerned with long-term use of PPIs but discussed risk is actually very low in this population.  If patient interested may try alternating Zantac with Nexium as Nexium does seem to help her symptomatically.  Patient can follow-up as needed but no indication for radiofrequency ablation this patient.

## 2019-05-29 ENCOUNTER — OFFICE VISIT (OUTPATIENT)
Dept: GASTROENTEROLOGY | Facility: CLINIC | Age: 84
End: 2019-05-29

## 2019-05-29 VITALS
BODY MASS INDEX: 20.08 KG/M2 | HEIGHT: 64 IN | TEMPERATURE: 98.3 F | SYSTOLIC BLOOD PRESSURE: 134 MMHG | WEIGHT: 117.6 LBS | DIASTOLIC BLOOD PRESSURE: 82 MMHG

## 2019-05-29 DIAGNOSIS — R10.33 PERIUMBILICAL ABDOMINAL PAIN: Primary | ICD-10-CM

## 2019-05-29 PROCEDURE — 99214 OFFICE O/P EST MOD 30 MIN: CPT | Performed by: NURSE PRACTITIONER

## 2019-05-29 RX ORDER — ESOMEPRAZOLE MAGNESIUM 40 MG/1
40 CAPSULE, DELAYED RELEASE ORAL DAILY
Qty: 90 CAPSULE | Refills: 3 | Status: SHIPPED | OUTPATIENT
Start: 2019-05-29 | End: 2020-05-15 | Stop reason: SDUPTHER

## 2019-05-29 NOTE — PROGRESS NOTES
Chief Complaint   Patient presents with   • Abdominal Pain   • Diarrhea     HPI    Brittny Fay is a  84 y.o. female here for a follow up visit for abdominal pain and diarrhea.  This is a former patient of Dr. Weber's, follows with Dr. Young, to me.  Patient has a history of diverticulitis, irritable bowel syndrome, and hypertension.  She was last seen in the office in March of this year by Dr. Young to discuss Abreu's esophagus.  Prior endoscopic evaluation revealed less than a centimeter abnormal tissue consistent with Abreu's.  Radiofrequency ablation was not recommended.    Patient was then admitted to at [facility] on 5/22/2019 for complaints of abdominal pain.  CT showed enteritis of the small bowel.  Patient was started on IV fluids and Flagyl.  Stool studies were negative.  Likely viral gastroenteritis.    CT impression as follows:    CONCLUSION: 1. Abnormal small bowel pattern most compatible with nonspecific enteritis (infection/inflammatory or ischemic) with low-grade functional obstruction distal small bowel. 2. Stable chronic and postoperative changes of the abdomen and pelvis as discussed.    Labs dated 5/22/2019 with normal kidney and liver function.  No evidence of anemia.  Normal lipase.    On visit today patient reports return of symptoms last night with episodes of vomiting and periumbilical pain described as a sensation that comes and goes followed by multiple bowel movements with formed stool.  Denies rectal bleeding.  Vomiting has resolved.  She had one bowel movement today.  Still with persistent periumbilical pain that comes and goes.  Denies nausea at this moment.    Patient reports issues with small bowel blockage several years ago resolved with IV fluid, bowel rest, and Flagyl.    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   • Bladder prolapse, female, acquired    • Chest pain    • Chronic  interstitial cystitis    • Degeneration of cervical intervertebral disc    • Depression    • Diverticulitis 2008   • Diverticulosis of colon    • Hiatal hernia    • Hypertension    • IBS (irritable bowel syndrome)    • Insomnia    • Obstructive sleep apnea    • Osteoarthritis    • Reflux esophagitis    • Solitary thyroid nodule        Past Surgical History:   Procedure Laterality Date   • CHOLECYSTECTOMY     • COLONOSCOPY  04/29/2014    Darren Lancaster MD   • ENDOSCOPY N/A 4/2/2018    Procedure: ESOPHAGOGASTRODUODENOSCOPY WITH COLD BIOPSIES;  Surgeon: Josiah Weber MD;  Location: [facility] ENDOSCOPY;  Service: Gastroenterology   • EYE SURGERY      Cataract surgery bilateral   • OOPHORECTOMY Right    • OTHER SURGICAL HISTORY      Laparoscopy Partial Colectomy Sigmoid   • PROCTOPEXY W/ SIGMOID RESECTION     • UMBILICAL HERNIA REPAIR  2015   • UPPER GASTROINTESTINAL ENDOSCOPY  04/29/2014    Darren Lancaster MD   • VAGINAL PROLAPSE REPAIR      ALL PELVIC ORGAN PROLAPSE SURGERY       Scheduled Meds:  Outpatient Encounter Medications as of 5/29/2019   Medication Sig Dispense Refill   • amLODIPine (NORVASC) 5 MG tablet Take 1 tablet by mouth Daily. 90 tablet 3   • ascorbic acid (VITAMIN C) 100 MG tablet Take 100 mg by mouth.     • calcium carbonate (OS-JOSIANE) 600 MG tablet Take 600 mg by mouth 2 (Two) Times a Day With Meals.     • cholecalciferol (VITAMIN D3) 1000 units tablet Take 1,000 Units by mouth Daily.     • escitalopram (LEXAPRO) 10 MG tablet Take 1 tablet by mouth Daily. 30 tablet 5   • esomeprazole (nexIUM) 40 MG capsule Take 1 capsule by mouth Daily. 90 capsule 3   • fluticasone (FLONASE) 50 MCG/ACT nasal spray 2 sprays into each nostril Daily. 1 bottle 2   • LORazepam (ATIVAN) 1 MG tablet Take 1 tablet by mouth Daily As Needed for Anxiety. 90 tablet 1   • magnesium chloride ER 64 MG DR tablet Take  by mouth Daily.     • Multiple Vitamins-Minerals (MULTIPLE VITAMINS/WOMENS PO) Take  by mouth.     • Probiotic Product  (PROBIOTIC PO) Take  by mouth.     • vitamin B-12 (CYANOCOBALAMIN) 500 MCG tablet Take 500 mcg by mouth Daily.     • Wheat Dextrin (BENEFIBER PO) Take  by mouth.     • [DISCONTINUED] esomeprazole (nexIUM) 40 MG capsule Take 1 capsule by mouth Daily. 90 capsule 3   • [DISCONTINUED] raNITIdine (ZANTAC) 300 MG tablet TAKE 1 TABLET DAILY 90 tablet 3     No facility-administered encounter medications on file as of 5/29/2019.        Continuous Infusions:  No current facility-administered medications for this visit.     PRN Meds:.    Allergies   Allergen Reactions   • Amitriptyline      Other reaction(s): alertness   • Bupropion      Other reaction(s): insomnia   • Pneumococcal Vaccines      Other reaction(s): Dizziness       Social History     Socioeconomic History   • Marital status:      Spouse name: Not on file   • Number of children: Not on file   • Years of education: Not on file   • Highest education level: Not on file   Tobacco Use   • Smoking status: Former Smoker     Packs/day: 1.50     Years: 20.00     Pack years: 30.00     Types: Cigarettes   • Smokeless tobacco: Never Used   • Tobacco comment: quit x 30 years   Substance and Sexual Activity   • Alcohol use: No     Frequency: Never   • Drug use: No   • Sexual activity: No       Family History   Problem Relation Age of Onset   • Cancer Mother         Breast cancer   • Hypertension Father    • Stroke Father    • Hypertension Brother    • Cancer Maternal Grandmother         breast       Review of Systems   Constitutional: Negative for activity change, appetite change, fatigue, fever and unexpected weight change.   HENT: Negative for trouble swallowing.    Respiratory: Negative for apnea, cough, choking, chest tightness, shortness of breath and wheezing.    Cardiovascular: Negative for chest pain, palpitations and leg swelling.   Gastrointestinal: Positive for abdominal pain and vomiting. Negative for abdominal distention, anal bleeding, blood in stool,  constipation, diarrhea, nausea and rectal pain.       Vitals:    05/29/19 1422   BP: 134/82   Temp: 98.3 °F (36.8 °C)       Physical Exam   Constitutional: She is oriented to person, place, and time. She appears well-developed and well-nourished.   Eyes: Pupils are equal, round, and reactive to light.   Cardiovascular: Normal rate, regular rhythm and normal heart sounds.   Pulmonary/Chest: Effort normal and breath sounds normal. No respiratory distress. She has no wheezes.   Abdominal: Soft. Bowel sounds are normal. She exhibits no distension and no mass. There is tenderness. There is no guarding. No hernia.   Musculoskeletal: Normal range of motion.   Neurological: She is alert and oriented to person, place, and time.   Skin: Skin is warm and dry. Capillary refill takes less than 2 seconds.   Psychiatric: She has a normal mood and affect. Her behavior is normal.       No images are attached to the encounter.    Brittny was seen today for abdominal pain and diarrhea.    Diagnoses and all orders for this visit:    Periumbilical abdominal pain  Comments:  Will obtain Doppler study of the mesentery  Orders:  -     FL small bowel follow through; Future    Other orders  -     esomeprazole (nexIUM) 40 MG capsule; Take 1 capsule by mouth Daily.      Impression:    This is a pleasant 84-year-old female who follows with Dr. Young seen today for follow-up from recent ER visit with recurrence of abdominal pain with associated vomiting and increase in bowel movements.  I reviewed all hospital records.  At this point we need to further examine the patient's small bowel with a small bowel follow-through and obtain Doppler study of the mesentery. (I did discuss patient issues with Dr. Niño as Dr. Young was out of the office). I instructed the patient to adhere to a bland diet.  If symptoms worsen she needs to go to the emergency room preferably [facility].  I did refill her Nexium.  Return to office 3 weeks.

## 2019-05-30 ENCOUNTER — HOSPITAL ENCOUNTER (INPATIENT)
Facility: HOSPITAL | Age: 84
LOS: 10 days | Discharge: NURSING, REHAB OR LONG-TERM CARE | End: 2019-06-09
Attending: EMERGENCY MEDICINE | Admitting: SURGERY

## 2019-05-30 ENCOUNTER — APPOINTMENT (OUTPATIENT)
Dept: CT IMAGING | Facility: HOSPITAL | Age: 84
End: 2019-05-30

## 2019-05-30 ENCOUNTER — HOSPITAL ENCOUNTER (OUTPATIENT)
Dept: GENERAL RADIOLOGY | Facility: HOSPITAL | Age: 84
Discharge: HOME | End: 2019-05-30
Admitting: NURSE PRACTITIONER

## 2019-05-30 DIAGNOSIS — R26.2 DIFFICULTY WALKING: ICD-10-CM

## 2019-05-30 DIAGNOSIS — R10.33 PERIUMBILICAL ABDOMINAL PAIN: Primary | ICD-10-CM

## 2019-05-30 DIAGNOSIS — R10.33 PERIUMBILICAL ABDOMINAL PAIN: ICD-10-CM

## 2019-05-30 DIAGNOSIS — K56.609 SBO (SMALL BOWEL OBSTRUCTION) (HCC): Primary | ICD-10-CM

## 2019-05-30 LAB
ALBUMIN SERPL-MCNC: 5.1 G/DL (ref 3.5–5.2)
ALBUMIN/GLOB SERPL: 1.6 G/DL
ALP SERPL-CCNC: 78 U/L (ref 39–117)
ALT SERPL W P-5'-P-CCNC: 27 U/L (ref 1–33)
ANION GAP SERPL CALCULATED.3IONS-SCNC: 18.5 MMOL/L
AST SERPL-CCNC: 30 U/L (ref 1–32)
BACTERIA UR QL AUTO: NORMAL /HPF
BASOPHILS # BLD AUTO: 0.03 10*3/MM3 (ref 0–0.2)
BASOPHILS NFR BLD AUTO: 0.2 % (ref 0–1.5)
BILIRUB SERPL-MCNC: 0.9 MG/DL (ref 0.2–1.2)
BILIRUB UR QL STRIP: NEGATIVE
BUN BLD-MCNC: 29 MG/DL (ref 8–23)
BUN/CREAT SERPL: 25.7 (ref 7–25)
CALCIUM SPEC-SCNC: 10.5 MG/DL (ref 8.6–10.5)
CHLORIDE SERPL-SCNC: 89 MMOL/L (ref 98–107)
CLARITY UR: CLEAR
CO2 SERPL-SCNC: 32.5 MMOL/L (ref 22–29)
COLOR UR: YELLOW
CREAT BLD-MCNC: 1.13 MG/DL (ref 0.57–1)
DEPRECATED RDW RBC AUTO: 43.7 FL (ref 37–54)
EOSINOPHIL # BLD AUTO: 0 10*3/MM3 (ref 0–0.4)
EOSINOPHIL NFR BLD AUTO: 0 % (ref 0.3–6.2)
ERYTHROCYTE [DISTWIDTH] IN BLOOD BY AUTOMATED COUNT: 13.5 % (ref 12.3–15.4)
GFR SERPL CREATININE-BSD FRML MDRD: 46 ML/MIN/1.73
GLOBULIN UR ELPH-MCNC: 3.2 GM/DL
GLUCOSE BLD-MCNC: 161 MG/DL (ref 65–99)
GLUCOSE UR STRIP-MCNC: NEGATIVE MG/DL
HCT VFR BLD AUTO: 49.7 % (ref 34–46.6)
HGB BLD-MCNC: 15.7 G/DL (ref 12–15.9)
HGB UR QL STRIP.AUTO: NEGATIVE
HYALINE CASTS UR QL AUTO: NORMAL /LPF
IMM GRANULOCYTES # BLD AUTO: 0.06 10*3/MM3 (ref 0–0.05)
IMM GRANULOCYTES NFR BLD AUTO: 0.4 % (ref 0–0.5)
KETONES UR QL STRIP: ABNORMAL
LEUKOCYTE ESTERASE UR QL STRIP.AUTO: NEGATIVE
LIPASE SERPL-CCNC: 11 U/L (ref 13–60)
LYMPHOCYTES # BLD AUTO: 1.01 10*3/MM3 (ref 0.7–3.1)
LYMPHOCYTES NFR BLD AUTO: 7.5 % (ref 19.6–45.3)
MCH RBC QN AUTO: 28 PG (ref 26.6–33)
MCHC RBC AUTO-ENTMCNC: 31.6 G/DL (ref 31.5–35.7)
MCV RBC AUTO: 88.8 FL (ref 79–97)
MONOCYTES # BLD AUTO: 1.18 10*3/MM3 (ref 0.1–0.9)
MONOCYTES NFR BLD AUTO: 8.8 % (ref 5–12)
NEUTROPHILS # BLD AUTO: 11.1 10*3/MM3 (ref 1.7–7)
NEUTROPHILS NFR BLD AUTO: 83.1 % (ref 42.7–76)
NITRITE UR QL STRIP: NEGATIVE
NRBC BLD AUTO-RTO: 0 /100 WBC (ref 0–0.2)
PH UR STRIP.AUTO: 6 [PH] (ref 5–8)
PLATELET # BLD AUTO: 299 10*3/MM3 (ref 140–450)
PMV BLD AUTO: 10.2 FL (ref 6–12)
POTASSIUM BLD-SCNC: 3.4 MMOL/L (ref 3.5–5.2)
PROT SERPL-MCNC: 8.3 G/DL (ref 6–8.5)
PROT UR QL STRIP: ABNORMAL
RBC # BLD AUTO: 5.6 10*6/MM3 (ref 3.77–5.28)
RBC # UR: NORMAL /HPF
REF LAB TEST METHOD: NORMAL
SODIUM BLD-SCNC: 140 MMOL/L (ref 136–145)
SP GR UR STRIP: >=1.03 (ref 1–1.03)
SQUAMOUS #/AREA URNS HPF: NORMAL /HPF
UROBILINOGEN UR QL STRIP: ABNORMAL
WBC NRBC COR # BLD: 13.38 10*3/MM3 (ref 3.4–10.8)
WBC UR QL AUTO: NORMAL /HPF

## 2019-05-30 PROCEDURE — 80053 COMPREHEN METABOLIC PANEL: CPT | Performed by: EMERGENCY MEDICINE

## 2019-05-30 PROCEDURE — 25010000002 HYDROMORPHONE PER 4 MG: Performed by: SURGERY

## 2019-05-30 PROCEDURE — 83690 ASSAY OF LIPASE: CPT | Performed by: EMERGENCY MEDICINE

## 2019-05-30 PROCEDURE — 85025 COMPLETE CBC W/AUTO DIFF WBC: CPT | Performed by: EMERGENCY MEDICINE

## 2019-05-30 PROCEDURE — 25010000002 ONDANSETRON PER 1 MG: Performed by: SURGERY

## 2019-05-30 PROCEDURE — 25010000002 ONDANSETRON PER 1 MG: Performed by: EMERGENCY MEDICINE

## 2019-05-30 PROCEDURE — 25010000002 MORPHINE PER 10 MG: Performed by: EMERGENCY MEDICINE

## 2019-05-30 PROCEDURE — 74177 CT ABD & PELVIS W/CONTRAST: CPT

## 2019-05-30 PROCEDURE — 74018 RADEX ABDOMEN 1 VIEW: CPT

## 2019-05-30 PROCEDURE — 25010000002 MORPHINE PER 10 MG: Performed by: SURGERY

## 2019-05-30 PROCEDURE — 99284 EMERGENCY DEPT VISIT MOD MDM: CPT

## 2019-05-30 PROCEDURE — 81001 URINALYSIS AUTO W/SCOPE: CPT | Performed by: EMERGENCY MEDICINE

## 2019-05-30 PROCEDURE — 99222 1ST HOSP IP/OBS MODERATE 55: CPT | Performed by: SURGERY

## 2019-05-30 PROCEDURE — 25010000002 IOPAMIDOL 61 % SOLUTION: Performed by: EMERGENCY MEDICINE

## 2019-05-30 RX ORDER — SODIUM CHLORIDE 0.9 % (FLUSH) 0.9 %
10 SYRINGE (ML) INJECTION AS NEEDED
Status: DISCONTINUED | OUTPATIENT
Start: 2019-05-30 | End: 2019-06-09 | Stop reason: HOSPADM

## 2019-05-30 RX ORDER — FAMOTIDINE 10 MG/ML
20 INJECTION, SOLUTION INTRAVENOUS EVERY 12 HOURS SCHEDULED
Status: DISCONTINUED | OUTPATIENT
Start: 2019-05-30 | End: 2019-06-01

## 2019-05-30 RX ORDER — DEXTROSE, SODIUM CHLORIDE, AND POTASSIUM CHLORIDE 5; .45; .15 G/100ML; G/100ML; G/100ML
60 INJECTION INTRAVENOUS CONTINUOUS
Status: DISCONTINUED | OUTPATIENT
Start: 2019-05-30 | End: 2019-06-04

## 2019-05-30 RX ORDER — ONDANSETRON 2 MG/ML
4 INJECTION INTRAMUSCULAR; INTRAVENOUS EVERY 6 HOURS PRN
Status: DISCONTINUED | OUTPATIENT
Start: 2019-05-30 | End: 2019-06-09 | Stop reason: HOSPADM

## 2019-05-30 RX ORDER — HYDROMORPHONE HYDROCHLORIDE 1 MG/ML
0.5 INJECTION, SOLUTION INTRAMUSCULAR; INTRAVENOUS; SUBCUTANEOUS
Status: DISCONTINUED | OUTPATIENT
Start: 2019-05-30 | End: 2019-06-08

## 2019-05-30 RX ORDER — MORPHINE SULFATE 2 MG/ML
4 INJECTION, SOLUTION INTRAMUSCULAR; INTRAVENOUS ONCE
Status: COMPLETED | OUTPATIENT
Start: 2019-05-30 | End: 2019-05-30

## 2019-05-30 RX ORDER — ONDANSETRON 2 MG/ML
4 INJECTION INTRAMUSCULAR; INTRAVENOUS ONCE
Status: COMPLETED | OUTPATIENT
Start: 2019-05-30 | End: 2019-05-30

## 2019-05-30 RX ORDER — MORPHINE SULFATE 2 MG/ML
2 INJECTION, SOLUTION INTRAMUSCULAR; INTRAVENOUS
Status: DISCONTINUED | OUTPATIENT
Start: 2019-05-30 | End: 2019-06-08

## 2019-05-30 RX ORDER — ZINC OXIDE 13 %
1 CREAM (GRAM) TOPICAL DAILY
COMMUNITY
End: 2023-03-07

## 2019-05-30 RX ADMIN — FAMOTIDINE 20 MG: 10 INJECTION INTRAVENOUS at 20:02

## 2019-05-30 RX ADMIN — MORPHINE SULFATE 2 MG: 2 INJECTION, SOLUTION INTRAMUSCULAR; INTRAVENOUS at 21:51

## 2019-05-30 RX ADMIN — ONDANSETRON HYDROCHLORIDE 4 MG: 2 SOLUTION INTRAMUSCULAR; INTRAVENOUS at 20:06

## 2019-05-30 RX ADMIN — POTASSIUM CHLORIDE, DEXTROSE MONOHYDRATE AND SODIUM CHLORIDE 100 ML/HR: 150; 5; 450 INJECTION, SOLUTION INTRAVENOUS at 16:30

## 2019-05-30 RX ADMIN — IOPAMIDOL 85 ML: 612 INJECTION, SOLUTION INTRAVENOUS at 13:11

## 2019-05-30 RX ADMIN — MORPHINE SULFATE 2 MG: 2 INJECTION, SOLUTION INTRAMUSCULAR; INTRAVENOUS at 23:50

## 2019-05-30 RX ADMIN — ONDANSETRON HYDROCHLORIDE 4 MG: 2 SOLUTION INTRAMUSCULAR; INTRAVENOUS at 12:16

## 2019-05-30 RX ADMIN — HYDROMORPHONE HYDROCHLORIDE 0.5 MG: 1 INJECTION, SOLUTION INTRAMUSCULAR; INTRAVENOUS; SUBCUTANEOUS at 20:00

## 2019-05-30 RX ADMIN — SODIUM CHLORIDE 1000 ML: 9 INJECTION, SOLUTION INTRAVENOUS at 12:15

## 2019-05-30 RX ADMIN — HYDROMORPHONE HYDROCHLORIDE 0.5 MG: 1 INJECTION, SOLUTION INTRAMUSCULAR; INTRAVENOUS; SUBCUTANEOUS at 16:29

## 2019-05-30 RX ADMIN — MORPHINE SULFATE 4 MG: 2 INJECTION, SOLUTION INTRAMUSCULAR; INTRAVENOUS at 12:18

## 2019-05-31 ENCOUNTER — ANESTHESIA (OUTPATIENT)
Dept: PERIOP | Facility: HOSPITAL | Age: 84
End: 2019-05-31

## 2019-05-31 ENCOUNTER — TELEPHONE (OUTPATIENT)
Dept: SURGERY | Facility: CLINIC | Age: 84
End: 2019-05-31

## 2019-05-31 ENCOUNTER — ANESTHESIA EVENT (OUTPATIENT)
Dept: PERIOP | Facility: HOSPITAL | Age: 84
End: 2019-05-31

## 2019-05-31 LAB
ABO GROUP BLD: NORMAL
ALBUMIN SERPL-MCNC: 2.7 G/DL (ref 3.5–5.2)
ALBUMIN/GLOB SERPL: 2.3 G/DL
ALP SERPL-CCNC: 27 U/L (ref 39–117)
ALT SERPL W P-5'-P-CCNC: 31 U/L (ref 1–33)
ANION GAP SERPL CALCULATED.3IONS-SCNC: 17.3 MMOL/L
ANION GAP SERPL CALCULATED.3IONS-SCNC: 9.9 MMOL/L
AST SERPL-CCNC: 27 U/L (ref 1–32)
BASOPHILS # BLD AUTO: 0.03 10*3/MM3 (ref 0–0.2)
BASOPHILS # BLD MANUAL: 0.04 10*3/MM3 (ref 0–0.2)
BASOPHILS NFR BLD AUTO: 0.2 % (ref 0–1.5)
BASOPHILS NFR BLD AUTO: 1 % (ref 0–1.5)
BILIRUB SERPL-MCNC: 0.7 MG/DL (ref 0.2–1.2)
BLD GP AB SCN SERPL QL: NEGATIVE
BUN BLD-MCNC: 30 MG/DL (ref 8–23)
BUN BLD-MCNC: 33 MG/DL (ref 8–23)
BUN/CREAT SERPL: 28 (ref 7–25)
BUN/CREAT SERPL: 30 (ref 7–25)
BURR CELLS BLD QL SMEAR: ABNORMAL
CALCIUM SPEC-SCNC: 7.6 MG/DL (ref 8.6–10.5)
CALCIUM SPEC-SCNC: 8.3 MG/DL (ref 8.6–10.5)
CHLORIDE SERPL-SCNC: 105 MMOL/L (ref 98–107)
CHLORIDE SERPL-SCNC: 97 MMOL/L (ref 98–107)
CO2 SERPL-SCNC: 23.1 MMOL/L (ref 22–29)
CO2 SERPL-SCNC: 23.7 MMOL/L (ref 22–29)
CREAT BLD-MCNC: 1 MG/DL (ref 0.57–1)
CREAT BLD-MCNC: 1.18 MG/DL (ref 0.57–1)
D-LACTATE SERPL-SCNC: 2.7 MMOL/L (ref 0.5–2)
DEPRECATED RDW RBC AUTO: 44.4 FL (ref 37–54)
DEPRECATED RDW RBC AUTO: 45.7 FL (ref 37–54)
DEPRECATED RDW RBC AUTO: 46.5 FL (ref 37–54)
EOSINOPHIL # BLD AUTO: 0 10*3/MM3 (ref 0–0.4)
EOSINOPHIL NFR BLD AUTO: 0 % (ref 0.3–6.2)
ERYTHROCYTE [DISTWIDTH] IN BLOOD BY AUTOMATED COUNT: 13.4 % (ref 12.3–15.4)
ERYTHROCYTE [DISTWIDTH] IN BLOOD BY AUTOMATED COUNT: 13.6 % (ref 12.3–15.4)
ERYTHROCYTE [DISTWIDTH] IN BLOOD BY AUTOMATED COUNT: 13.7 % (ref 12.3–15.4)
GFR SERPL CREATININE-BSD FRML MDRD: 44 ML/MIN/1.73
GFR SERPL CREATININE-BSD FRML MDRD: 53 ML/MIN/1.73
GLOBULIN UR ELPH-MCNC: 1.2 GM/DL
GLUCOSE BLD-MCNC: 119 MG/DL (ref 65–99)
GLUCOSE BLD-MCNC: 259 MG/DL (ref 65–99)
GLUCOSE BLDC GLUCOMTR-MCNC: 138 MG/DL (ref 70–130)
GLUCOSE BLDC GLUCOMTR-MCNC: 73 MG/DL (ref 70–130)
HCT VFR BLD AUTO: 26.8 % (ref 34–46.6)
HCT VFR BLD AUTO: 28.4 % (ref 34–46.6)
HCT VFR BLD AUTO: 46.4 % (ref 34–46.6)
HGB BLD-MCNC: 14.3 G/DL (ref 12–15.9)
HGB BLD-MCNC: 8.4 G/DL (ref 12–15.9)
HGB BLD-MCNC: 8.8 G/DL (ref 12–15.9)
IMM GRANULOCYTES # BLD AUTO: 0.06 10*3/MM3 (ref 0–0.05)
IMM GRANULOCYTES NFR BLD AUTO: 0.4 % (ref 0–0.5)
INR PPP: 1.47 (ref 0.9–1.1)
LYMPHOCYTES # BLD AUTO: 0.97 10*3/MM3 (ref 0.7–3.1)
LYMPHOCYTES # BLD MANUAL: 2.62 10*3/MM3 (ref 0.7–3.1)
LYMPHOCYTES NFR BLD AUTO: 5.9 % (ref 19.6–45.3)
LYMPHOCYTES NFR BLD MANUAL: 4 % (ref 5–12)
LYMPHOCYTES NFR BLD MANUAL: 65.7 % (ref 19.6–45.3)
MCH RBC QN AUTO: 28.3 PG (ref 26.6–33)
MCH RBC QN AUTO: 28.5 PG (ref 26.6–33)
MCH RBC QN AUTO: 28.7 PG (ref 26.6–33)
MCHC RBC AUTO-ENTMCNC: 30.8 G/DL (ref 31.5–35.7)
MCHC RBC AUTO-ENTMCNC: 31 G/DL (ref 31.5–35.7)
MCHC RBC AUTO-ENTMCNC: 31.3 G/DL (ref 31.5–35.7)
MCV RBC AUTO: 90.8 FL (ref 79–97)
MCV RBC AUTO: 91.7 FL (ref 79–97)
MCV RBC AUTO: 92.5 FL (ref 79–97)
MONOCYTES # BLD AUTO: 0.16 10*3/MM3 (ref 0.1–0.9)
MONOCYTES # BLD AUTO: 1.43 10*3/MM3 (ref 0.1–0.9)
MONOCYTES NFR BLD AUTO: 8.7 % (ref 5–12)
NEUTROPHILS # BLD AUTO: 1.17 10*3/MM3 (ref 1.7–7)
NEUTROPHILS # BLD AUTO: 13.9 10*3/MM3 (ref 1.7–7)
NEUTROPHILS NFR BLD AUTO: 84.8 % (ref 42.7–76)
NEUTROPHILS NFR BLD MANUAL: 29.3 % (ref 42.7–76)
NRBC BLD AUTO-RTO: 0 /100 WBC (ref 0–0.2)
PLAT MORPH BLD: NORMAL
PLATELET # BLD AUTO: 141 10*3/MM3 (ref 140–450)
PLATELET # BLD AUTO: 187 10*3/MM3 (ref 140–450)
PLATELET # BLD AUTO: 327 10*3/MM3 (ref 140–450)
PMV BLD AUTO: 10.4 FL (ref 6–12)
PMV BLD AUTO: 10.6 FL (ref 6–12)
PMV BLD AUTO: 10.7 FL (ref 6–12)
POIKILOCYTOSIS BLD QL SMEAR: ABNORMAL
POTASSIUM BLD-SCNC: 3.2 MMOL/L (ref 3.5–5.2)
POTASSIUM BLD-SCNC: 4 MMOL/L (ref 3.5–5.2)
PROT SERPL-MCNC: 3.9 G/DL (ref 6–8.5)
PROTHROMBIN TIME: 17.5 SECONDS (ref 11.7–14.2)
RBC # BLD AUTO: 2.95 10*6/MM3 (ref 3.77–5.28)
RBC # BLD AUTO: 3.07 10*6/MM3 (ref 3.77–5.28)
RBC # BLD AUTO: 5.06 10*6/MM3 (ref 3.77–5.28)
RH BLD: POSITIVE
SODIUM BLD-SCNC: 138 MMOL/L (ref 136–145)
SODIUM BLD-SCNC: 138 MMOL/L (ref 136–145)
T&S EXPIRATION DATE: NORMAL
WBC MORPH BLD: NORMAL
WBC NRBC COR # BLD: 16.39 10*3/MM3 (ref 3.4–10.8)
WBC NRBC COR # BLD: 3.05 10*3/MM3 (ref 3.4–10.8)
WBC NRBC COR # BLD: 3.99 10*3/MM3 (ref 3.4–10.8)

## 2019-05-31 PROCEDURE — 25010000002 FENTANYL CITRATE (PF) 100 MCG/2ML SOLUTION: Performed by: ANESTHESIOLOGY

## 2019-05-31 PROCEDURE — P9041 ALBUMIN (HUMAN),5%, 50ML: HCPCS | Performed by: ANESTHESIOLOGY

## 2019-05-31 PROCEDURE — 25010000002 ALBUMIN HUMAN 5% PER 50 ML: Performed by: NURSE ANESTHETIST, CERTIFIED REGISTERED

## 2019-05-31 PROCEDURE — P9041 ALBUMIN (HUMAN),5%, 50ML: HCPCS | Performed by: NURSE ANESTHETIST, CERTIFIED REGISTERED

## 2019-05-31 PROCEDURE — 88307 TISSUE EXAM BY PATHOLOGIST: CPT | Performed by: SURGERY

## 2019-05-31 PROCEDURE — 86901 BLOOD TYPING SEROLOGIC RH(D): CPT | Performed by: ANESTHESIOLOGY

## 2019-05-31 PROCEDURE — 80053 COMPREHEN METABOLIC PANEL: CPT | Performed by: SURGERY

## 2019-05-31 PROCEDURE — 85025 COMPLETE CBC W/AUTO DIFF WBC: CPT | Performed by: INTERNAL MEDICINE

## 2019-05-31 PROCEDURE — 25010000002 LORAZEPAM PER 2 MG: Performed by: SURGERY

## 2019-05-31 PROCEDURE — 94799 UNLISTED PULMONARY SVC/PX: CPT

## 2019-05-31 PROCEDURE — 86923 COMPATIBILITY TEST ELECTRIC: CPT

## 2019-05-31 PROCEDURE — 25010000003 BUPIVACAINE LIPOSOME 1.3 % SUSPENSION: Performed by: SURGERY

## 2019-05-31 PROCEDURE — 86900 BLOOD TYPING SEROLOGIC ABO: CPT

## 2019-05-31 PROCEDURE — 86900 BLOOD TYPING SEROLOGIC ABO: CPT | Performed by: ANESTHESIOLOGY

## 2019-05-31 PROCEDURE — 86901 BLOOD TYPING SEROLOGIC RH(D): CPT

## 2019-05-31 PROCEDURE — 25010000002 HYDROMORPHONE PER 4 MG: Performed by: SURGERY

## 2019-05-31 PROCEDURE — 85018 HEMOGLOBIN: CPT

## 2019-05-31 PROCEDURE — 25010000002 SUCCINYLCHOLINE PER 20 MG: Performed by: NURSE ANESTHETIST, CERTIFIED REGISTERED

## 2019-05-31 PROCEDURE — 82947 ASSAY GLUCOSE BLOOD QUANT: CPT

## 2019-05-31 PROCEDURE — 25010000002 ALBUMIN HUMAN 5% PER 50 ML: Performed by: ANESTHESIOLOGY

## 2019-05-31 PROCEDURE — 85014 HEMATOCRIT: CPT

## 2019-05-31 PROCEDURE — 86850 RBC ANTIBODY SCREEN: CPT | Performed by: ANESTHESIOLOGY

## 2019-05-31 PROCEDURE — 44120 REMOVAL OF SMALL INTESTINE: CPT | Performed by: SURGERY

## 2019-05-31 PROCEDURE — C9290 INJ, BUPIVACAINE LIPOSOME: HCPCS | Performed by: SURGERY

## 2019-05-31 PROCEDURE — 94002 VENT MGMT INPAT INIT DAY: CPT

## 2019-05-31 PROCEDURE — 25010000002 PHENYLEPHRINE PER 1 ML: Performed by: NURSE ANESTHETIST, CERTIFIED REGISTERED

## 2019-05-31 PROCEDURE — 85007 BL SMEAR W/DIFF WBC COUNT: CPT | Performed by: INTERNAL MEDICINE

## 2019-05-31 PROCEDURE — 83605 ASSAY OF LACTIC ACID: CPT | Performed by: INTERNAL MEDICINE

## 2019-05-31 PROCEDURE — 82803 BLOOD GASES ANY COMBINATION: CPT

## 2019-05-31 PROCEDURE — 44120 REMOVAL OF SMALL INTESTINE: CPT | Performed by: PHYSICIAN ASSISTANT

## 2019-05-31 PROCEDURE — 25010000002 HYDROMORPHONE PER 4 MG: Performed by: ANESTHESIOLOGY

## 2019-05-31 PROCEDURE — 85025 COMPLETE CBC W/AUTO DIFF WBC: CPT | Performed by: SURGERY

## 2019-05-31 PROCEDURE — 87040 BLOOD CULTURE FOR BACTERIA: CPT | Performed by: INTERNAL MEDICINE

## 2019-05-31 PROCEDURE — 25010000002 MORPHINE PER 10 MG: Performed by: SURGERY

## 2019-05-31 PROCEDURE — 25010000002 PHENYLEPHRINE 10 MG/ML SOLUTION 5 ML VIAL: Performed by: INTERNAL MEDICINE

## 2019-05-31 PROCEDURE — 0DBA0ZZ EXCISION OF JEJUNUM, OPEN APPROACH: ICD-10-PCS | Performed by: SURGERY

## 2019-05-31 PROCEDURE — 99231 SBSQ HOSP IP/OBS SF/LOW 25: CPT | Performed by: SURGERY

## 2019-05-31 PROCEDURE — 25010000002 ONDANSETRON PER 1 MG: Performed by: SURGERY

## 2019-05-31 PROCEDURE — 36415 COLL VENOUS BLD VENIPUNCTURE: CPT | Performed by: SURGERY

## 2019-05-31 PROCEDURE — 25010000002 PIPERACILLIN SOD-TAZOBACTAM PER 1 G: Performed by: SURGERY

## 2019-05-31 PROCEDURE — 85027 COMPLETE CBC AUTOMATED: CPT | Performed by: SURGERY

## 2019-05-31 PROCEDURE — 82962 GLUCOSE BLOOD TEST: CPT

## 2019-05-31 PROCEDURE — 88304 TISSUE EXAM BY PATHOLOGIST: CPT | Performed by: SURGERY

## 2019-05-31 PROCEDURE — 85610 PROTHROMBIN TIME: CPT | Performed by: INTERNAL MEDICINE

## 2019-05-31 PROCEDURE — 0DBB0ZZ EXCISION OF ILEUM, OPEN APPROACH: ICD-10-PCS | Performed by: SURGERY

## 2019-05-31 DEVICE — PROXIMATE LINEAR CUTTER RELOAD, BLUE, 75MM
Type: IMPLANTABLE DEVICE | Site: ABDOMEN | Status: FUNCTIONAL
Brand: PROXIMATE

## 2019-05-31 RX ORDER — DIPHENHYDRAMINE HYDROCHLORIDE 50 MG/ML
12.5 INJECTION INTRAMUSCULAR; INTRAVENOUS
Status: DISCONTINUED | OUTPATIENT
Start: 2019-05-31 | End: 2019-05-31 | Stop reason: HOSPADM

## 2019-05-31 RX ORDER — EPHEDRINE SULFATE 50 MG/ML
5 INJECTION, SOLUTION INTRAVENOUS ONCE AS NEEDED
Status: DISCONTINUED | OUTPATIENT
Start: 2019-05-31 | End: 2019-05-31 | Stop reason: SDUPTHER

## 2019-05-31 RX ORDER — ETOMIDATE 2 MG/ML
INJECTION INTRAVENOUS AS NEEDED
Status: DISCONTINUED | OUTPATIENT
Start: 2019-05-31 | End: 2019-05-31 | Stop reason: SURG

## 2019-05-31 RX ORDER — FAMOTIDINE 10 MG/ML
20 INJECTION, SOLUTION INTRAVENOUS ONCE
Status: DISCONTINUED | OUTPATIENT
Start: 2019-05-31 | End: 2019-05-31 | Stop reason: HOSPADM

## 2019-05-31 RX ORDER — FENTANYL CITRATE 50 UG/ML
50 INJECTION, SOLUTION INTRAMUSCULAR; INTRAVENOUS
Status: DISCONTINUED | OUTPATIENT
Start: 2019-05-31 | End: 2019-05-31 | Stop reason: HOSPADM

## 2019-05-31 RX ORDER — PROMETHAZINE HYDROCHLORIDE 25 MG/ML
12.5 INJECTION, SOLUTION INTRAMUSCULAR; INTRAVENOUS ONCE AS NEEDED
Status: DISCONTINUED | OUTPATIENT
Start: 2019-05-31 | End: 2019-05-31 | Stop reason: SDUPTHER

## 2019-05-31 RX ORDER — LIDOCAINE HYDROCHLORIDE 10 MG/ML
0.5 INJECTION, SOLUTION EPIDURAL; INFILTRATION; INTRACAUDAL; PERINEURAL ONCE AS NEEDED
Status: DISCONTINUED | OUTPATIENT
Start: 2019-05-31 | End: 2019-05-31 | Stop reason: HOSPADM

## 2019-05-31 RX ORDER — LABETALOL HYDROCHLORIDE 5 MG/ML
5 INJECTION, SOLUTION INTRAVENOUS
Status: DISCONTINUED | OUTPATIENT
Start: 2019-05-31 | End: 2019-05-31 | Stop reason: HOSPADM

## 2019-05-31 RX ORDER — SUCCINYLCHOLINE CHLORIDE 20 MG/ML
INJECTION INTRAMUSCULAR; INTRAVENOUS AS NEEDED
Status: DISCONTINUED | OUTPATIENT
Start: 2019-05-31 | End: 2019-05-31 | Stop reason: SURG

## 2019-05-31 RX ORDER — DEXTROSE MONOHYDRATE 25 G/50ML
25 INJECTION, SOLUTION INTRAVENOUS
Status: DISCONTINUED | OUTPATIENT
Start: 2019-05-31 | End: 2019-06-09 | Stop reason: HOSPADM

## 2019-05-31 RX ORDER — ROCURONIUM BROMIDE 10 MG/ML
INJECTION, SOLUTION INTRAVENOUS AS NEEDED
Status: DISCONTINUED | OUTPATIENT
Start: 2019-05-31 | End: 2019-05-31 | Stop reason: SURG

## 2019-05-31 RX ORDER — PROMETHAZINE HYDROCHLORIDE 25 MG/1
25 SUPPOSITORY RECTAL ONCE AS NEEDED
Status: DISCONTINUED | OUTPATIENT
Start: 2019-05-31 | End: 2019-05-31 | Stop reason: SDUPTHER

## 2019-05-31 RX ORDER — EPHEDRINE SULFATE 50 MG/ML
INJECTION, SOLUTION INTRAVENOUS AS NEEDED
Status: DISCONTINUED | OUTPATIENT
Start: 2019-05-31 | End: 2019-05-31 | Stop reason: SURG

## 2019-05-31 RX ORDER — ACETAMINOPHEN 325 MG/1
650 TABLET ORAL ONCE AS NEEDED
Status: DISCONTINUED | OUTPATIENT
Start: 2019-05-31 | End: 2019-05-31 | Stop reason: SDUPTHER

## 2019-05-31 RX ORDER — CALCIUM CHLORIDE 100 MG/ML
INJECTION INTRAVENOUS; INTRAVENTRICULAR AS NEEDED
Status: DISCONTINUED | OUTPATIENT
Start: 2019-05-31 | End: 2019-05-31 | Stop reason: SURG

## 2019-05-31 RX ORDER — HYDROMORPHONE HYDROCHLORIDE 1 MG/ML
0.5 INJECTION, SOLUTION INTRAMUSCULAR; INTRAVENOUS; SUBCUTANEOUS
Status: DISCONTINUED | OUTPATIENT
Start: 2019-05-31 | End: 2019-05-31 | Stop reason: SDUPTHER

## 2019-05-31 RX ORDER — PROMETHAZINE HYDROCHLORIDE 25 MG/1
25 TABLET ORAL ONCE AS NEEDED
Status: DISCONTINUED | OUTPATIENT
Start: 2019-05-31 | End: 2019-05-31 | Stop reason: SDUPTHER

## 2019-05-31 RX ORDER — HYDROCODONE BITARTRATE AND ACETAMINOPHEN 7.5; 325 MG/1; MG/1
1 TABLET ORAL ONCE AS NEEDED
Status: DISCONTINUED | OUTPATIENT
Start: 2019-05-31 | End: 2019-05-31 | Stop reason: SDUPTHER

## 2019-05-31 RX ORDER — PROMETHAZINE HYDROCHLORIDE 25 MG/ML
12.5 INJECTION, SOLUTION INTRAMUSCULAR; INTRAVENOUS ONCE AS NEEDED
Status: DISCONTINUED | OUTPATIENT
Start: 2019-05-31 | End: 2019-05-31 | Stop reason: HOSPADM

## 2019-05-31 RX ORDER — LABETALOL HYDROCHLORIDE 5 MG/ML
5 INJECTION, SOLUTION INTRAVENOUS
Status: DISCONTINUED | OUTPATIENT
Start: 2019-05-31 | End: 2019-05-31 | Stop reason: SDUPTHER

## 2019-05-31 RX ORDER — SODIUM CHLORIDE, SODIUM LACTATE, POTASSIUM CHLORIDE, CALCIUM CHLORIDE 600; 310; 30; 20 MG/100ML; MG/100ML; MG/100ML; MG/100ML
9 INJECTION, SOLUTION INTRAVENOUS CONTINUOUS
Status: DISCONTINUED | OUTPATIENT
Start: 2019-05-31 | End: 2019-05-31

## 2019-05-31 RX ORDER — PROMETHAZINE HYDROCHLORIDE 25 MG/ML
6.25 INJECTION, SOLUTION INTRAMUSCULAR; INTRAVENOUS
Status: DISCONTINUED | OUTPATIENT
Start: 2019-05-31 | End: 2019-05-31 | Stop reason: HOSPADM

## 2019-05-31 RX ORDER — SODIUM CHLORIDE 0.9 % (FLUSH) 0.9 %
1-10 SYRINGE (ML) INJECTION AS NEEDED
Status: DISCONTINUED | OUTPATIENT
Start: 2019-05-31 | End: 2019-05-31 | Stop reason: HOSPADM

## 2019-05-31 RX ORDER — DIPHENHYDRAMINE HYDROCHLORIDE 50 MG/ML
12.5 INJECTION INTRAMUSCULAR; INTRAVENOUS
Status: DISCONTINUED | OUTPATIENT
Start: 2019-05-31 | End: 2019-05-31 | Stop reason: SDUPTHER

## 2019-05-31 RX ORDER — MAGNESIUM HYDROXIDE 1200 MG/15ML
LIQUID ORAL AS NEEDED
Status: DISCONTINUED | OUTPATIENT
Start: 2019-05-31 | End: 2019-05-31 | Stop reason: HOSPADM

## 2019-05-31 RX ORDER — HYDRALAZINE HYDROCHLORIDE 20 MG/ML
5 INJECTION INTRAMUSCULAR; INTRAVENOUS
Status: DISCONTINUED | OUTPATIENT
Start: 2019-05-31 | End: 2019-05-31 | Stop reason: SDUPTHER

## 2019-05-31 RX ORDER — ACETAMINOPHEN 325 MG/1
650 TABLET ORAL ONCE AS NEEDED
Status: DISCONTINUED | OUTPATIENT
Start: 2019-05-31 | End: 2019-05-31 | Stop reason: HOSPADM

## 2019-05-31 RX ORDER — SODIUM CHLORIDE, SODIUM LACTATE, POTASSIUM CHLORIDE, CALCIUM CHLORIDE 600; 310; 30; 20 MG/100ML; MG/100ML; MG/100ML; MG/100ML
INJECTION, SOLUTION INTRAVENOUS CONTINUOUS PRN
Status: DISCONTINUED | OUTPATIENT
Start: 2019-05-31 | End: 2019-05-31 | Stop reason: SURG

## 2019-05-31 RX ORDER — ALBUMIN, HUMAN INJ 5% 5 %
SOLUTION INTRAVENOUS CONTINUOUS PRN
Status: DISCONTINUED | OUTPATIENT
Start: 2019-05-31 | End: 2019-05-31 | Stop reason: SURG

## 2019-05-31 RX ORDER — HYDROMORPHONE HYDROCHLORIDE 1 MG/ML
0.5 INJECTION, SOLUTION INTRAMUSCULAR; INTRAVENOUS; SUBCUTANEOUS
Status: DISCONTINUED | OUTPATIENT
Start: 2019-05-31 | End: 2019-05-31 | Stop reason: HOSPADM

## 2019-05-31 RX ORDER — ONDANSETRON 2 MG/ML
4 INJECTION INTRAMUSCULAR; INTRAVENOUS ONCE AS NEEDED
Status: DISCONTINUED | OUTPATIENT
Start: 2019-05-31 | End: 2019-05-31 | Stop reason: HOSPADM

## 2019-05-31 RX ORDER — LORAZEPAM 2 MG/ML
0.5 INJECTION INTRAMUSCULAR ONCE
Status: COMPLETED | OUTPATIENT
Start: 2019-05-31 | End: 2019-05-31

## 2019-05-31 RX ORDER — NICOTINE POLACRILEX 4 MG
15 LOZENGE BUCCAL
Status: DISCONTINUED | OUTPATIENT
Start: 2019-05-31 | End: 2019-06-09 | Stop reason: HOSPADM

## 2019-05-31 RX ORDER — ALBUMIN, HUMAN INJ 5% 5 %
500 SOLUTION INTRAVENOUS ONCE
Status: COMPLETED | OUTPATIENT
Start: 2019-05-31 | End: 2019-05-31

## 2019-05-31 RX ORDER — FLUMAZENIL 0.1 MG/ML
0.2 INJECTION INTRAVENOUS AS NEEDED
Status: DISCONTINUED | OUTPATIENT
Start: 2019-05-31 | End: 2019-05-31 | Stop reason: SDUPTHER

## 2019-05-31 RX ORDER — HYDROCODONE BITARTRATE AND ACETAMINOPHEN 7.5; 325 MG/1; MG/1
1 TABLET ORAL ONCE AS NEEDED
Status: DISCONTINUED | OUTPATIENT
Start: 2019-05-31 | End: 2019-05-31 | Stop reason: HOSPADM

## 2019-05-31 RX ORDER — MIDAZOLAM HYDROCHLORIDE 1 MG/ML
2 INJECTION INTRAMUSCULAR; INTRAVENOUS
Status: DISCONTINUED | OUTPATIENT
Start: 2019-05-31 | End: 2019-05-31 | Stop reason: HOSPADM

## 2019-05-31 RX ORDER — OXYCODONE AND ACETAMINOPHEN 7.5; 325 MG/1; MG/1
1 TABLET ORAL ONCE AS NEEDED
Status: DISCONTINUED | OUTPATIENT
Start: 2019-05-31 | End: 2019-05-31 | Stop reason: HOSPADM

## 2019-05-31 RX ORDER — SODIUM CHLORIDE 9 MG/ML
INJECTION, SOLUTION INTRAVENOUS CONTINUOUS PRN
Status: DISCONTINUED | OUTPATIENT
Start: 2019-05-31 | End: 2019-05-31 | Stop reason: SURG

## 2019-05-31 RX ORDER — MIDAZOLAM HYDROCHLORIDE 1 MG/ML
1 INJECTION INTRAMUSCULAR; INTRAVENOUS
Status: DISCONTINUED | OUTPATIENT
Start: 2019-05-31 | End: 2019-05-31 | Stop reason: HOSPADM

## 2019-05-31 RX ORDER — PROMETHAZINE HYDROCHLORIDE 25 MG/1
25 TABLET ORAL ONCE AS NEEDED
Status: DISCONTINUED | OUTPATIENT
Start: 2019-05-31 | End: 2019-05-31 | Stop reason: HOSPADM

## 2019-05-31 RX ORDER — PROMETHAZINE HYDROCHLORIDE 25 MG/ML
6.25 INJECTION, SOLUTION INTRAMUSCULAR; INTRAVENOUS
Status: DISCONTINUED | OUTPATIENT
Start: 2019-05-31 | End: 2019-05-31 | Stop reason: SDUPTHER

## 2019-05-31 RX ORDER — NALOXONE HCL 0.4 MG/ML
0.2 VIAL (ML) INJECTION AS NEEDED
Status: DISCONTINUED | OUTPATIENT
Start: 2019-05-31 | End: 2019-05-31 | Stop reason: HOSPADM

## 2019-05-31 RX ORDER — FENTANYL CITRATE 50 UG/ML
50 INJECTION, SOLUTION INTRAMUSCULAR; INTRAVENOUS
Status: DISCONTINUED | OUTPATIENT
Start: 2019-05-31 | End: 2019-05-31 | Stop reason: SDUPTHER

## 2019-05-31 RX ORDER — DIPHENHYDRAMINE HCL 25 MG
25 CAPSULE ORAL
Status: DISCONTINUED | OUTPATIENT
Start: 2019-05-31 | End: 2019-05-31 | Stop reason: HOSPADM

## 2019-05-31 RX ORDER — DIPHENHYDRAMINE HCL 25 MG
25 CAPSULE ORAL
Status: DISCONTINUED | OUTPATIENT
Start: 2019-05-31 | End: 2019-05-31 | Stop reason: SDUPTHER

## 2019-05-31 RX ORDER — HYDRALAZINE HYDROCHLORIDE 20 MG/ML
5 INJECTION INTRAMUSCULAR; INTRAVENOUS
Status: DISCONTINUED | OUTPATIENT
Start: 2019-05-31 | End: 2019-05-31 | Stop reason: HOSPADM

## 2019-05-31 RX ORDER — BUPIVACAINE HYDROCHLORIDE 2.5 MG/ML
INJECTION, SOLUTION EPIDURAL; INFILTRATION; INTRACAUDAL AS NEEDED
Status: DISCONTINUED | OUTPATIENT
Start: 2019-05-31 | End: 2019-05-31 | Stop reason: HOSPADM

## 2019-05-31 RX ORDER — ONDANSETRON 2 MG/ML
4 INJECTION INTRAMUSCULAR; INTRAVENOUS ONCE AS NEEDED
Status: DISCONTINUED | OUTPATIENT
Start: 2019-05-31 | End: 2019-05-31 | Stop reason: SDUPTHER

## 2019-05-31 RX ORDER — OXYCODONE AND ACETAMINOPHEN 7.5; 325 MG/1; MG/1
1 TABLET ORAL ONCE AS NEEDED
Status: DISCONTINUED | OUTPATIENT
Start: 2019-05-31 | End: 2019-05-31 | Stop reason: SDUPTHER

## 2019-05-31 RX ORDER — FLUMAZENIL 0.1 MG/ML
0.2 INJECTION INTRAVENOUS AS NEEDED
Status: DISCONTINUED | OUTPATIENT
Start: 2019-05-31 | End: 2019-05-31 | Stop reason: HOSPADM

## 2019-05-31 RX ORDER — NALOXONE HCL 0.4 MG/ML
0.2 VIAL (ML) INJECTION AS NEEDED
Status: DISCONTINUED | OUTPATIENT
Start: 2019-05-31 | End: 2019-05-31 | Stop reason: SDUPTHER

## 2019-05-31 RX ORDER — PROMETHAZINE HYDROCHLORIDE 25 MG/1
25 SUPPOSITORY RECTAL ONCE AS NEEDED
Status: DISCONTINUED | OUTPATIENT
Start: 2019-05-31 | End: 2019-05-31 | Stop reason: HOSPADM

## 2019-05-31 RX ORDER — EPHEDRINE SULFATE 50 MG/ML
5 INJECTION, SOLUTION INTRAVENOUS ONCE AS NEEDED
Status: DISCONTINUED | OUTPATIENT
Start: 2019-05-31 | End: 2019-05-31 | Stop reason: HOSPADM

## 2019-05-31 RX ADMIN — PHENYLEPHRINE HYDROCHLORIDE 100 MCG: 10 INJECTION INTRAVENOUS at 15:00

## 2019-05-31 RX ADMIN — EPHEDRINE SULFATE 10 MG: 50 INJECTION INTRAMUSCULAR; INTRAVENOUS; SUBCUTANEOUS at 13:59

## 2019-05-31 RX ADMIN — SODIUM CHLORIDE 500 ML: 9 INJECTION, SOLUTION INTRAVENOUS at 06:25

## 2019-05-31 RX ADMIN — ALBUMIN (HUMAN): 0.05 SOLUTION INTRAVENOUS at 14:05

## 2019-05-31 RX ADMIN — SODIUM CHLORIDE: 9 INJECTION, SOLUTION INTRAVENOUS at 14:25

## 2019-05-31 RX ADMIN — CALCIUM CHLORIDE 500 MG: 100 INJECTION, SOLUTION INTRAVENOUS; INTRAVENTRICULAR at 13:58

## 2019-05-31 RX ADMIN — ROCURONIUM BROMIDE 5 MG: 10 INJECTION INTRAVENOUS at 13:54

## 2019-05-31 RX ADMIN — FENTANYL CITRATE 25 MCG: 50 INJECTION INTRAMUSCULAR; INTRAVENOUS at 14:25

## 2019-05-31 RX ADMIN — HYDROMORPHONE HYDROCHLORIDE 0.5 MG: 1 INJECTION, SOLUTION INTRAMUSCULAR; INTRAVENOUS; SUBCUTANEOUS at 19:53

## 2019-05-31 RX ADMIN — HYDROMORPHONE HYDROCHLORIDE 0.5 MG: 1 INJECTION, SOLUTION INTRAMUSCULAR; INTRAVENOUS; SUBCUTANEOUS at 21:59

## 2019-05-31 RX ADMIN — HYDROMORPHONE HYDROCHLORIDE 0.5 MG: 1 INJECTION, SOLUTION INTRAMUSCULAR; INTRAVENOUS; SUBCUTANEOUS at 17:35

## 2019-05-31 RX ADMIN — PHENYLEPHRINE HYDROCHLORIDE 100 MCG: 10 INJECTION INTRAVENOUS at 15:15

## 2019-05-31 RX ADMIN — SODIUM CHLORIDE 1554 ML: 9 INJECTION, SOLUTION INTRAVENOUS at 21:23

## 2019-05-31 RX ADMIN — ROCURONIUM BROMIDE 25 MG: 10 INJECTION INTRAVENOUS at 14:05

## 2019-05-31 RX ADMIN — ALBUMIN (HUMAN): 0.05 SOLUTION INTRAVENOUS at 13:40

## 2019-05-31 RX ADMIN — PHENYLEPHRINE HYDROCHLORIDE 100 MCG: 10 INJECTION INTRAVENOUS at 14:57

## 2019-05-31 RX ADMIN — ETOMIDATE 14 MG: 2 INJECTION, SOLUTION INTRAVENOUS at 13:54

## 2019-05-31 RX ADMIN — PHENYLEPHRINE HYDROCHLORIDE 100 MCG: 10 INJECTION INTRAVENOUS at 15:10

## 2019-05-31 RX ADMIN — SODIUM CHLORIDE, POTASSIUM CHLORIDE, SODIUM LACTATE AND CALCIUM CHLORIDE 9 ML/HR: 600; 310; 30; 20 INJECTION, SOLUTION INTRAVENOUS at 13:21

## 2019-05-31 RX ADMIN — PHENYLEPHRINE HYDROCHLORIDE 100 MCG: 10 INJECTION INTRAVENOUS at 14:15

## 2019-05-31 RX ADMIN — SUCCINYLCHOLINE CHLORIDE 120 MG: 20 INJECTION, SOLUTION INTRAMUSCULAR; INTRAVENOUS; PARENTERAL at 13:54

## 2019-05-31 RX ADMIN — ALBUMIN (HUMAN): 0.05 SOLUTION INTRAVENOUS at 13:46

## 2019-05-31 RX ADMIN — FAMOTIDINE 20 MG: 10 INJECTION INTRAVENOUS at 08:09

## 2019-05-31 RX ADMIN — ONDANSETRON HYDROCHLORIDE 4 MG: 2 SOLUTION INTRAMUSCULAR; INTRAVENOUS at 13:50

## 2019-05-31 RX ADMIN — CALCIUM CHLORIDE 500 MG: 100 INJECTION, SOLUTION INTRAVENOUS; INTRAVENTRICULAR at 15:03

## 2019-05-31 RX ADMIN — ALBUMIN HUMAN 500 ML: 0.05 INJECTION, SOLUTION INTRAVENOUS at 12:57

## 2019-05-31 RX ADMIN — FAMOTIDINE 20 MG: 10 INJECTION INTRAVENOUS at 20:19

## 2019-05-31 RX ADMIN — PHENYLEPHRINE HYDROCHLORIDE 100 MCG: 10 INJECTION INTRAVENOUS at 15:25

## 2019-05-31 RX ADMIN — TAZOBACTAM SODIUM AND PIPERACILLIN SODIUM 3.38 G: 375; 3 INJECTION, SOLUTION INTRAVENOUS at 09:01

## 2019-05-31 RX ADMIN — PHENYLEPHRINE HYDROCHLORIDE 100 MCG: 10 INJECTION INTRAVENOUS at 15:20

## 2019-05-31 RX ADMIN — ROCURONIUM BROMIDE 10 MG: 10 INJECTION INTRAVENOUS at 14:54

## 2019-05-31 RX ADMIN — PHENYLEPHRINE HYDROCHLORIDE 100 MCG: 10 INJECTION INTRAVENOUS at 15:07

## 2019-05-31 RX ADMIN — POTASSIUM CHLORIDE, DEXTROSE MONOHYDRATE AND SODIUM CHLORIDE 150 ML/HR: 150; 5; 450 INJECTION, SOLUTION INTRAVENOUS at 19:00

## 2019-05-31 RX ADMIN — SUGAMMADEX 400 MG: 100 INJECTION, SOLUTION INTRAVENOUS at 15:55

## 2019-05-31 RX ADMIN — FENTANYL CITRATE 25 MCG: 50 INJECTION INTRAMUSCULAR; INTRAVENOUS at 15:03

## 2019-05-31 RX ADMIN — PHENYLEPHRINE HYDROCHLORIDE 100 MCG: 10 INJECTION INTRAVENOUS at 14:43

## 2019-05-31 RX ADMIN — SODIUM CHLORIDE, POTASSIUM CHLORIDE, SODIUM LACTATE AND CALCIUM CHLORIDE: 600; 310; 30; 20 INJECTION, SOLUTION INTRAVENOUS at 14:25

## 2019-05-31 RX ADMIN — PHENYLEPHRINE HYDROCHLORIDE 100 MCG: 10 INJECTION INTRAVENOUS at 15:31

## 2019-05-31 RX ADMIN — PHENYLEPHRINE HYDROCHLORIDE 100 MCG: 10 INJECTION INTRAVENOUS at 14:38

## 2019-05-31 RX ADMIN — PHENYLEPHRINE HYDROCHLORIDE 0.5 MCG/KG/MIN: 10 INJECTION INTRAVENOUS at 18:59

## 2019-05-31 RX ADMIN — PHENYLEPHRINE HYDROCHLORIDE 100 MCG: 10 INJECTION INTRAVENOUS at 14:46

## 2019-05-31 RX ADMIN — MORPHINE SULFATE 2 MG: 2 INJECTION, SOLUTION INTRAMUSCULAR; INTRAVENOUS at 04:10

## 2019-05-31 RX ADMIN — FENTANYL CITRATE 25 MCG: 50 INJECTION INTRAMUSCULAR; INTRAVENOUS at 14:18

## 2019-05-31 RX ADMIN — FENTANYL CITRATE 25 MCG: 50 INJECTION INTRAMUSCULAR; INTRAVENOUS at 14:52

## 2019-05-31 RX ADMIN — LORAZEPAM 0.5 MG: 2 INJECTION INTRAMUSCULAR; INTRAVENOUS at 01:12

## 2019-05-31 RX ADMIN — PHENYLEPHRINE HYDROCHLORIDE 100 MCG: 10 INJECTION INTRAVENOUS at 14:33

## 2019-05-31 RX ADMIN — EPHEDRINE SULFATE 10 MG: 50 INJECTION INTRAMUSCULAR; INTRAVENOUS; SUBCUTANEOUS at 14:46

## 2019-05-31 RX ADMIN — SODIUM CHLORIDE, POTASSIUM CHLORIDE, SODIUM LACTATE AND CALCIUM CHLORIDE: 600; 310; 30; 20 INJECTION, SOLUTION INTRAVENOUS at 15:58

## 2019-05-31 NOTE — ANESTHESIA PROCEDURE NOTES
Arterial Line      Patient location during procedure: holding area  Start time: 5/31/2019 12:45 PM  Stop Time:5/31/2019 12:55 PM       Line placed for hemodynamic monitoring.  Performed By   Anesthesiologist: Eren Taylor MD  Preanesthetic Checklist  Completed: patient identified, site marked, surgical consent, pre-op evaluation, timeout performed, IV checked, risks and benefits discussed and monitors and equipment checked  Arterial Line Prep   Sterile Tech: mask and cap  Prep: ChloraPrep  Patient monitoring: blood pressure monitoring, continuous pulse oximetry and EKG  Arterial Line Procedure   Laterality:right  Location:  radial artery  Catheter size: 20 G   Guidance: landmark technique  Number of attempts: 1  Successful placement: yes          Post Assessment   Dressing Type: occlusive dressing applied, secured with tape and wrist guard applied.   Complications no  Circ/Move/Sens Assessment: unchanged.   Patient Tolerance: patient tolerated the procedure well with no apparent complications

## 2019-05-31 NOTE — ANESTHESIA PREPROCEDURE EVALUATION
Anesthesia Evaluation     Patient summary reviewed and Nursing notes reviewed   NPO Solid Status: > 8 hours  NPO Liquid Status: > 8 hours           Airway   Mallampati: II  Neck ROM: full  No difficulty expected  Dental    (+) lower dentures and upper dentures    Pulmonary     breath sounds clear to auscultation  (+) a smoker Former, sleep apnea,   Cardiovascular     Rhythm: regular    (+) hypertension,       Neuro/Psych  (+) psychiatric history Anxiety,     GI/Hepatic/Renal/Endo    (+)  hiatal hernia, GERD,      Musculoskeletal     Abdominal    Substance History      OB/GYN          Other   (+) arthritis                     Anesthesia Plan    ASA 4     general   Rapid sequence(A line, NGT with high out put RSI)  intravenous induction   Anesthetic plan, all risks, benefits, and alternatives have been provided, discussed and informed consent has been obtained with: patient.

## 2019-05-31 NOTE — ANESTHESIA PROCEDURE NOTES
Airway  Urgency: elective    Difficult airway    General Information and Staff    Patient location during procedure: OR  Anesthesiologist: Eren Taylor MD  CRNA: Rosana Dalton CRNA    Indications and Patient Condition  Indications for airway management: airway protection    Preoxygenated: yes  Mask difficulty assessment: 0 - not attempted    Final Airway Details  Final airway type: endotracheal airway      Successful airway: ETT  Cuffed: yes   Successful intubation technique: direct laryngoscopy and RSI  Facilitating devices/methods: intubating stylet  Endotracheal tube insertion site: oral  Blade: Ontiveros  Blade size: 2  ETT size (mm): 7.0  Cormack-Lehane Classification: grade I - full view of glottis  Placement verified by: chest auscultation and capnometry   Cuff volume (mL): 7  Measured from: gums  ETT to gums (cm): 19  Number of attempts at approach: 1

## 2019-05-31 NOTE — ANESTHESIA POSTPROCEDURE EVALUATION
"Patient: Brittny Fay    Procedure Summary     Date:  05/31/19 Room / Location:  [facility] OR  / [facility] MAIN OR    Anesthesia Start:  1345 Anesthesia Stop:  1620    Procedure:  exploratory laparotomy (N/A Abdomen) Diagnosis:       SBO (small bowel obstruction) (CMS/HCC)      (SBO (small bowel obstruction) (CMS/HCC) [K56.609])    Surgeon:  Silvano Alarcon MD Provider:  Eren Taylor MD    Anesthesia Type:  general ASA Status:  4          Anesthesia Type: general  Last vitals  BP   96/62 (05/31/19 1730)   Temp   36.4 °C (97.5 °F) (05/31/19 1730)   Pulse   90 (05/31/19 1730)   Resp   16 (05/31/19 1730)     SpO2   93 % (05/31/19 1730)     Post Anesthesia Care and Evaluation    Patient location during evaluation: bedside  Patient participation: complete - patient participated  Level of consciousness: awake and alert  Pain management: adequate  Airway patency: patent  Anesthetic complications: No anesthetic complications    Cardiovascular status: acceptable  Respiratory status: acceptable  Hydration status: acceptable    Comments: BP 96/62 (BP Location: Right arm, Patient Position: Lying)   Pulse 90   Temp 36.4 °C (97.5 °F) (Oral)   Resp 16   Ht 164.6 cm (64.8\")   Wt 51.8 kg (114 lb 1.6 oz)   SpO2 93%   BMI 19.10 kg/m²       "

## 2019-06-01 LAB
ANION GAP SERPL CALCULATED.3IONS-SCNC: 8 MMOL/L
BASOPHILS # BLD AUTO: 0.02 10*3/MM3 (ref 0–0.2)
BASOPHILS NFR BLD AUTO: 0.3 % (ref 0–1.5)
BUN BLD-MCNC: 32 MG/DL (ref 8–23)
BUN/CREAT SERPL: 28.6 (ref 7–25)
BURR CELLS BLD QL SMEAR: NORMAL
CALCIUM SPEC-SCNC: 6.9 MG/DL (ref 8.6–10.5)
CHLORIDE SERPL-SCNC: 109 MMOL/L (ref 98–107)
CO2 SERPL-SCNC: 20 MMOL/L (ref 22–29)
CREAT BLD-MCNC: 1.12 MG/DL (ref 0.57–1)
D-LACTATE SERPL-SCNC: 2.6 MMOL/L (ref 0.5–2)
DEPRECATED RDW RBC AUTO: 44.8 FL (ref 37–54)
DEPRECATED RDW RBC AUTO: 46.5 FL (ref 37–54)
EOSINOPHIL # BLD AUTO: 0.08 10*3/MM3 (ref 0–0.4)
EOSINOPHIL NFR BLD AUTO: 1.1 % (ref 0.3–6.2)
ERYTHROCYTE [DISTWIDTH] IN BLOOD BY AUTOMATED COUNT: 13.7 % (ref 12.3–15.4)
ERYTHROCYTE [DISTWIDTH] IN BLOOD BY AUTOMATED COUNT: 13.7 % (ref 12.3–15.4)
GFR SERPL CREATININE-BSD FRML MDRD: 47 ML/MIN/1.73
GLUCOSE BLD-MCNC: 106 MG/DL (ref 65–99)
GLUCOSE BLDC GLUCOMTR-MCNC: 71 MG/DL (ref 70–130)
GLUCOSE BLDC GLUCOMTR-MCNC: 73 MG/DL (ref 70–130)
GLUCOSE BLDC GLUCOMTR-MCNC: 75 MG/DL (ref 70–130)
GLUCOSE BLDC GLUCOMTR-MCNC: 80 MG/DL (ref 70–130)
GLUCOSE BLDC GLUCOMTR-MCNC: 88 MG/DL (ref 70–130)
HCT VFR BLD AUTO: 25.2 % (ref 34–46.6)
HCT VFR BLD AUTO: 31.5 % (ref 34–46.6)
HGB BLD-MCNC: 10.1 G/DL (ref 12–15.9)
HGB BLD-MCNC: 7.8 G/DL (ref 12–15.9)
HOLD SPECIMEN: NORMAL
IMM GRANULOCYTES # BLD AUTO: 0.01 10*3/MM3 (ref 0–0.05)
IMM GRANULOCYTES NFR BLD AUTO: 0.1 % (ref 0–0.5)
LYMPHOCYTES # BLD AUTO: 1.49 10*3/MM3 (ref 0.7–3.1)
LYMPHOCYTES NFR BLD AUTO: 21.4 % (ref 19.6–45.3)
MCH RBC QN AUTO: 28.5 PG (ref 26.6–33)
MCH RBC QN AUTO: 28.7 PG (ref 26.6–33)
MCHC RBC AUTO-ENTMCNC: 31 G/DL (ref 31.5–35.7)
MCHC RBC AUTO-ENTMCNC: 32.1 G/DL (ref 31.5–35.7)
MCV RBC AUTO: 89 FL (ref 79–97)
MCV RBC AUTO: 92.6 FL (ref 79–97)
MONOCYTES # BLD AUTO: 0.3 10*3/MM3 (ref 0.1–0.9)
MONOCYTES NFR BLD AUTO: 4.3 % (ref 5–12)
NEUTROPHILS # BLD AUTO: 5.06 10*3/MM3 (ref 1.7–7)
NEUTROPHILS NFR BLD AUTO: 72.8 % (ref 42.7–76)
NRBC BLD AUTO-RTO: 0 /100 WBC (ref 0–0.2)
PLAT MORPH BLD: NORMAL
PLATELET # BLD AUTO: 126 10*3/MM3 (ref 140–450)
PLATELET # BLD AUTO: 172 10*3/MM3 (ref 140–450)
PMV BLD AUTO: 10.6 FL (ref 6–12)
PMV BLD AUTO: 11.5 FL (ref 6–12)
POTASSIUM BLD-SCNC: 3.9 MMOL/L (ref 3.5–5.2)
RBC # BLD AUTO: 2.72 10*6/MM3 (ref 3.77–5.28)
RBC # BLD AUTO: 3.54 10*6/MM3 (ref 3.77–5.28)
SODIUM BLD-SCNC: 137 MMOL/L (ref 136–145)
WBC MORPH BLD: NORMAL
WBC NRBC COR # BLD: 12.08 10*3/MM3 (ref 3.4–10.8)
WBC NRBC COR # BLD: 6.96 10*3/MM3 (ref 3.4–10.8)

## 2019-06-01 PROCEDURE — 25010000002 PHENYLEPHRINE 10 MG/ML SOLUTION 5 ML VIAL: Performed by: INTERNAL MEDICINE

## 2019-06-01 PROCEDURE — 99024 POSTOP FOLLOW-UP VISIT: CPT | Performed by: SURGERY

## 2019-06-01 PROCEDURE — 83605 ASSAY OF LACTIC ACID: CPT | Performed by: INTERNAL MEDICINE

## 2019-06-01 PROCEDURE — 80048 BASIC METABOLIC PNL TOTAL CA: CPT | Performed by: SURGERY

## 2019-06-01 PROCEDURE — 02HV33Z INSERTION OF INFUSION DEVICE INTO SUPERIOR VENA CAVA, PERCUTANEOUS APPROACH: ICD-10-PCS | Performed by: SURGERY

## 2019-06-01 PROCEDURE — 25010000002 ENOXAPARIN PER 10 MG: Performed by: INTERNAL MEDICINE

## 2019-06-01 PROCEDURE — 86900 BLOOD TYPING SEROLOGIC ABO: CPT

## 2019-06-01 PROCEDURE — 36430 TRANSFUSION BLD/BLD COMPNT: CPT

## 2019-06-01 PROCEDURE — P9016 RBC LEUKOCYTES REDUCED: HCPCS

## 2019-06-01 PROCEDURE — 25010000002 PHENTOLAMINE MESYLATE PER 5 MG: Performed by: INTERNAL MEDICINE

## 2019-06-01 PROCEDURE — 25010000002 PIPERACILLIN SOD-TAZOBACTAM PER 1 G: Performed by: SURGERY

## 2019-06-01 PROCEDURE — 85027 COMPLETE CBC AUTOMATED: CPT | Performed by: INTERNAL MEDICINE

## 2019-06-01 PROCEDURE — 85025 COMPLETE CBC W/AUTO DIFF WBC: CPT | Performed by: SURGERY

## 2019-06-01 PROCEDURE — C1751 CATH, INF, PER/CENT/MIDLINE: HCPCS

## 2019-06-01 PROCEDURE — 82962 GLUCOSE BLOOD TEST: CPT

## 2019-06-01 PROCEDURE — 85007 BL SMEAR W/DIFF WBC COUNT: CPT | Performed by: SURGERY

## 2019-06-01 RX ORDER — PHENTOLAMINE MESYLATE 5 MG/1
10 INJECTION INTRAMUSCULAR; INTRAVENOUS ONCE
Status: COMPLETED | OUTPATIENT
Start: 2019-06-01 | End: 2019-06-01

## 2019-06-01 RX ORDER — FAMOTIDINE 10 MG/ML
20 INJECTION, SOLUTION INTRAVENOUS DAILY
Status: DISCONTINUED | OUTPATIENT
Start: 2019-06-02 | End: 2019-06-03

## 2019-06-01 RX ADMIN — ENOXAPARIN SODIUM 40 MG: 40 INJECTION SUBCUTANEOUS at 11:46

## 2019-06-01 RX ADMIN — POTASSIUM CHLORIDE, DEXTROSE MONOHYDRATE AND SODIUM CHLORIDE 150 ML/HR: 150; 5; 450 INJECTION, SOLUTION INTRAVENOUS at 14:26

## 2019-06-01 RX ADMIN — POTASSIUM CHLORIDE, DEXTROSE MONOHYDRATE AND SODIUM CHLORIDE 150 ML/HR: 150; 5; 450 INJECTION, SOLUTION INTRAVENOUS at 07:54

## 2019-06-01 RX ADMIN — TAZOBACTAM SODIUM AND PIPERACILLIN SODIUM 3.38 G: 375; 3 INJECTION, SOLUTION INTRAVENOUS at 23:01

## 2019-06-01 RX ADMIN — PHENYLEPHRINE HYDROCHLORIDE 3 MCG/KG/MIN: 10 INJECTION INTRAVENOUS at 14:23

## 2019-06-01 RX ADMIN — PHENTOLAMINE MESYLATE 10 MG: 5 INJECTION, POWDER, FOR SOLUTION INTRAMUSCULAR; INTRAVENOUS at 13:42

## 2019-06-01 RX ADMIN — PHENYLEPHRINE HYDROCHLORIDE 3 MCG/KG/MIN: 10 INJECTION INTRAVENOUS at 08:41

## 2019-06-01 RX ADMIN — POTASSIUM CHLORIDE, DEXTROSE MONOHYDRATE AND SODIUM CHLORIDE 150 ML/HR: 150; 5; 450 INJECTION, SOLUTION INTRAVENOUS at 01:05

## 2019-06-01 RX ADMIN — TAZOBACTAM SODIUM AND PIPERACILLIN SODIUM 3.38 G: 375; 3 INJECTION, SOLUTION INTRAVENOUS at 10:59

## 2019-06-01 RX ADMIN — FAMOTIDINE 20 MG: 10 INJECTION INTRAVENOUS at 08:22

## 2019-06-01 RX ADMIN — PHENYLEPHRINE HYDROCHLORIDE 3 MCG/KG/MIN: 10 INJECTION INTRAVENOUS at 02:14

## 2019-06-01 RX ADMIN — TAZOBACTAM SODIUM AND PIPERACILLIN SODIUM 3.38 G: 375; 3 INJECTION, SOLUTION INTRAVENOUS at 16:00

## 2019-06-01 RX ADMIN — POTASSIUM CHLORIDE, DEXTROSE MONOHYDRATE AND SODIUM CHLORIDE 150 ML/HR: 150; 5; 450 INJECTION, SOLUTION INTRAVENOUS at 23:05

## 2019-06-01 RX ADMIN — TAZOBACTAM SODIUM AND PIPERACILLIN SODIUM 3.38 G: 375; 3 INJECTION, SOLUTION INTRAVENOUS at 00:19

## 2019-06-02 LAB
ABO + RH BLD: NORMAL
ANION GAP SERPL CALCULATED.3IONS-SCNC: 8.7 MMOL/L
BH BB BLOOD EXPIRATION DATE: NORMAL
BH BB BLOOD TYPE BARCODE: 5100
BH BB DISPENSE STATUS: NORMAL
BH BB PRODUCT CODE: NORMAL
BH BB UNIT NUMBER: NORMAL
BUN BLD-MCNC: 24 MG/DL (ref 8–23)
BUN/CREAT SERPL: 29.3 (ref 7–25)
CALCIUM SPEC-SCNC: 6.7 MG/DL (ref 8.6–10.5)
CALCIUM SPEC-SCNC: 6.7 MG/DL (ref 8.6–10.5)
CHLORIDE SERPL-SCNC: 108 MMOL/L (ref 98–107)
CO2 SERPL-SCNC: 20.3 MMOL/L (ref 22–29)
CREAT BLD-MCNC: 0.82 MG/DL (ref 0.57–1)
DEPRECATED RDW RBC AUTO: 45.2 FL (ref 37–54)
ERYTHROCYTE [DISTWIDTH] IN BLOOD BY AUTOMATED COUNT: 13.8 % (ref 12.3–15.4)
GFR SERPL CREATININE-BSD FRML MDRD: 67 ML/MIN/1.73
GLUCOSE BLD-MCNC: 98 MG/DL (ref 65–99)
GLUCOSE BLDC GLUCOMTR-MCNC: 101 MG/DL (ref 70–130)
GLUCOSE BLDC GLUCOMTR-MCNC: 104 MG/DL (ref 70–130)
GLUCOSE BLDC GLUCOMTR-MCNC: 75 MG/DL (ref 70–130)
GLUCOSE BLDC GLUCOMTR-MCNC: 86 MG/DL (ref 70–130)
HCT VFR BLD AUTO: 28.8 % (ref 34–46.6)
HGB BLD-MCNC: 9.5 G/DL (ref 12–15.9)
MAGNESIUM SERPL-MCNC: 1.4 MG/DL (ref 1.6–2.4)
MCH RBC QN AUTO: 29.3 PG (ref 26.6–33)
MCHC RBC AUTO-ENTMCNC: 33 G/DL (ref 31.5–35.7)
MCV RBC AUTO: 88.9 FL (ref 79–97)
PHOSPHATE SERPL-MCNC: 1.6 MG/DL (ref 2.5–4.5)
PLATELET # BLD AUTO: 112 10*3/MM3 (ref 140–450)
PMV BLD AUTO: 10.7 FL (ref 6–12)
POTASSIUM BLD-SCNC: 3.6 MMOL/L (ref 3.5–5.2)
RBC # BLD AUTO: 3.24 10*6/MM3 (ref 3.77–5.28)
SODIUM BLD-SCNC: 137 MMOL/L (ref 136–145)
TROPONIN T SERPL-MCNC: <0.01 NG/ML (ref 0–0.03)
UNIT  ABO: NORMAL
UNIT  RH: NORMAL
WBC NRBC COR # BLD: 11.03 10*3/MM3 (ref 3.4–10.8)

## 2019-06-02 PROCEDURE — 25010000002 ENOXAPARIN PER 10 MG: Performed by: SURGERY

## 2019-06-02 PROCEDURE — 25010000002 MAGNESIUM SULFATE IN D5W 1G/100ML (PREMIX) 1-5 GM/100ML-% SOLUTION: Performed by: INTERNAL MEDICINE

## 2019-06-02 PROCEDURE — 25010000002 MAGNESIUM SULFATE PER 500 MG OF MAGNESIUM: Performed by: SURGERY

## 2019-06-02 PROCEDURE — 25010000002 PIPERACILLIN SOD-TAZOBACTAM PER 1 G: Performed by: SURGERY

## 2019-06-02 PROCEDURE — 99024 POSTOP FOLLOW-UP VISIT: CPT | Performed by: SURGERY

## 2019-06-02 PROCEDURE — 85027 COMPLETE CBC AUTOMATED: CPT | Performed by: INTERNAL MEDICINE

## 2019-06-02 PROCEDURE — 3E0436Z INTRODUCTION OF NUTRITIONAL SUBSTANCE INTO CENTRAL VEIN, PERCUTANEOUS APPROACH: ICD-10-PCS | Performed by: SURGERY

## 2019-06-02 PROCEDURE — 25010000002 CALCIUM GLUCONATE PER 10 ML: Performed by: SURGERY

## 2019-06-02 PROCEDURE — 25010000002 HYDROMORPHONE PER 4 MG: Performed by: SURGERY

## 2019-06-02 PROCEDURE — 82310 ASSAY OF CALCIUM: CPT | Performed by: INTERNAL MEDICINE

## 2019-06-02 PROCEDURE — 84484 ASSAY OF TROPONIN QUANT: CPT | Performed by: INTERNAL MEDICINE

## 2019-06-02 PROCEDURE — 80048 BASIC METABOLIC PNL TOTAL CA: CPT | Performed by: INTERNAL MEDICINE

## 2019-06-02 PROCEDURE — 83735 ASSAY OF MAGNESIUM: CPT | Performed by: INTERNAL MEDICINE

## 2019-06-02 PROCEDURE — 82962 GLUCOSE BLOOD TEST: CPT

## 2019-06-02 PROCEDURE — 84100 ASSAY OF PHOSPHORUS: CPT | Performed by: INTERNAL MEDICINE

## 2019-06-02 RX ORDER — MAGNESIUM SULFATE 1 G/100ML
2 INJECTION INTRAVENOUS ONCE
Status: COMPLETED | OUTPATIENT
Start: 2019-06-02 | End: 2019-06-02

## 2019-06-02 RX ADMIN — POTASSIUM CHLORIDE, DEXTROSE MONOHYDRATE AND SODIUM CHLORIDE 150 ML/HR: 150; 5; 450 INJECTION, SOLUTION INTRAVENOUS at 06:03

## 2019-06-02 RX ADMIN — CALCIUM GLUCONATE: 94 INJECTION, SOLUTION INTRAVENOUS at 17:41

## 2019-06-02 RX ADMIN — POTASSIUM CHLORIDE, DEXTROSE MONOHYDRATE AND SODIUM CHLORIDE 150 ML/HR: 150; 5; 450 INJECTION, SOLUTION INTRAVENOUS at 12:26

## 2019-06-02 RX ADMIN — FAMOTIDINE 20 MG: 10 INJECTION INTRAVENOUS at 08:19

## 2019-06-02 RX ADMIN — MAGNESIUM SULFATE HEPTAHYDRATE 2 G: 1 INJECTION, SOLUTION INTRAVENOUS at 06:02

## 2019-06-02 RX ADMIN — TAZOBACTAM SODIUM AND PIPERACILLIN SODIUM 3.38 G: 375; 3 INJECTION, SOLUTION INTRAVENOUS at 23:54

## 2019-06-02 RX ADMIN — POTASSIUM PHOSPHATE, MONOBASIC AND POTASSIUM PHOSPHATE, DIBASIC 30 MMOL: 224; 236 INJECTION, SOLUTION, CONCENTRATE INTRAVENOUS at 11:23

## 2019-06-02 RX ADMIN — HYDROMORPHONE HYDROCHLORIDE 0.5 MG: 1 INJECTION, SOLUTION INTRAMUSCULAR; INTRAVENOUS; SUBCUTANEOUS at 23:54

## 2019-06-02 RX ADMIN — TAZOBACTAM SODIUM AND PIPERACILLIN SODIUM 3.38 G: 375; 3 INJECTION, SOLUTION INTRAVENOUS at 08:19

## 2019-06-02 RX ADMIN — ENOXAPARIN SODIUM 40 MG: 40 INJECTION SUBCUTANEOUS at 12:25

## 2019-06-02 RX ADMIN — POTASSIUM CHLORIDE, DEXTROSE MONOHYDRATE AND SODIUM CHLORIDE 60 ML/HR: 150; 5; 450 INJECTION, SOLUTION INTRAVENOUS at 21:45

## 2019-06-02 RX ADMIN — TAZOBACTAM SODIUM AND PIPERACILLIN SODIUM 3.38 G: 375; 3 INJECTION, SOLUTION INTRAVENOUS at 15:37

## 2019-06-03 LAB
ALBUMIN SERPL-MCNC: 2 G/DL (ref 3.5–5.2)
ALBUMIN/GLOB SERPL: 0.9 G/DL
ALP SERPL-CCNC: 65 U/L (ref 39–117)
ALT SERPL W P-5'-P-CCNC: 14 U/L (ref 1–33)
ANION GAP SERPL CALCULATED.3IONS-SCNC: 6.5 MMOL/L
AST SERPL-CCNC: 12 U/L (ref 1–32)
BILIRUB SERPL-MCNC: 0.5 MG/DL (ref 0.2–1.2)
BUN BLD-MCNC: 14 MG/DL (ref 8–23)
BUN/CREAT SERPL: 24.6 (ref 7–25)
CALCIUM SPEC-SCNC: 7.1 MG/DL (ref 8.6–10.5)
CHLORIDE SERPL-SCNC: 109 MMOL/L (ref 98–107)
CO2 SERPL-SCNC: 24.5 MMOL/L (ref 22–29)
CREAT BLD-MCNC: 0.57 MG/DL (ref 0.57–1)
CYTO UR: NORMAL
D-LACTATE SERPL-SCNC: 1.1 MMOL/L (ref 0.5–2)
GFR SERPL CREATININE-BSD FRML MDRD: 102 ML/MIN/1.73
GLOBULIN UR ELPH-MCNC: 2.2 GM/DL
GLUCOSE BLD-MCNC: 110 MG/DL (ref 65–99)
GLUCOSE BLDC GLUCOMTR-MCNC: 108 MG/DL (ref 70–130)
GLUCOSE BLDC GLUCOMTR-MCNC: 108 MG/DL (ref 70–130)
LAB AP CASE REPORT: NORMAL
MAGNESIUM SERPL-MCNC: 2.2 MG/DL (ref 1.6–2.4)
PATH REPORT.FINAL DX SPEC: NORMAL
PATH REPORT.GROSS SPEC: NORMAL
PHOSPHATE SERPL-MCNC: 1.7 MG/DL (ref 2.5–4.5)
POTASSIUM BLD-SCNC: 3.4 MMOL/L (ref 3.5–5.2)
PROT SERPL-MCNC: 4.2 G/DL (ref 6–8.5)
SODIUM BLD-SCNC: 140 MMOL/L (ref 136–145)
TRIGL SERPL-MCNC: 65 MG/DL (ref 0–150)

## 2019-06-03 PROCEDURE — 25010000002 HYDROMORPHONE PER 4 MG: Performed by: SURGERY

## 2019-06-03 PROCEDURE — 25010000002 ENOXAPARIN PER 10 MG: Performed by: SURGERY

## 2019-06-03 PROCEDURE — 25010000002 PIPERACILLIN SOD-TAZOBACTAM PER 1 G: Performed by: SURGERY

## 2019-06-03 PROCEDURE — 80053 COMPREHEN METABOLIC PANEL: CPT | Performed by: SURGERY

## 2019-06-03 PROCEDURE — 25010000002 CALCIUM GLUCONATE PER 10 ML: Performed by: INTERNAL MEDICINE

## 2019-06-03 PROCEDURE — 83605 ASSAY OF LACTIC ACID: CPT | Performed by: INTERNAL MEDICINE

## 2019-06-03 PROCEDURE — 84100 ASSAY OF PHOSPHORUS: CPT | Performed by: SURGERY

## 2019-06-03 PROCEDURE — 99024 POSTOP FOLLOW-UP VISIT: CPT | Performed by: SURGERY

## 2019-06-03 PROCEDURE — 25010000002 MAGNESIUM SULFATE PER 500 MG OF MAGNESIUM: Performed by: INTERNAL MEDICINE

## 2019-06-03 PROCEDURE — 83735 ASSAY OF MAGNESIUM: CPT | Performed by: SURGERY

## 2019-06-03 PROCEDURE — 84478 ASSAY OF TRIGLYCERIDES: CPT | Performed by: SURGERY

## 2019-06-03 PROCEDURE — 82962 GLUCOSE BLOOD TEST: CPT

## 2019-06-03 RX ORDER — SODIUM CHLORIDE 0.9 % (FLUSH) 0.9 %
10 SYRINGE (ML) INJECTION AS NEEDED
Status: DISCONTINUED | OUTPATIENT
Start: 2019-06-03 | End: 2019-06-09 | Stop reason: HOSPADM

## 2019-06-03 RX ORDER — SODIUM CHLORIDE 0.9 % (FLUSH) 0.9 %
20 SYRINGE (ML) INJECTION AS NEEDED
Status: DISCONTINUED | OUTPATIENT
Start: 2019-06-03 | End: 2019-06-09 | Stop reason: HOSPADM

## 2019-06-03 RX ORDER — SODIUM CHLORIDE 0.9 % (FLUSH) 0.9 %
10 SYRINGE (ML) INJECTION EVERY 12 HOURS SCHEDULED
Status: DISCONTINUED | OUTPATIENT
Start: 2019-06-03 | End: 2019-06-09 | Stop reason: HOSPADM

## 2019-06-03 RX ORDER — FAMOTIDINE 10 MG/ML
20 INJECTION, SOLUTION INTRAVENOUS EVERY 12 HOURS SCHEDULED
Status: DISCONTINUED | OUTPATIENT
Start: 2019-06-03 | End: 2019-06-09 | Stop reason: HOSPADM

## 2019-06-03 RX ADMIN — SODIUM CHLORIDE, PRESERVATIVE FREE 10 ML: 5 INJECTION INTRAVENOUS at 10:36

## 2019-06-03 RX ADMIN — FAMOTIDINE 20 MG: 10 INJECTION INTRAVENOUS at 08:06

## 2019-06-03 RX ADMIN — CALCIUM GLUCONATE: 94 INJECTION, SOLUTION INTRAVENOUS at 17:51

## 2019-06-03 RX ADMIN — POTASSIUM PHOSPHATE, MONOBASIC AND POTASSIUM PHOSPHATE, DIBASIC 20 MMOL: 224; 236 INJECTION, SOLUTION, CONCENTRATE INTRAVENOUS at 13:40

## 2019-06-03 RX ADMIN — ENOXAPARIN SODIUM 40 MG: 40 INJECTION SUBCUTANEOUS at 10:35

## 2019-06-03 RX ADMIN — POTASSIUM CHLORIDE, DEXTROSE MONOHYDRATE AND SODIUM CHLORIDE 60 ML/HR: 150; 5; 450 INJECTION, SOLUTION INTRAVENOUS at 13:41

## 2019-06-03 RX ADMIN — FAMOTIDINE 20 MG: 10 INJECTION INTRAVENOUS at 20:33

## 2019-06-03 RX ADMIN — HYDROMORPHONE HYDROCHLORIDE 0.5 MG: 1 INJECTION, SOLUTION INTRAMUSCULAR; INTRAVENOUS; SUBCUTANEOUS at 17:51

## 2019-06-03 RX ADMIN — TAZOBACTAM SODIUM AND PIPERACILLIN SODIUM 3.38 G: 375; 3 INJECTION, SOLUTION INTRAVENOUS at 15:21

## 2019-06-03 RX ADMIN — HYDROMORPHONE HYDROCHLORIDE 0.5 MG: 1 INJECTION, SOLUTION INTRAMUSCULAR; INTRAVENOUS; SUBCUTANEOUS at 20:33

## 2019-06-03 RX ADMIN — TAZOBACTAM SODIUM AND PIPERACILLIN SODIUM 3.38 G: 375; 3 INJECTION, SOLUTION INTRAVENOUS at 08:04

## 2019-06-04 LAB
ABO + RH BLD: NORMAL
ABO + RH BLD: NORMAL
ALBUMIN SERPL-MCNC: 2.2 G/DL (ref 3.5–5.2)
ALBUMIN/GLOB SERPL: 0.8 G/DL
ALP SERPL-CCNC: 99 U/L (ref 39–117)
ALT SERPL W P-5'-P-CCNC: 15 U/L (ref 1–33)
ANION GAP SERPL CALCULATED.3IONS-SCNC: 12.5 MMOL/L
AST SERPL-CCNC: 10 U/L (ref 1–32)
BH BB BLOOD EXPIRATION DATE: NORMAL
BH BB BLOOD EXPIRATION DATE: NORMAL
BH BB BLOOD TYPE BARCODE: 5100
BH BB BLOOD TYPE BARCODE: 5100
BH BB DISPENSE STATUS: NORMAL
BH BB DISPENSE STATUS: NORMAL
BH BB PRODUCT CODE: NORMAL
BH BB PRODUCT CODE: NORMAL
BH BB UNIT NUMBER: NORMAL
BH BB UNIT NUMBER: NORMAL
BILIRUB SERPL-MCNC: 0.6 MG/DL (ref 0.2–1.2)
BUN BLD-MCNC: 11 MG/DL (ref 8–23)
BUN/CREAT SERPL: 20 (ref 7–25)
CALCIUM SPEC-SCNC: 7.4 MG/DL (ref 8.6–10.5)
CHLORIDE SERPL-SCNC: 100 MMOL/L (ref 98–107)
CO2 SERPL-SCNC: 27.5 MMOL/L (ref 22–29)
CREAT BLD-MCNC: 0.55 MG/DL (ref 0.57–1)
DEPRECATED RDW RBC AUTO: 44.1 FL (ref 37–54)
ERYTHROCYTE [DISTWIDTH] IN BLOOD BY AUTOMATED COUNT: 13.9 % (ref 12.3–15.4)
GFR SERPL CREATININE-BSD FRML MDRD: 106 ML/MIN/1.73
GLOBULIN UR ELPH-MCNC: 2.8 GM/DL
GLUCOSE BLD-MCNC: 112 MG/DL (ref 65–99)
GLUCOSE BLDC GLUCOMTR-MCNC: 108 MG/DL (ref 70–130)
GLUCOSE BLDC GLUCOMTR-MCNC: 111 MG/DL (ref 70–130)
GLUCOSE BLDC GLUCOMTR-MCNC: 119 MG/DL (ref 70–130)
GLUCOSE BLDC GLUCOMTR-MCNC: 123 MG/DL (ref 70–130)
HCT VFR BLD AUTO: 27.6 % (ref 34–46.6)
HGB BLD-MCNC: 9.1 G/DL (ref 12–15.9)
MAGNESIUM SERPL-MCNC: 1.7 MG/DL (ref 1.6–2.4)
MCH RBC QN AUTO: 28.8 PG (ref 26.6–33)
MCHC RBC AUTO-ENTMCNC: 33 G/DL (ref 31.5–35.7)
MCV RBC AUTO: 87.3 FL (ref 79–97)
PHOSPHATE SERPL-MCNC: 2.3 MG/DL (ref 2.5–4.5)
PLATELET # BLD AUTO: 122 10*3/MM3 (ref 140–450)
PMV BLD AUTO: 10.2 FL (ref 6–12)
POTASSIUM BLD-SCNC: 3.1 MMOL/L (ref 3.5–5.2)
PROT SERPL-MCNC: 5 G/DL (ref 6–8.5)
RBC # BLD AUTO: 3.16 10*6/MM3 (ref 3.77–5.28)
SODIUM BLD-SCNC: 140 MMOL/L (ref 136–145)
UNIT  ABO: NORMAL
UNIT  ABO: NORMAL
UNIT  RH: NORMAL
UNIT  RH: NORMAL
WBC NRBC COR # BLD: 12.94 10*3/MM3 (ref 3.4–10.8)

## 2019-06-04 PROCEDURE — 25010000002 ENOXAPARIN PER 10 MG: Performed by: SURGERY

## 2019-06-04 PROCEDURE — 85027 COMPLETE CBC AUTOMATED: CPT | Performed by: INTERNAL MEDICINE

## 2019-06-04 PROCEDURE — 83735 ASSAY OF MAGNESIUM: CPT | Performed by: SURGERY

## 2019-06-04 PROCEDURE — 99024 POSTOP FOLLOW-UP VISIT: CPT | Performed by: SURGERY

## 2019-06-04 PROCEDURE — 82962 GLUCOSE BLOOD TEST: CPT

## 2019-06-04 PROCEDURE — 25010000002 MAGNESIUM SULFATE PER 500 MG OF MAGNESIUM: Performed by: SURGERY

## 2019-06-04 PROCEDURE — 25010000002 POTASSIUM CHLORIDE PER 2 MEQ OF POTASSIUM: Performed by: SURGERY

## 2019-06-04 PROCEDURE — 25010000002 HYDROMORPHONE PER 4 MG: Performed by: SURGERY

## 2019-06-04 PROCEDURE — 25010000002 CALCIUM GLUCONATE PER 10 ML: Performed by: SURGERY

## 2019-06-04 PROCEDURE — 25010000002 PIPERACILLIN SOD-TAZOBACTAM PER 1 G: Performed by: SURGERY

## 2019-06-04 PROCEDURE — 80053 COMPREHEN METABOLIC PANEL: CPT | Performed by: SURGERY

## 2019-06-04 PROCEDURE — 84100 ASSAY OF PHOSPHORUS: CPT | Performed by: SURGERY

## 2019-06-04 PROCEDURE — 25010000002 FUROSEMIDE PER 20 MG: Performed by: INTERNAL MEDICINE

## 2019-06-04 RX ORDER — FUROSEMIDE 10 MG/ML
20 INJECTION INTRAMUSCULAR; INTRAVENOUS ONCE
Status: COMPLETED | OUTPATIENT
Start: 2019-06-04 | End: 2019-06-04

## 2019-06-04 RX ADMIN — I.V. FAT EMULSION 20 G: 20 EMULSION INTRAVENOUS at 16:56

## 2019-06-04 RX ADMIN — TAZOBACTAM SODIUM AND PIPERACILLIN SODIUM 3.38 G: 375; 3 INJECTION, SOLUTION INTRAVENOUS at 00:20

## 2019-06-04 RX ADMIN — TAZOBACTAM SODIUM AND PIPERACILLIN SODIUM 3.38 G: 375; 3 INJECTION, SOLUTION INTRAVENOUS at 09:18

## 2019-06-04 RX ADMIN — FAMOTIDINE 20 MG: 10 INJECTION INTRAVENOUS at 13:21

## 2019-06-04 RX ADMIN — HYDROMORPHONE HYDROCHLORIDE 0.5 MG: 1 INJECTION, SOLUTION INTRAMUSCULAR; INTRAVENOUS; SUBCUTANEOUS at 20:28

## 2019-06-04 RX ADMIN — TAZOBACTAM SODIUM AND PIPERACILLIN SODIUM 3.38 G: 375; 3 INJECTION, SOLUTION INTRAVENOUS at 16:46

## 2019-06-04 RX ADMIN — POTASSIUM CHLORIDE: 2 INJECTION, SOLUTION, CONCENTRATE INTRAVENOUS at 16:55

## 2019-06-04 RX ADMIN — ENOXAPARIN SODIUM 40 MG: 40 INJECTION SUBCUTANEOUS at 11:53

## 2019-06-04 RX ADMIN — FUROSEMIDE 20 MG: 10 INJECTION, SOLUTION INTRAMUSCULAR; INTRAVENOUS at 00:20

## 2019-06-04 RX ADMIN — SODIUM CHLORIDE, PRESERVATIVE FREE 10 ML: 5 INJECTION INTRAVENOUS at 20:29

## 2019-06-04 RX ADMIN — FAMOTIDINE 20 MG: 10 INJECTION INTRAVENOUS at 20:28

## 2019-06-04 RX ADMIN — SODIUM CHLORIDE, PRESERVATIVE FREE 10 ML: 5 INJECTION INTRAVENOUS at 20:30

## 2019-06-05 LAB
ANION GAP SERPL CALCULATED.3IONS-SCNC: 9.4 MMOL/L
BACTERIA SPEC AEROBE CULT: NORMAL
BACTERIA SPEC AEROBE CULT: NORMAL
BASOPHILS # BLD AUTO: 0.02 10*3/MM3 (ref 0–0.2)
BASOPHILS NFR BLD AUTO: 0.2 % (ref 0–1.5)
BUN BLD-MCNC: 15 MG/DL (ref 8–23)
BUN/CREAT SERPL: 28.3 (ref 7–25)
CALCIUM SPEC-SCNC: 7.8 MG/DL (ref 8.6–10.5)
CHLORIDE SERPL-SCNC: 99 MMOL/L (ref 98–107)
CO2 SERPL-SCNC: 28.6 MMOL/L (ref 22–29)
CREAT BLD-MCNC: 0.53 MG/DL (ref 0.57–1)
DEPRECATED RDW RBC AUTO: 57.3 FL (ref 37–54)
EOSINOPHIL # BLD AUTO: 0.27 10*3/MM3 (ref 0–0.4)
EOSINOPHIL NFR BLD AUTO: 2.7 % (ref 0.3–6.2)
ERYTHROCYTE [DISTWIDTH] IN BLOOD BY AUTOMATED COUNT: 14.8 % (ref 12.3–15.4)
GFR SERPL CREATININE-BSD FRML MDRD: 110 ML/MIN/1.73
GLUCOSE BLD-MCNC: 126 MG/DL (ref 65–99)
GLUCOSE BLDC GLUCOMTR-MCNC: 119 MG/DL (ref 70–130)
GLUCOSE BLDC GLUCOMTR-MCNC: 121 MG/DL (ref 70–130)
GLUCOSE BLDC GLUCOMTR-MCNC: 126 MG/DL (ref 70–130)
GLUCOSE BLDC GLUCOMTR-MCNC: 153 MG/DL (ref 70–130)
HCT VFR BLD AUTO: 29.7 % (ref 34–46.6)
HGB BLD-MCNC: 8.3 G/DL (ref 12–15.9)
IMM GRANULOCYTES # BLD AUTO: 0.18 10*3/MM3 (ref 0–0.05)
IMM GRANULOCYTES NFR BLD AUTO: 1.8 % (ref 0–0.5)
LYMPHOCYTES # BLD AUTO: 0.94 10*3/MM3 (ref 0.7–3.1)
LYMPHOCYTES NFR BLD AUTO: 9.4 % (ref 19.6–45.3)
MAGNESIUM SERPL-MCNC: 1.8 MG/DL (ref 1.6–2.4)
MCH RBC QN AUTO: 29.3 PG (ref 26.6–33)
MCHC RBC AUTO-ENTMCNC: 27.9 G/DL (ref 31.5–35.7)
MCV RBC AUTO: 104.9 FL (ref 79–97)
MONOCYTES # BLD AUTO: 0.93 10*3/MM3 (ref 0.1–0.9)
MONOCYTES NFR BLD AUTO: 9.3 % (ref 5–12)
NEUTROPHILS # BLD AUTO: 7.7 10*3/MM3 (ref 1.7–7)
NEUTROPHILS NFR BLD AUTO: 76.6 % (ref 42.7–76)
NRBC BLD AUTO-RTO: 0.1 /100 WBC (ref 0–0.2)
PHOSPHATE SERPL-MCNC: 2.6 MG/DL (ref 2.5–4.5)
PLATELET # BLD AUTO: 136 10*3/MM3 (ref 140–450)
PMV BLD AUTO: 11.1 FL (ref 6–12)
POTASSIUM BLD-SCNC: 3.9 MMOL/L (ref 3.5–5.2)
RBC # BLD AUTO: 2.83 10*6/MM3 (ref 3.77–5.28)
SODIUM BLD-SCNC: 137 MMOL/L (ref 136–145)
WBC NRBC COR # BLD: 10.04 10*3/MM3 (ref 3.4–10.8)

## 2019-06-05 PROCEDURE — 97161 PT EVAL LOW COMPLEX 20 MIN: CPT

## 2019-06-05 PROCEDURE — 83735 ASSAY OF MAGNESIUM: CPT | Performed by: SURGERY

## 2019-06-05 PROCEDURE — 25010000002 PIPERACILLIN SOD-TAZOBACTAM PER 1 G: Performed by: SURGERY

## 2019-06-05 PROCEDURE — 82962 GLUCOSE BLOOD TEST: CPT

## 2019-06-05 PROCEDURE — 84100 ASSAY OF PHOSPHORUS: CPT | Performed by: SURGERY

## 2019-06-05 PROCEDURE — 85025 COMPLETE CBC W/AUTO DIFF WBC: CPT | Performed by: SURGERY

## 2019-06-05 PROCEDURE — 25010000002 MAGNESIUM SULFATE PER 500 MG OF MAGNESIUM: Performed by: SURGERY

## 2019-06-05 PROCEDURE — 63710000001 INSULIN LISPRO (HUMAN) PER 5 UNITS: Performed by: INTERNAL MEDICINE

## 2019-06-05 PROCEDURE — 25010000002 POTASSIUM CHLORIDE PER 2 MEQ OF POTASSIUM: Performed by: SURGERY

## 2019-06-05 PROCEDURE — 25010000002 CALCIUM GLUCONATE PER 10 ML: Performed by: SURGERY

## 2019-06-05 PROCEDURE — 25010000002 ENOXAPARIN PER 10 MG: Performed by: SURGERY

## 2019-06-05 PROCEDURE — 80048 BASIC METABOLIC PNL TOTAL CA: CPT | Performed by: SURGERY

## 2019-06-05 PROCEDURE — 99024 POSTOP FOLLOW-UP VISIT: CPT | Performed by: SURGERY

## 2019-06-05 PROCEDURE — 97110 THERAPEUTIC EXERCISES: CPT

## 2019-06-05 RX ADMIN — SODIUM CHLORIDE, PRESERVATIVE FREE 10 ML: 5 INJECTION INTRAVENOUS at 19:35

## 2019-06-05 RX ADMIN — TAZOBACTAM SODIUM AND PIPERACILLIN SODIUM 3.38 G: 375; 3 INJECTION, SOLUTION INTRAVENOUS at 08:12

## 2019-06-05 RX ADMIN — POTASSIUM CHLORIDE: 2 INJECTION, SOLUTION, CONCENTRATE INTRAVENOUS at 18:19

## 2019-06-05 RX ADMIN — SODIUM CHLORIDE, PRESERVATIVE FREE 10 ML: 5 INJECTION INTRAVENOUS at 10:05

## 2019-06-05 RX ADMIN — SODIUM CHLORIDE, PRESERVATIVE FREE 20 ML: 5 INJECTION INTRAVENOUS at 08:13

## 2019-06-05 RX ADMIN — TAZOBACTAM SODIUM AND PIPERACILLIN SODIUM 3.38 G: 375; 3 INJECTION, SOLUTION INTRAVENOUS at 00:39

## 2019-06-05 RX ADMIN — INSULIN LISPRO 2 UNITS: 100 INJECTION, SOLUTION INTRAVENOUS; SUBCUTANEOUS at 12:23

## 2019-06-05 RX ADMIN — I.V. FAT EMULSION 20 G: 20 EMULSION INTRAVENOUS at 18:18

## 2019-06-05 RX ADMIN — FAMOTIDINE 20 MG: 10 INJECTION INTRAVENOUS at 19:33

## 2019-06-05 RX ADMIN — TAZOBACTAM SODIUM AND PIPERACILLIN SODIUM 3.38 G: 375; 3 INJECTION, SOLUTION INTRAVENOUS at 23:56

## 2019-06-05 RX ADMIN — FAMOTIDINE 20 MG: 10 INJECTION INTRAVENOUS at 08:12

## 2019-06-05 RX ADMIN — TAZOBACTAM SODIUM AND PIPERACILLIN SODIUM 3.38 G: 375; 3 INJECTION, SOLUTION INTRAVENOUS at 16:36

## 2019-06-05 RX ADMIN — ENOXAPARIN SODIUM 40 MG: 40 INJECTION SUBCUTANEOUS at 12:23

## 2019-06-06 LAB
ANION GAP SERPL CALCULATED.3IONS-SCNC: 12.7 MMOL/L
BASE EXCESS BLDA CALC-SCNC: 1 MMOL/L (ref -5–5)
BASOPHILS # BLD AUTO: 0.02 10*3/MM3 (ref 0–0.2)
BASOPHILS NFR BLD AUTO: 0.2 % (ref 0–1.5)
BUN BLD-MCNC: 14 MG/DL (ref 8–23)
BUN/CREAT SERPL: 32.6 (ref 7–25)
CALCIUM SPEC-SCNC: 8 MG/DL (ref 8.6–10.5)
CHLORIDE SERPL-SCNC: 101 MMOL/L (ref 98–107)
CO2 BLDA-SCNC: 27 MMOL/L (ref 24–29)
CO2 SERPL-SCNC: 25.3 MMOL/L (ref 22–29)
CREAT BLD-MCNC: 0.43 MG/DL (ref 0.57–1)
DEPRECATED RDW RBC AUTO: 45 FL (ref 37–54)
EOSINOPHIL # BLD AUTO: 0.29 10*3/MM3 (ref 0–0.4)
EOSINOPHIL NFR BLD AUTO: 2.6 % (ref 0.3–6.2)
ERYTHROCYTE [DISTWIDTH] IN BLOOD BY AUTOMATED COUNT: 13.7 % (ref 12.3–15.4)
GFR SERPL CREATININE-BSD FRML MDRD: 141 ML/MIN/1.73
GLUCOSE BLD-MCNC: 101 MG/DL (ref 65–99)
GLUCOSE BLDC GLUCOMTR-MCNC: 110 MG/DL (ref 70–130)
GLUCOSE BLDC GLUCOMTR-MCNC: 119 MG/DL (ref 70–130)
GLUCOSE BLDC GLUCOMTR-MCNC: 122 MG/DL (ref 70–130)
GLUCOSE BLDC GLUCOMTR-MCNC: 126 MG/DL (ref 70–130)
GLUCOSE BLDC GLUCOMTR-MCNC: 127 MG/DL (ref 70–130)
GLUCOSE BLDC GLUCOMTR-MCNC: 133 MG/DL (ref 70–130)
HCO3 BLDA-SCNC: 25.5 MMOL/L (ref 22–26)
HCT VFR BLD AUTO: 27.2 % (ref 34–46.6)
HCT VFR BLDA CALC: 20 % (ref 38–51)
HGB BLD-MCNC: 8.8 G/DL (ref 12–15.9)
HGB BLDA-MCNC: 6.8 G/DL (ref 12–17)
IMM GRANULOCYTES # BLD AUTO: 0.48 10*3/MM3 (ref 0–0.05)
IMM GRANULOCYTES NFR BLD AUTO: 4.3 % (ref 0–0.5)
LYMPHOCYTES # BLD AUTO: 1.07 10*3/MM3 (ref 0.7–3.1)
LYMPHOCYTES NFR BLD AUTO: 9.6 % (ref 19.6–45.3)
MAGNESIUM SERPL-MCNC: 2.1 MG/DL (ref 1.6–2.4)
MCH RBC QN AUTO: 28.9 PG (ref 26.6–33)
MCHC RBC AUTO-ENTMCNC: 32.4 G/DL (ref 31.5–35.7)
MCV RBC AUTO: 89.2 FL (ref 79–97)
MONOCYTES # BLD AUTO: 1.15 10*3/MM3 (ref 0.1–0.9)
MONOCYTES NFR BLD AUTO: 10.3 % (ref 5–12)
NEUTROPHILS # BLD AUTO: 8.12 10*3/MM3 (ref 1.7–7)
NEUTROPHILS NFR BLD AUTO: 73 % (ref 42.7–76)
NRBC BLD AUTO-RTO: 0.1 /100 WBC (ref 0–0.2)
PCO2 BLDA: 38.9 MM HG (ref 35–45)
PH BLDA: 7.42 PH UNITS (ref 7.35–7.6)
PHOSPHATE SERPL-MCNC: 3 MG/DL (ref 2.5–4.5)
PLATELET # BLD AUTO: 189 10*3/MM3 (ref 140–450)
PMV BLD AUTO: 10.6 FL (ref 6–12)
PO2 BLDA: 467 MMHG (ref 80–105)
POTASSIUM BLD-SCNC: 3.6 MMOL/L (ref 3.5–5.2)
POTASSIUM BLDA-SCNC: 3 MMOL/L (ref 3.5–4.9)
RBC # BLD AUTO: 3.05 10*6/MM3 (ref 3.77–5.28)
SAO2 % BLDA: 100 % (ref 95–98)
SODIUM BLD-SCNC: 139 MMOL/L (ref 136–145)
WBC NRBC COR # BLD: 11.13 10*3/MM3 (ref 3.4–10.8)

## 2019-06-06 PROCEDURE — 84100 ASSAY OF PHOSPHORUS: CPT | Performed by: SURGERY

## 2019-06-06 PROCEDURE — 25010000002 MAGNESIUM SULFATE PER 500 MG OF MAGNESIUM: Performed by: SURGERY

## 2019-06-06 PROCEDURE — 25010000002 CALCIUM GLUCONATE PER 10 ML: Performed by: SURGERY

## 2019-06-06 PROCEDURE — 25010000002 ENOXAPARIN PER 10 MG: Performed by: SURGERY

## 2019-06-06 PROCEDURE — 85025 COMPLETE CBC W/AUTO DIFF WBC: CPT | Performed by: SURGERY

## 2019-06-06 PROCEDURE — 25010000002 HYDROMORPHONE PER 4 MG: Performed by: SURGERY

## 2019-06-06 PROCEDURE — 80048 BASIC METABOLIC PNL TOTAL CA: CPT | Performed by: SURGERY

## 2019-06-06 PROCEDURE — 82962 GLUCOSE BLOOD TEST: CPT

## 2019-06-06 PROCEDURE — 25010000002 PIPERACILLIN SOD-TAZOBACTAM PER 1 G: Performed by: SURGERY

## 2019-06-06 PROCEDURE — 97110 THERAPEUTIC EXERCISES: CPT

## 2019-06-06 PROCEDURE — 99024 POSTOP FOLLOW-UP VISIT: CPT | Performed by: SURGERY

## 2019-06-06 PROCEDURE — 83735 ASSAY OF MAGNESIUM: CPT | Performed by: SURGERY

## 2019-06-06 PROCEDURE — 25010000002 POTASSIUM CHLORIDE PER 2 MEQ OF POTASSIUM: Performed by: SURGERY

## 2019-06-06 RX ADMIN — I.V. FAT EMULSION 20 G: 20 EMULSION INTRAVENOUS at 18:01

## 2019-06-06 RX ADMIN — TAZOBACTAM SODIUM AND PIPERACILLIN SODIUM 3.38 G: 375; 3 INJECTION, SOLUTION INTRAVENOUS at 16:54

## 2019-06-06 RX ADMIN — SODIUM CHLORIDE, PRESERVATIVE FREE 10 ML: 5 INJECTION INTRAVENOUS at 21:24

## 2019-06-06 RX ADMIN — FAMOTIDINE 20 MG: 10 INJECTION INTRAVENOUS at 09:32

## 2019-06-06 RX ADMIN — CALCIUM GLUCONATE: 94 INJECTION, SOLUTION INTRAVENOUS at 18:01

## 2019-06-06 RX ADMIN — HYDROMORPHONE HYDROCHLORIDE 0.5 MG: 1 INJECTION, SOLUTION INTRAMUSCULAR; INTRAVENOUS; SUBCUTANEOUS at 21:23

## 2019-06-06 RX ADMIN — FAMOTIDINE 20 MG: 10 INJECTION INTRAVENOUS at 21:23

## 2019-06-06 RX ADMIN — SODIUM CHLORIDE, PRESERVATIVE FREE 10 ML: 5 INJECTION INTRAVENOUS at 09:32

## 2019-06-06 RX ADMIN — ENOXAPARIN SODIUM 40 MG: 40 INJECTION SUBCUTANEOUS at 11:41

## 2019-06-06 RX ADMIN — TAZOBACTAM SODIUM AND PIPERACILLIN SODIUM 3.38 G: 375; 3 INJECTION, SOLUTION INTRAVENOUS at 09:31

## 2019-06-07 LAB
ALBUMIN SERPL-MCNC: 2.3 G/DL (ref 3.5–5.2)
ALBUMIN/GLOB SERPL: 0.8 G/DL
ALP SERPL-CCNC: 171 U/L (ref 39–117)
ALT SERPL W P-5'-P-CCNC: 40 U/L (ref 1–33)
ANION GAP SERPL CALCULATED.3IONS-SCNC: 11.3 MMOL/L
AST SERPL-CCNC: 37 U/L (ref 1–32)
BILIRUB SERPL-MCNC: 0.3 MG/DL (ref 0.2–1.2)
BUN BLD-MCNC: 18 MG/DL (ref 8–23)
BUN/CREAT SERPL: 37.5 (ref 7–25)
CALCIUM SPEC-SCNC: 7.6 MG/DL (ref 8.6–10.5)
CHLORIDE SERPL-SCNC: 104 MMOL/L (ref 98–107)
CO2 SERPL-SCNC: 24.7 MMOL/L (ref 22–29)
CREAT BLD-MCNC: 0.48 MG/DL (ref 0.57–1)
GFR SERPL CREATININE-BSD FRML MDRD: 124 ML/MIN/1.73
GLOBULIN UR ELPH-MCNC: 2.9 GM/DL
GLUCOSE BLD-MCNC: 124 MG/DL (ref 65–99)
GLUCOSE BLDC GLUCOMTR-MCNC: 109 MG/DL (ref 70–130)
GLUCOSE BLDC GLUCOMTR-MCNC: 125 MG/DL (ref 70–130)
GLUCOSE BLDC GLUCOMTR-MCNC: 135 MG/DL (ref 70–130)
MAGNESIUM SERPL-MCNC: 2 MG/DL (ref 1.6–2.4)
PHOSPHATE SERPL-MCNC: 2.8 MG/DL (ref 2.5–4.5)
POTASSIUM BLD-SCNC: 3.7 MMOL/L (ref 3.5–5.2)
PROT SERPL-MCNC: 5.2 G/DL (ref 6–8.5)
SODIUM BLD-SCNC: 140 MMOL/L (ref 136–145)

## 2019-06-07 PROCEDURE — 82962 GLUCOSE BLOOD TEST: CPT

## 2019-06-07 PROCEDURE — 84100 ASSAY OF PHOSPHORUS: CPT | Performed by: SURGERY

## 2019-06-07 PROCEDURE — 25010000002 ENOXAPARIN PER 10 MG: Performed by: SURGERY

## 2019-06-07 PROCEDURE — 80053 COMPREHEN METABOLIC PANEL: CPT | Performed by: SURGERY

## 2019-06-07 PROCEDURE — 25010000002 PIPERACILLIN SOD-TAZOBACTAM PER 1 G: Performed by: SURGERY

## 2019-06-07 PROCEDURE — 99024 POSTOP FOLLOW-UP VISIT: CPT | Performed by: SURGERY

## 2019-06-07 PROCEDURE — 25010000002 HYDROMORPHONE PER 4 MG: Performed by: SURGERY

## 2019-06-07 PROCEDURE — 83735 ASSAY OF MAGNESIUM: CPT | Performed by: SURGERY

## 2019-06-07 PROCEDURE — 97110 THERAPEUTIC EXERCISES: CPT

## 2019-06-07 RX ORDER — OXYCODONE HYDROCHLORIDE AND ACETAMINOPHEN 5; 325 MG/1; MG/1
1 TABLET ORAL EVERY 4 HOURS PRN
Status: DISCONTINUED | OUTPATIENT
Start: 2019-06-07 | End: 2019-06-09 | Stop reason: HOSPADM

## 2019-06-07 RX ADMIN — FAMOTIDINE 20 MG: 10 INJECTION INTRAVENOUS at 20:26

## 2019-06-07 RX ADMIN — TAZOBACTAM SODIUM AND PIPERACILLIN SODIUM 3.38 G: 375; 3 INJECTION, SOLUTION INTRAVENOUS at 00:27

## 2019-06-07 RX ADMIN — SODIUM CHLORIDE, PRESERVATIVE FREE 10 ML: 5 INJECTION INTRAVENOUS at 08:37

## 2019-06-07 RX ADMIN — HYDROMORPHONE HYDROCHLORIDE 0.5 MG: 1 INJECTION, SOLUTION INTRAMUSCULAR; INTRAVENOUS; SUBCUTANEOUS at 02:14

## 2019-06-07 RX ADMIN — ENOXAPARIN SODIUM 40 MG: 40 INJECTION SUBCUTANEOUS at 11:57

## 2019-06-07 RX ADMIN — FAMOTIDINE 20 MG: 10 INJECTION INTRAVENOUS at 08:37

## 2019-06-07 RX ADMIN — SODIUM CHLORIDE, PRESERVATIVE FREE 10 ML: 5 INJECTION INTRAVENOUS at 20:26

## 2019-06-07 RX ADMIN — OXYCODONE AND ACETAMINOPHEN 1 TABLET: 5; 325 TABLET ORAL at 20:26

## 2019-06-08 LAB
BASOPHILS # BLD AUTO: 0.04 10*3/MM3 (ref 0–0.2)
BASOPHILS NFR BLD AUTO: 0.3 % (ref 0–1.5)
DEPRECATED RDW RBC AUTO: 45.6 FL (ref 37–54)
EOSINOPHIL # BLD AUTO: 0.3 10*3/MM3 (ref 0–0.4)
EOSINOPHIL NFR BLD AUTO: 2.6 % (ref 0.3–6.2)
ERYTHROCYTE [DISTWIDTH] IN BLOOD BY AUTOMATED COUNT: 14 % (ref 12.3–15.4)
HCT VFR BLD AUTO: 26.5 % (ref 34–46.6)
HGB BLD-MCNC: 8.3 G/DL (ref 12–15.9)
IMM GRANULOCYTES # BLD AUTO: 0.36 10*3/MM3 (ref 0–0.05)
IMM GRANULOCYTES NFR BLD AUTO: 3.1 % (ref 0–0.5)
LYMPHOCYTES # BLD AUTO: 1.44 10*3/MM3 (ref 0.7–3.1)
LYMPHOCYTES NFR BLD AUTO: 12.3 % (ref 19.6–45.3)
MCH RBC QN AUTO: 28 PG (ref 26.6–33)
MCHC RBC AUTO-ENTMCNC: 31.3 G/DL (ref 31.5–35.7)
MCV RBC AUTO: 89.5 FL (ref 79–97)
MONOCYTES # BLD AUTO: 0.74 10*3/MM3 (ref 0.1–0.9)
MONOCYTES NFR BLD AUTO: 6.3 % (ref 5–12)
NEUTROPHILS # BLD AUTO: 8.87 10*3/MM3 (ref 1.7–7)
NEUTROPHILS NFR BLD AUTO: 75.4 % (ref 42.7–76)
NRBC BLD AUTO-RTO: 0 /100 WBC (ref 0–0.2)
PLATELET # BLD AUTO: 252 10*3/MM3 (ref 140–450)
PMV BLD AUTO: 10.4 FL (ref 6–12)
RBC # BLD AUTO: 2.96 10*6/MM3 (ref 3.77–5.28)
WBC NRBC COR # BLD: 11.75 10*3/MM3 (ref 3.4–10.8)

## 2019-06-08 PROCEDURE — 99024 POSTOP FOLLOW-UP VISIT: CPT | Performed by: SURGERY

## 2019-06-08 PROCEDURE — 25010000002 ENOXAPARIN PER 10 MG: Performed by: SURGERY

## 2019-06-08 PROCEDURE — 85025 COMPLETE CBC W/AUTO DIFF WBC: CPT | Performed by: SURGERY

## 2019-06-08 RX ADMIN — SODIUM CHLORIDE, PRESERVATIVE FREE 10 ML: 5 INJECTION INTRAVENOUS at 20:18

## 2019-06-08 RX ADMIN — SODIUM CHLORIDE, PRESERVATIVE FREE 20 ML: 5 INJECTION INTRAVENOUS at 08:45

## 2019-06-08 RX ADMIN — OXYCODONE AND ACETAMINOPHEN 1 TABLET: 5; 325 TABLET ORAL at 20:17

## 2019-06-08 RX ADMIN — FAMOTIDINE 20 MG: 10 INJECTION INTRAVENOUS at 08:41

## 2019-06-08 RX ADMIN — FAMOTIDINE 20 MG: 10 INJECTION INTRAVENOUS at 20:17

## 2019-06-08 RX ADMIN — ENOXAPARIN SODIUM 40 MG: 40 INJECTION SUBCUTANEOUS at 12:54

## 2019-06-08 RX ADMIN — SODIUM CHLORIDE, PRESERVATIVE FREE 10 ML: 5 INJECTION INTRAVENOUS at 20:17

## 2019-06-09 VITALS
BODY MASS INDEX: 25.45 KG/M2 | HEIGHT: 65 IN | RESPIRATION RATE: 16 BRPM | DIASTOLIC BLOOD PRESSURE: 79 MMHG | OXYGEN SATURATION: 95 % | WEIGHT: 152.78 LBS | SYSTOLIC BLOOD PRESSURE: 132 MMHG | TEMPERATURE: 98.3 F | HEART RATE: 98 BPM

## 2019-06-09 PROCEDURE — 25010000002 ENOXAPARIN PER 10 MG: Performed by: SURGERY

## 2019-06-09 PROCEDURE — 97110 THERAPEUTIC EXERCISES: CPT

## 2019-06-09 RX ORDER — OXYCODONE HYDROCHLORIDE AND ACETAMINOPHEN 5; 325 MG/1; MG/1
1 TABLET ORAL EVERY 4 HOURS PRN
Qty: 12 TABLET | Refills: 0 | Status: SHIPPED | OUTPATIENT
Start: 2019-06-09 | End: 2019-12-02

## 2019-06-09 RX ADMIN — SODIUM CHLORIDE, PRESERVATIVE FREE 10 ML: 5 INJECTION INTRAVENOUS at 10:12

## 2019-06-09 RX ADMIN — SODIUM CHLORIDE, PRESERVATIVE FREE 10 ML: 5 INJECTION INTRAVENOUS at 10:13

## 2019-06-09 RX ADMIN — FAMOTIDINE 20 MG: 10 INJECTION INTRAVENOUS at 10:11

## 2019-06-09 RX ADMIN — SODIUM CHLORIDE, PRESERVATIVE FREE 20 ML: 5 INJECTION INTRAVENOUS at 10:12

## 2019-06-09 RX ADMIN — OXYCODONE AND ACETAMINOPHEN 1 TABLET: 5; 325 TABLET ORAL at 12:40

## 2019-06-10 ENCOUNTER — EPISODE CHANGES (OUTPATIENT)
Dept: CASE MANAGEMENT | Facility: OTHER | Age: 84
End: 2019-06-10

## 2019-06-13 ENCOUNTER — EPISODE CHANGES (OUTPATIENT)
Dept: CASE MANAGEMENT | Facility: OTHER | Age: 84
End: 2019-06-13

## 2019-06-17 ENCOUNTER — OFFICE VISIT (OUTPATIENT)
Dept: SURGERY | Facility: CLINIC | Age: 84
End: 2019-06-17

## 2019-06-17 DIAGNOSIS — K56.609 SBO (SMALL BOWEL OBSTRUCTION) (HCC): Primary | ICD-10-CM

## 2019-06-17 PROCEDURE — 99024 POSTOP FOLLOW-UP VISIT: CPT | Performed by: SURGERY

## 2019-06-18 NOTE — PROGRESS NOTES
Follow-up small bowel resection    Subjective:  Gradually improving.  Appetite is poor but strength is getting better.  Bowels are functioning fairly well.    Objective:  Abdomen is soft and benign, incisions look good    Assessment and plan:  -Small bowel resection secondary to strangulated internal hernia, finally recovering well  -Patient is still at rehab center and seems to be gradually improving  -I assured her that her appetite and taste would gradually improve  -She will follow-up in 4 weeks time  -Staples were removed today    Silvano Alarcon MD  General and Endoscopic Surgery  [facility]    [address] [place] [address], [address]  [place], [place], [address]  P: [phone]  F: [phone]

## 2019-06-20 ENCOUNTER — EPISODE CHANGES (OUTPATIENT)
Dept: CASE MANAGEMENT | Facility: OTHER | Age: 84
End: 2019-06-20

## 2019-06-26 DIAGNOSIS — F41.8 DEPRESSION WITH ANXIETY: ICD-10-CM

## 2019-06-26 RX ORDER — ESCITALOPRAM OXALATE 10 MG/1
TABLET ORAL
Qty: 90 TABLET | Refills: 2 | Status: SHIPPED | OUTPATIENT
Start: 2019-06-26 | End: 2020-03-16

## 2019-06-27 ENCOUNTER — TELEPHONE (OUTPATIENT)
Dept: SURGERY | Facility: CLINIC | Age: 84
End: 2019-06-27

## 2019-06-27 ENCOUNTER — EPISODE CHANGES (OUTPATIENT)
Dept: CASE MANAGEMENT | Facility: OTHER | Age: 84
End: 2019-06-27

## 2019-06-27 ENCOUNTER — OFFICE VISIT (OUTPATIENT)
Dept: SURGERY | Facility: CLINIC | Age: 84
End: 2019-06-27

## 2019-06-27 DIAGNOSIS — K56.609 SBO (SMALL BOWEL OBSTRUCTION) (HCC): Primary | ICD-10-CM

## 2019-06-27 DIAGNOSIS — T81.49XA SURGICAL SITE INFECTION: ICD-10-CM

## 2019-06-27 PROCEDURE — 87147 CULTURE TYPE IMMUNOLOGIC: CPT | Performed by: SURGERY

## 2019-06-27 PROCEDURE — 87070 CULTURE OTHR SPECIMN AEROBIC: CPT | Performed by: SURGERY

## 2019-06-27 PROCEDURE — 99024 POSTOP FOLLOW-UP VISIT: CPT | Performed by: SURGERY

## 2019-06-27 PROCEDURE — 87205 SMEAR GRAM STAIN: CPT | Performed by: SURGERY

## 2019-06-27 PROCEDURE — 87186 SC STD MICRODIL/AGAR DIL: CPT | Performed by: SURGERY

## 2019-06-27 RX ORDER — AMOXICILLIN AND CLAVULANATE POTASSIUM 875; 125 MG/1; MG/1
1 TABLET, FILM COATED ORAL 2 TIMES DAILY
Qty: 14 TABLET | Refills: 0 | Status: SHIPPED | OUTPATIENT
Start: 2019-06-27 | End: 2019-07-08

## 2019-06-27 NOTE — PROGRESS NOTES
Patient was discharged from the nursing home today.  Overall she is feeling stronger.  Apparently yesterday she began having some drainage from the superior aspect of the wound.    On exam the majority of the wound looks good.  There is some mild erythema and there is a small opening of about 7 or 8 mm.  There is some yellow drainage which does not have a foul odor.  The majority of it appears more serous than anything else.  The fascia appeared to be intact when probed with a Q-tip.  I did take a culture of this.  I then packed the open space with 1/4 inch iodoform gauze.  I will place her on antibiotics as a precaution and I asked her to remove the gauze slowly over the course of the next week.  I will see her back in 1 week.    Silvano Alarcon MD  General and Endoscopic Surgery  [facility]    [address] [place] [address], [address]  [place], [place], [address]  P: [phone]  F: [phone]

## 2019-06-30 LAB
BACTERIA SPEC AEROBE CULT: ABNORMAL
BACTERIA SPEC AEROBE CULT: ABNORMAL
GRAM STN SPEC: ABNORMAL
GRAM STN SPEC: ABNORMAL

## 2019-07-05 ENCOUNTER — PATIENT OUTREACH (OUTPATIENT)
Dept: CASE MANAGEMENT | Facility: OTHER | Age: 84
End: 2019-07-05

## 2019-07-05 ENCOUNTER — TELEPHONE (OUTPATIENT)
Dept: SURGERY | Facility: CLINIC | Age: 84
End: 2019-07-05

## 2019-07-05 ENCOUNTER — EPISODE CHANGES (OUTPATIENT)
Dept: CASE MANAGEMENT | Facility: OTHER | Age: 84
End: 2019-07-05

## 2019-07-05 NOTE — OUTREACH NOTE
Care Coordination Note  Talked with [facility] [phone]. She states patient is receiving  home health services for wound care. She is scheduled for 2 more visit and certification period ends 8/26/19. Will continue to follow.     Cande Villa RN    7/5/2019, 1:38 PM

## 2019-07-05 NOTE — TELEPHONE ENCOUNTER
Agree.  Not sure that any other antibiotics would be advisable.  Since she is now more than a month out from her operation it seems that it may not be related to her surgery at all.  I do not think there is much more that could be determined over the phone, and as such if she has worsening symptoms I think return to the emergency department would probably be most appropriate.

## 2019-07-05 NOTE — OUTREACH NOTE
Care Plan Note    Care Management Plan 7/5/2019   Lifestyle Goals Routine follow-up with doctor(s);Medication management;Other (See Comment)   Lifestyle Goals Incisional healing following SBO   Barriers Disease education;Other (See Comment)   Barriers Family is asssiting with transportation   Self Management Medication Adherence   Annual Wellness Visit:  Patient Refuses at This Time   Care Gaps Addressed (No Data)   Care Gaps Addressed Completed: flu and pneumonia vaccine; mammogram   Specific Disease Process Teaching Hypertension   Does patient have depression diagnosis? No   Advanced Directives: Send Information   Hospitalizations past 12 months 1     The main concerns and/or symptoms the patient would like to address are: Talked with patient.She states to have discharged on 6/27/19 from [facility] for rehab following SBO of 5/30/19. She states to be receiving [facility] health services for wound care. She does BID dressing changes to incision site that is healing without signs and symptoms of infection.She has PCP appointment 7/15/19. Patient lives alone; independent with ADL's; meal preparation; and receiving assistance with transportation from family. Patient ambulates without assistive and uses walker as needed . Patient states to be compliant with medications; medical appointments and BID dressing changes. She reports no difficulty with chest pain; SOB; sleeping and decreased appetite. She wears Life Alert.      Education/instruction provided by Care Coordinator: Reviewed with patient [place] contact information; Advance Directives(send information);  My Chart(active);  gaps in care(addressed); MWV(declines)  and Care Advising program. Patient verbalized understanding. No further questions or concerns voiced at this time.     Follow Up Outreach Due: Follow up as needed.     Cande Villa RN    7/5/2019, 1:18 PM

## 2019-07-05 NOTE — TELEPHONE ENCOUNTER
Pt called to report that she is running a fever of 101.5 s/p exploratory laparotomy with small bowel resection on 5/31. She began having chills earlier today which prompted her to take her temperature. Pt does report that her incision looks okay. The redness has mostly resolved and she only had a small amount of yellowish drainage this morning. Pt reports that she has been cleaning the incision with peroxide BID and that she finished her abx yesterday. Advised that if her fever persists she should proceed to the ER for evaluation. Patient was hesitant to go to the ER so I advised that I'd send you a message to see if you wanted to start her on additional abx or if you would also recommend ER. Patient was also advised that if she does not hear back from me before the end of the day there is a physician on call over the weekend that she can call but again she may be advised to proceed to the ER.

## 2019-07-05 NOTE — TELEPHONE ENCOUNTER
Patient informed. She is still very hesitant to go to the ER and states that she has an appt with Dr. Alarcon on Monday. I reiterated that if her fever does not break and she develops other symptoms she should proceed to the ER over the weekend.

## 2019-07-08 ENCOUNTER — OFFICE VISIT (OUTPATIENT)
Dept: SURGERY | Facility: CLINIC | Age: 84
End: 2019-07-08

## 2019-07-08 DIAGNOSIS — T81.49XA SURGICAL SITE INFECTION: Primary | ICD-10-CM

## 2019-07-08 PROCEDURE — 99024 POSTOP FOLLOW-UP VISIT: CPT | Performed by: SURGERY

## 2019-07-09 NOTE — PROGRESS NOTES
Follow-up bowel resection    Subjective:  Patient is feeling much better.  Strength is improving and appetite is better.  GI function is reasonable.  No further drainage from her wound.    Objective:  Abdomen is soft and benign.  Wound looks quite good and there is no evidence of any active infection at this time.    Assessment and plan:  -Small bowel resection secondary to strangulated internal hernia, seems to be recovering well  -Surgical site infection, this happened quite late but appears to be improved at this time.  No further antibiotics indicated.  -Patient is now at home and is gradually improving  -Follow-up here in 4 weeks    Silvano Alarcon MD  General and Endoscopic Surgery  [facility]    [address], [address]  [place], [place], [address]  P: [phone]  F: [phone]

## 2019-07-15 ENCOUNTER — LAB (OUTPATIENT)
Dept: INTERNAL MEDICINE | Facility: CLINIC | Age: 84
End: 2019-07-15

## 2019-07-15 DIAGNOSIS — K21.00 GASTROESOPHAGEAL REFLUX DISEASE WITH ESOPHAGITIS: ICD-10-CM

## 2019-07-15 DIAGNOSIS — I10 BENIGN ESSENTIAL HYPERTENSION: ICD-10-CM

## 2019-07-15 DIAGNOSIS — R73.01 ELEVATED FASTING GLUCOSE: ICD-10-CM

## 2019-07-15 LAB
ALBUMIN SERPL-MCNC: 4.5 G/DL (ref 3.5–5.2)
ALBUMIN/GLOB SERPL: 2 G/DL
ALP SERPL-CCNC: 82 U/L (ref 39–117)
ALT SERPL-CCNC: 11 U/L (ref 1–33)
AST SERPL-CCNC: 18 U/L (ref 1–32)
BASOPHILS # BLD AUTO: 0.05 10*3/MM3 (ref 0–0.2)
BASOPHILS NFR BLD AUTO: 0.7 % (ref 0–1.5)
BILIRUB SERPL-MCNC: 0.4 MG/DL (ref 0.2–1.2)
BUN SERPL-MCNC: 13 MG/DL (ref 8–23)
BUN/CREAT SERPL: 17.1 (ref 7–25)
CALCIUM SERPL-MCNC: 9.2 MG/DL (ref 8.6–10.5)
CHLORIDE SERPL-SCNC: 100 MMOL/L (ref 98–107)
CHOLEST SERPL-MCNC: 127 MG/DL (ref 0–200)
CHOLEST/HDLC SERPL: 2.49 {RATIO}
CO2 SERPL-SCNC: 29.1 MMOL/L (ref 22–29)
CREAT SERPL-MCNC: 0.76 MG/DL (ref 0.57–1)
EOSINOPHIL # BLD AUTO: 0.2 10*3/MM3 (ref 0–0.4)
EOSINOPHIL NFR BLD AUTO: 2.7 % (ref 0.3–6.2)
ERYTHROCYTE [DISTWIDTH] IN BLOOD BY AUTOMATED COUNT: 13.4 % (ref 12.3–15.4)
GLOBULIN SER CALC-MCNC: 2.3 GM/DL
GLUCOSE SERPL-MCNC: 98 MG/DL (ref 65–99)
HBA1C MFR BLD: 5.3 % (ref 4.8–5.6)
HCT VFR BLD AUTO: 38.4 % (ref 34–46.6)
HDLC SERPL-MCNC: 51 MG/DL (ref 40–60)
HGB BLD-MCNC: 11.2 G/DL (ref 12–15.9)
IMM GRANULOCYTES # BLD AUTO: 0.02 10*3/MM3 (ref 0–0.05)
IMM GRANULOCYTES NFR BLD AUTO: 0.3 % (ref 0–0.5)
LDLC SERPL CALC-MCNC: 54 MG/DL (ref 0–100)
LYMPHOCYTES # BLD AUTO: 2.58 10*3/MM3 (ref 0.7–3.1)
LYMPHOCYTES NFR BLD AUTO: 34.3 % (ref 19.6–45.3)
MCH RBC QN AUTO: 25.9 PG (ref 26.6–33)
MCHC RBC AUTO-ENTMCNC: 29.2 G/DL (ref 31.5–35.7)
MCV RBC AUTO: 88.9 FL (ref 79–97)
MONOCYTES # BLD AUTO: 0.64 10*3/MM3 (ref 0.1–0.9)
MONOCYTES NFR BLD AUTO: 8.5 % (ref 5–12)
NEUTROPHILS # BLD AUTO: 4.03 10*3/MM3 (ref 1.7–7)
NEUTROPHILS NFR BLD AUTO: 53.5 % (ref 42.7–76)
NRBC BLD AUTO-RTO: 0 /100 WBC (ref 0–0.2)
PLATELET # BLD AUTO: 436 10*3/MM3 (ref 140–450)
POTASSIUM SERPL-SCNC: 4.4 MMOL/L (ref 3.5–5.2)
PROT SERPL-MCNC: 6.8 G/DL (ref 6–8.5)
RBC # BLD AUTO: 4.32 10*6/MM3 (ref 3.77–5.28)
SODIUM SERPL-SCNC: 142 MMOL/L (ref 136–145)
TRIGL SERPL-MCNC: 108 MG/DL (ref 0–150)
TSH SERPL DL<=0.005 MIU/L-ACNC: 1.07 MIU/ML (ref 0.27–4.2)
VLDLC SERPL CALC-MCNC: 21.6 MG/DL
WBC # BLD AUTO: 7.52 10*3/MM3 (ref 3.4–10.8)

## 2019-07-18 DIAGNOSIS — F41.1 GENERALIZED ANXIETY DISORDER: ICD-10-CM

## 2019-07-18 RX ORDER — LORAZEPAM 1 MG/1
1 TABLET ORAL DAILY PRN
Qty: 90 TABLET | Refills: 1 | Status: SHIPPED | OUTPATIENT
Start: 2019-07-18 | End: 2019-11-08 | Stop reason: SDUPTHER

## 2019-07-19 ENCOUNTER — PATIENT OUTREACH (OUTPATIENT)
Dept: CASE MANAGEMENT | Facility: OTHER | Age: 84
End: 2019-07-19

## 2019-07-19 NOTE — OUTREACH NOTE
Patient Outreach Note  Talked with patient. Patient reports compliant with medications; medical appointments and recommendations. She has completed home health services;  continues with BID cleaning of abdominal incision which has healed without signs or symptoms of infection. She will see surgeon Dr. Alarcon on 7/22/19 and PCP on 7/29/19. She reports no difficulty with diarrhea; constipation; chest pain; SOB; sleeping and appetite is improving. She is using Boost supplemental drinks. Patient is ambulating without assistive device and driving. Reviewed with patient education regarding hydration; medications; bowel regimen; writing questions for physicians;  24/7 Nurse Line Telephone number; CA contact information; Advance Directives (has Living Will but not filed);  My Chart (active); gaps in care (addressed); MWV(declines) and Care Advising program. Patient verbalized understanding.Patient states to appreciate phone call.  No further questions or concerns voiced at this time.     Cande Villa RN    7/19/2019, 3:17 PM

## 2019-07-22 ENCOUNTER — OFFICE VISIT (OUTPATIENT)
Dept: SURGERY | Facility: CLINIC | Age: 84
End: 2019-07-22

## 2019-07-22 DIAGNOSIS — K56.609 SBO (SMALL BOWEL OBSTRUCTION) (HCC): Primary | ICD-10-CM

## 2019-07-22 PROCEDURE — 99024 POSTOP FOLLOW-UP VISIT: CPT | Performed by: SURGERY

## 2019-07-23 NOTE — PROGRESS NOTES
Postop small bowel resection for strangulated internal hernia    Subjective:  Patient feels much stronger at this time.  She actually describes some occasional constipation and is not having any loose bowels despite the rather substantial amount of small bowel she lost during the operation.  Minimal pain.  Strength is improving.    Objective:  Abdomen is soft benign, incision is well-healed.    Assessment and plan:  -Strangulate internal hernia, now status post small bowel resection and anastomosis  -Patient may gradually increase her activities  -She may follow-up with me as needed    Silvano Alarcon MD  General and Endoscopic Surgery  [facility]    [address], [address]  [place], [place], [address]  P: [phone]  F: [phone]

## 2019-07-29 ENCOUNTER — OFFICE VISIT (OUTPATIENT)
Dept: INTERNAL MEDICINE | Facility: CLINIC | Age: 84
End: 2019-07-29

## 2019-07-29 VITALS
DIASTOLIC BLOOD PRESSURE: 78 MMHG | OXYGEN SATURATION: 95 % | HEART RATE: 41 BPM | HEIGHT: 64 IN | BODY MASS INDEX: 17.75 KG/M2 | RESPIRATION RATE: 16 BRPM | TEMPERATURE: 97.4 F | SYSTOLIC BLOOD PRESSURE: 110 MMHG | WEIGHT: 104 LBS

## 2019-07-29 DIAGNOSIS — G47.00 INSOMNIA, UNSPECIFIED TYPE: ICD-10-CM

## 2019-07-29 DIAGNOSIS — R73.01 ELEVATED FASTING GLUCOSE: ICD-10-CM

## 2019-07-29 DIAGNOSIS — I10 BENIGN ESSENTIAL HYPERTENSION: ICD-10-CM

## 2019-07-29 DIAGNOSIS — K21.00 GASTROESOPHAGEAL REFLUX DISEASE WITH ESOPHAGITIS: ICD-10-CM

## 2019-07-29 DIAGNOSIS — N32.81 OVERACTIVE BLADDER: ICD-10-CM

## 2019-07-29 DIAGNOSIS — K56.609 SBO (SMALL BOWEL OBSTRUCTION) (HCC): ICD-10-CM

## 2019-07-29 DIAGNOSIS — N28.1 RENAL CYST, LEFT: ICD-10-CM

## 2019-07-29 DIAGNOSIS — I10 BENIGN ESSENTIAL HYPERTENSION: Primary | ICD-10-CM

## 2019-07-29 DIAGNOSIS — N81.10 BLADDER PROLAPSE, FEMALE, ACQUIRED: ICD-10-CM

## 2019-07-29 DIAGNOSIS — J30.2 SEASONAL ALLERGIC RHINITIS, UNSPECIFIED TRIGGER: ICD-10-CM

## 2019-07-29 DIAGNOSIS — M85.88 OSTEOPENIA OF SPINE: ICD-10-CM

## 2019-07-29 DIAGNOSIS — K22.719 BARRETT'S ESOPHAGUS WITH DYSPLASIA: ICD-10-CM

## 2019-07-29 PROBLEM — R19.7 DIARRHEA: Status: RESOLVED | Noted: 2017-11-07 | Resolved: 2019-07-29

## 2019-07-29 PROCEDURE — 99214 OFFICE O/P EST MOD 30 MIN: CPT | Performed by: FAMILY MEDICINE

## 2019-07-29 RX ORDER — RANITIDINE 300 MG/1
300 TABLET ORAL NIGHTLY
COMMUNITY
End: 2019-12-02

## 2019-07-29 RX ORDER — AMLODIPINE BESYLATE 5 MG/1
TABLET ORAL
Qty: 90 TABLET | Refills: 3 | Status: SHIPPED | OUTPATIENT
Start: 2019-07-29 | End: 2020-07-13

## 2019-07-29 NOTE — PROGRESS NOTES
Subjective     Brittny Fay is a 84 y.o. female, who presents with a chief complaint of   Chief Complaint   Patient presents with   • Hypertension   • Heartburn     s/p small bowel obstruction   • Anxiety   • Depression     Hypertension   Associated symptoms include anxiety.   Heartburn     1. HTN.  Still tolerating amlodipine.  Denies chest pain, dizziness.  Home blood pressures running around 120s/70s.    2. GERD/Abreu's.  She alternates esomeprazole with ranitidine.  Concerned about long term PPI use.    3. SBO.  She is recovering from surgery 5/28/19 by Dr. Alarcon.    The following portions of the patient's history were reviewed and updated as appropriate: allergies, current medications, past family history, past medical history, past social history, past surgical history and problem list.    Allergies: Amitriptyline; Bupropion; and Pneumococcal vaccines    Review of Systems   Constitutional: Negative.    HENT: Negative.    Eyes: Negative.    Respiratory: Negative.    Cardiovascular: Negative.    Gastrointestinal: Negative.    Endocrine: Negative.    Genitourinary: Negative.    Musculoskeletal: Positive for arthralgias.   Skin: Negative.    Allergic/Immunologic: Positive for environmental allergies.   Neurological: Negative.    Psychiatric/Behavioral: Negative for sleep disturbance.       Objective     Wt Readings from Last 3 Encounters:   07/29/19 47.2 kg (104 lb)   06/04/19 69.3 kg (152 lb 12.5 oz)   05/29/19 53.3 kg (117 lb 9.6 oz)     Temp Readings from Last 3 Encounters:   07/29/19 97.4 °F (36.3 °C) (Oral)   06/09/19 98.3 °F (36.8 °C) (Oral)   05/29/19 98.3 °F (36.8 °C) (Oral)     BP Readings from Last 3 Encounters:   07/29/19 110/78   06/08/19 132/79   05/29/19 134/82     Pulse Readings from Last 3 Encounters:   07/29/19 (!) 41   06/09/19 98   02/25/19 76     Body mass index is 17.85 kg/m².  SpO2 Readings from Last 3 Encounters:   02/12/18 98%   09/25/17 97%   08/09/17 98%       Physical Exam    Constitutional: She is oriented to person, place, and time. She appears well-developed and well-nourished.   HENT:   Head: Normocephalic and atraumatic.   Mouth/Throat: Oropharynx is clear and moist.   Eyes: Conjunctivae and EOM are normal.   Neck: Neck supple. No thyromegaly present.   Cardiovascular: Normal rate, regular rhythm and normal heart sounds.   Pulmonary/Chest: Effort normal and breath sounds normal.   Abdominal: Soft. Bowel sounds are normal. There is no hepatosplenomegaly.   Musculoskeletal: Normal range of motion. She exhibits no edema.   Neurological: She is alert and oriented to person, place, and time.   Skin: Skin is warm and dry. No rash noted.   Psychiatric: She has a normal mood and affect. Her behavior is normal.   Nursing note and vitals reviewed.      Results for orders placed or performed in visit on 07/15/19   Lipid Panel With / Chol / HDL Ratio   Result Value Ref Range    Total Cholesterol 127 0 - 200 mg/dL    Triglycerides 108 0 - 150 mg/dL    HDL Cholesterol 51 40 - 60 mg/dL    VLDL Cholesterol 21.6 mg/dL    LDL Cholesterol  54 0 - 100 mg/dL    Chol/HDL Ratio 2.49    Hemoglobin A1c   Result Value Ref Range    Hemoglobin A1C 5.30 4.80 - 5.60 %   TSH   Result Value Ref Range    TSH 1.070 0.270 - 4.200 mIU/mL   Comprehensive Metabolic Panel   Result Value Ref Range    Glucose 98 65 - 99 mg/dL    BUN 13 8 - 23 mg/dL    Creatinine 0.76 0.57 - 1.00 mg/dL    eGFR Non African Am 73 >60 mL/min/1.73    eGFR African Am 88 >60 mL/min/1.73    BUN/Creatinine Ratio 17.1 7.0 - 25.0    Sodium 142 136 - 145 mmol/L    Potassium 4.4 3.5 - 5.2 mmol/L    Chloride 100 98 - 107 mmol/L    Total CO2 29.1 (H) 22.0 - 29.0 mmol/L    Calcium 9.2 8.6 - 10.5 mg/dL    Total Protein 6.8 6.0 - 8.5 g/dL    Albumin 4.50 3.50 - 5.20 g/dL    Globulin 2.3 gm/dL    A/G Ratio 2.0 g/dL    Total Bilirubin 0.4 0.2 - 1.2 mg/dL    Alkaline Phosphatase 82 39 - 117 U/L    AST (SGOT) 18 1 - 32 U/L    ALT (SGPT) 11 1 - 33 U/L   CBC &  Differential   Result Value Ref Range    WBC 7.52 3.40 - 10.80 10*3/mm3    RBC 4.32 3.77 - 5.28 10*6/mm3    Hemoglobin 11.2 (L) 12.0 - 15.9 g/dL    Hematocrit 38.4 34.0 - 46.6 %    MCV 88.9 79.0 - 97.0 fL    MCH 25.9 (L) 26.6 - 33.0 pg    MCHC 29.2 (L) 31.5 - 35.7 g/dL    RDW 13.4 12.3 - 15.4 %    Platelets 436 140 - 450 10*3/mm3    Neutrophil Rel % 53.5 42.7 - 76.0 %    Lymphocyte Rel % 34.3 19.6 - 45.3 %    Monocyte Rel % 8.5 5.0 - 12.0 %    Eosinophil Rel % 2.7 0.3 - 6.2 %    Basophil Rel % 0.7 0.0 - 1.5 %    Neutrophils Absolute 4.03 1.70 - 7.00 10*3/mm3    Lymphocytes Absolute 2.58 0.70 - 3.10 10*3/mm3    Monocytes Absolute 0.64 0.10 - 0.90 10*3/mm3    Eosinophils Absolute 0.20 0.00 - 0.40 10*3/mm3    Basophils Absolute 0.05 0.00 - 0.20 10*3/mm3    Immature Granulocyte Rel % 0.3 0.0 - 0.5 %    Immature Grans Absolute 0.02 0.00 - 0.05 10*3/mm3    nRBC 0.0 0.0 - 0.2 /100 WBC       Assessment/Plan   Brittny was seen today for heartburn and hypertension.    Diagnoses and all orders for this visit:    Benign essential hypertension  -     Comprehensive Metabolic Panel; Future  -     Lipid Panel With / Chol / HDL Ratio; Future  -     TSH; Future    Seasonal allergic rhinitis, unspecified allergic rhinitis trigger    Gastroesophageal reflux disease without esophagitis  -     CBC & Differential; Future    Overactive bladder    Osteopenia    Insomnia, unspecified type    Elevated fasting glucose  -     Hemoglobin A1c; Future  -     Vitamin B12; Future    Renal cyst, left    SBO    Depression with anxiety    1. HTN.  Controlled with amlodipine 5 mg.  Labs reviewed.    2. TWIN.  Controlled with nasal steroid.    3. GERD/Barretts.  Continue esomeprazole/ranitidine.  She sees Dr. Weber.    4. OAB/bladder prolapse.  Improved after surgical repair.    5. Osteopenia.  Continue calcium and vitamin D, weight bearing exercise.  Last dexa scan 3/2019.    6. Insomnia.  Occasionally takes lorazepam.  Med management agreement and Carlos  UTD.    7. Elevated fasting glucose.  Improved.  A1c 5.3, down from 5.5.  Continue lifestyle measures.    8. Left renal cyst.  Read as simple this last time on u/s.    9. Routine health maint.  Offered annual wellness exam and patient declined.  UTD on vaccines.  Mammogram due; she will do this later when feeling more recovered from her surgery.    10. SBO.  Still recovering.  Followed by Dr. Alarcon.    11. Depression with anxiety.  Controlled with escitalopram.    Outpatient Medications Prior to Visit   Medication Sig Dispense Refill   • amLODIPine (NORVASC) 5 MG tablet TAKE 1 TABLET DAILY 90 tablet 3   • ascorbic acid (VITAMIN C) 100 MG tablet Take 100 mg by mouth Daily.     • calcium carbonate (OS-JOSIANE) 600 MG tablet Take 600 mg by mouth 2 (Two) Times a Day With Meals.     • cholecalciferol (VITAMIN D3) 1000 units tablet Take 1,000 Units by mouth Daily.     • escitalopram (LEXAPRO) 10 MG tablet TAKE 1 TABLET DAILY 90 tablet 2   • esomeprazole (nexIUM) 40 MG capsule Take 1 capsule by mouth Daily. 90 capsule 3   • fluticasone (FLONASE) 50 MCG/ACT nasal spray 2 sprays into each nostril Daily. 1 bottle 2   • LORazepam (ATIVAN) 1 MG tablet Take 1 tablet by mouth Daily As Needed for Anxiety. 90 tablet 1   • magnesium chloride ER 64 MG DR tablet Take 64 mg by mouth Daily.     • Multiple Vitamins-Minerals (WOMENS MULTI VITAMIN & MINERAL) tablet Take 1 tablet by mouth Daily.     • oxyCODONE-acetaminophen (PERCOCET) 5-325 MG per tablet Take 1 tablet by mouth Every 4 (Four) Hours As Needed for Moderate Pain  for up to 12 doses. 12 tablet 0   • POTASSIUM PO Take 2 tablets by mouth Daily.     • Probiotic Product (PROBIOTIC DAILY) capsule Take 1 capsule by mouth Daily.     • raNITIdine (ZANTAC) 300 MG tablet Take 300 mg by mouth Every Night.     • vitamin B-12 (CYANOCOBALAMIN) 500 MCG tablet Take 500 mcg by mouth Daily.     • Wheat Dextrin (BENEFIBER PO) Take 1 tablet by mouth Daily.       No facility-administered medications  prior to visit.      No orders of the defined types were placed in this encounter.    [unfilled]  There are no discontinued medications.    Return in about 6 months (around 1/29/2020).

## 2019-08-09 ENCOUNTER — EPISODE CHANGES (OUTPATIENT)
Dept: CASE MANAGEMENT | Facility: OTHER | Age: 84
End: 2019-08-09

## 2019-08-09 ENCOUNTER — PATIENT OUTREACH (OUTPATIENT)
Dept: CASE MANAGEMENT | Facility: OTHER | Age: 84
End: 2019-08-09

## 2019-08-09 NOTE — OUTREACH NOTE
Care Coordination Note  Talked with [facility] [phone]. She states patient completed home health services 7/16/19. CA will continue to follow.     Cande Villa RN    8/9/2019, 4:24 PM

## 2019-08-12 ENCOUNTER — OFFICE VISIT (OUTPATIENT)
Dept: SURGERY | Facility: CLINIC | Age: 84
End: 2019-08-12

## 2019-08-12 VITALS — BODY MASS INDEX: 18.78 KG/M2 | OXYGEN SATURATION: 98 % | WEIGHT: 110 LBS | HEART RATE: 73 BPM | HEIGHT: 64 IN

## 2019-08-12 DIAGNOSIS — K56.609 SBO (SMALL BOWEL OBSTRUCTION) (HCC): Primary | ICD-10-CM

## 2019-08-12 PROCEDURE — 99024 POSTOP FOLLOW-UP VISIT: CPT | Performed by: SURGERY

## 2019-08-13 ENCOUNTER — HOSPITAL ENCOUNTER (OUTPATIENT)
Dept: CT IMAGING | Facility: HOSPITAL | Age: 84
Discharge: HOME | End: 2019-08-13
Admitting: SURGERY

## 2019-08-13 DIAGNOSIS — K56.609 SBO (SMALL BOWEL OBSTRUCTION) (HCC): ICD-10-CM

## 2019-08-13 PROCEDURE — 74177 CT ABD & PELVIS W/CONTRAST: CPT

## 2019-08-13 PROCEDURE — 0 DIATRIZOATE MEGLUMINE & SODIUM PER 1 ML: Performed by: SURGERY

## 2019-08-13 PROCEDURE — 25010000002 IOPAMIDOL 61 % SOLUTION: Performed by: SURGERY

## 2019-08-13 PROCEDURE — 82565 ASSAY OF CREATININE: CPT

## 2019-08-13 RX ADMIN — DIATRIZOATE MEGLUMINE AND DIATRIZOATE SODIUM 30 ML: 660; 100 LIQUID ORAL; RECTAL at 08:00

## 2019-08-13 RX ADMIN — IOPAMIDOL 85 ML: 612 INJECTION, SOLUTION INTRAVENOUS at 09:00

## 2019-08-13 NOTE — PROGRESS NOTES
Chief complaint: Abdominal pain    This nice lady is about 10 weeks out from laparotomy for a strangulated internal hernia resulting in significant small bowel resection.  When I last saw her she was doing quite well.  She says that over about the last week she developed a new pain just to the left of her umbilicus.  It is made worse with walking or bending over.  It is not associated with any nausea or vomiting.    Objective:  General: Awake alert and oriented, no distress  Abdomen is soft, incision appears well-healed.  I am not able to appreciate any hernia, but there is some tenderness near the umbilicus.    Assessment and plan:  -Strangulate it internal hernia causing small bowel obstruction, now 10 weeks out from resection and clinically had been doing well but now with new periumbilical pain.  Patient feels that there is a mass there but I am really not able to appreciate one.  I am going to order a new CT of her abdomen pelvis to evaluate this.  It may be a simple abdominal wall strain.  We will contact the patient with the results once these are obtained.    Silvano Alarcon MD  General and Endoscopic Surgery  [facility]    [address], [address]  [place], [place], [address]  P: [phone]  F: [phone]

## 2019-08-14 LAB — CREAT BLDA-MCNC: 0.6 MG/DL (ref 0.6–1.3)

## 2019-08-15 ENCOUNTER — TELEPHONE (OUTPATIENT)
Dept: SURGERY | Facility: CLINIC | Age: 84
End: 2019-08-15

## 2019-08-15 ENCOUNTER — DOCUMENTATION (OUTPATIENT)
Dept: SURGERY | Facility: CLINIC | Age: 84
End: 2019-08-15

## 2019-08-15 NOTE — PROGRESS NOTES
Called and spoke with patient regarding CT results.  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  She said that she is feeling slightly better.  I recommended use of ibuprofen or Tylenol.  No surgical intervention is anticipated.    Silvano Alarcon MD  General and Endoscopic Surgery  [facility]    [address], [address]  [place], [place], [address]  P: [phone]  F: [phone]

## 2019-08-19 NOTE — TELEPHONE ENCOUNTER
Spoke with Ms Fay and informed of Dr Alarcon response to her questions.  She acknowledged understanding

## 2019-08-19 NOTE — TELEPHONE ENCOUNTER
Ms Fay called back with additional questions regarding her CT scan.  You had told her some of her omentum has - she asked   1. Why would the omentum die  2.  What happens to the omentum when this happens- does the body absorb it.  3.  Will the pain continue.  It has improved some but she still has pain

## 2019-08-19 NOTE — TELEPHONE ENCOUNTER
1)  I don't know why her omentum .  2)  it will dry up and essentially fade away  3)  Pain should gradually improve, I am not sure how long it will be

## 2019-08-27 ENCOUNTER — TELEPHONE (OUTPATIENT)
Dept: SURGERY | Facility: CLINIC | Age: 84
End: 2019-08-27

## 2019-08-27 NOTE — TELEPHONE ENCOUNTER
Calls stating she is having a clear greasy fluid coming from her incision at the dark area in picture.   No redness, No temp

## 2019-09-16 ENCOUNTER — TRANSCRIBE ORDERS (OUTPATIENT)
Dept: ADMINISTRATIVE | Facility: HOSPITAL | Age: 84
End: 2019-09-16

## 2019-09-16 DIAGNOSIS — Z12.39 BREAST CANCER SCREENING: Primary | ICD-10-CM

## 2019-10-04 ENCOUNTER — HOSPITAL ENCOUNTER (OUTPATIENT)
Dept: MAMMOGRAPHY | Facility: HOSPITAL | Age: 84
Discharge: HOME | End: 2019-10-04
Admitting: FAMILY MEDICINE

## 2019-10-04 DIAGNOSIS — Z12.39 BREAST CANCER SCREENING: ICD-10-CM

## 2019-10-04 PROCEDURE — 77067 SCR MAMMO BI INCL CAD: CPT

## 2019-10-04 PROCEDURE — 77063 BREAST TOMOSYNTHESIS BI: CPT

## 2019-11-08 DIAGNOSIS — F41.1 GENERALIZED ANXIETY DISORDER: ICD-10-CM

## 2019-11-11 RX ORDER — LORAZEPAM 1 MG/1
1 TABLET ORAL DAILY PRN
Qty: 90 TABLET | Refills: 1 | Status: SHIPPED | OUTPATIENT
Start: 2019-11-11 | End: 2019-11-12 | Stop reason: SDUPTHER

## 2019-11-12 DIAGNOSIS — F41.1 GENERALIZED ANXIETY DISORDER: ICD-10-CM

## 2019-11-13 RX ORDER — LORAZEPAM 1 MG/1
1 TABLET ORAL DAILY PRN
Qty: 90 TABLET | Refills: 1 | Status: SHIPPED | OUTPATIENT
Start: 2019-11-13 | End: 2020-02-18 | Stop reason: SDUPTHER

## 2019-11-20 ENCOUNTER — LAB (OUTPATIENT)
Dept: INTERNAL MEDICINE | Facility: CLINIC | Age: 84
End: 2019-11-20

## 2019-11-20 DIAGNOSIS — R73.01 ELEVATED FASTING GLUCOSE: ICD-10-CM

## 2019-11-20 DIAGNOSIS — I10 BENIGN ESSENTIAL HYPERTENSION: ICD-10-CM

## 2019-11-20 DIAGNOSIS — K21.00 GASTROESOPHAGEAL REFLUX DISEASE WITH ESOPHAGITIS: ICD-10-CM

## 2019-11-20 LAB
ALBUMIN SERPL-MCNC: 4.4 G/DL (ref 3.5–5.2)
ALBUMIN/GLOB SERPL: 1.8 G/DL
ALP SERPL-CCNC: 63 U/L (ref 39–117)
ALT SERPL-CCNC: 7 U/L (ref 1–33)
AST SERPL-CCNC: 15 U/L (ref 1–32)
BASOPHILS # BLD AUTO: 0.04 10*3/MM3 (ref 0–0.2)
BASOPHILS NFR BLD AUTO: 0.7 % (ref 0–1.5)
BILIRUB SERPL-MCNC: 0.5 MG/DL (ref 0.2–1.2)
BUN SERPL-MCNC: 17 MG/DL (ref 8–23)
BUN/CREAT SERPL: 23 (ref 7–25)
CALCIUM SERPL-MCNC: 9.2 MG/DL (ref 8.6–10.5)
CHLORIDE SERPL-SCNC: 103 MMOL/L (ref 98–107)
CHOLEST SERPL-MCNC: 145 MG/DL (ref 0–200)
CHOLEST/HDLC SERPL: 2.27 {RATIO}
CO2 SERPL-SCNC: 31.4 MMOL/L (ref 22–29)
CREAT SERPL-MCNC: 0.74 MG/DL (ref 0.57–1)
EOSINOPHIL # BLD AUTO: 0.2 10*3/MM3 (ref 0–0.4)
EOSINOPHIL NFR BLD AUTO: 3.3 % (ref 0.3–6.2)
ERYTHROCYTE [DISTWIDTH] IN BLOOD BY AUTOMATED COUNT: 15.6 % (ref 12.3–15.4)
GLOBULIN SER CALC-MCNC: 2.4 GM/DL
GLUCOSE SERPL-MCNC: 92 MG/DL (ref 65–99)
HBA1C MFR BLD: 5.7 % (ref 4.8–5.6)
HCT VFR BLD AUTO: 40.5 % (ref 34–46.6)
HDLC SERPL-MCNC: 64 MG/DL (ref 40–60)
HGB BLD-MCNC: 12.3 G/DL (ref 12–15.9)
IMM GRANULOCYTES # BLD AUTO: 0.01 10*3/MM3 (ref 0–0.05)
IMM GRANULOCYTES NFR BLD AUTO: 0.2 % (ref 0–0.5)
LDLC SERPL CALC-MCNC: 68 MG/DL (ref 0–100)
LYMPHOCYTES # BLD AUTO: 2.2 10*3/MM3 (ref 0.7–3.1)
LYMPHOCYTES NFR BLD AUTO: 36 % (ref 19.6–45.3)
MCH RBC QN AUTO: 24.6 PG (ref 26.6–33)
MCHC RBC AUTO-ENTMCNC: 30.4 G/DL (ref 31.5–35.7)
MCV RBC AUTO: 80.8 FL (ref 79–97)
MONOCYTES # BLD AUTO: 0.55 10*3/MM3 (ref 0.1–0.9)
MONOCYTES NFR BLD AUTO: 9 % (ref 5–12)
NEUTROPHILS # BLD AUTO: 3.11 10*3/MM3 (ref 1.7–7)
NEUTROPHILS NFR BLD AUTO: 50.8 % (ref 42.7–76)
NRBC BLD AUTO-RTO: 0 /100 WBC (ref 0–0.2)
PLATELET # BLD AUTO: 249 10*3/MM3 (ref 140–450)
POTASSIUM SERPL-SCNC: 4.3 MMOL/L (ref 3.5–5.2)
PROT SERPL-MCNC: 6.8 G/DL (ref 6–8.5)
RBC # BLD AUTO: 5.01 10*6/MM3 (ref 3.77–5.28)
SODIUM SERPL-SCNC: 143 MMOL/L (ref 136–145)
TRIGL SERPL-MCNC: 66 MG/DL (ref 0–150)
TSH SERPL DL<=0.005 MIU/L-ACNC: 1.25 UIU/ML (ref 0.27–4.2)
VLDLC SERPL CALC-MCNC: 13.2 MG/DL
WBC # BLD AUTO: 6.11 10*3/MM3 (ref 3.4–10.8)

## 2019-12-02 ENCOUNTER — OFFICE VISIT (OUTPATIENT)
Dept: INTERNAL MEDICINE | Facility: CLINIC | Age: 84
End: 2019-12-02

## 2019-12-02 VITALS
TEMPERATURE: 98 F | SYSTOLIC BLOOD PRESSURE: 110 MMHG | RESPIRATION RATE: 16 BRPM | BODY MASS INDEX: 18.95 KG/M2 | HEART RATE: 73 BPM | OXYGEN SATURATION: 96 % | HEIGHT: 64 IN | WEIGHT: 111 LBS | DIASTOLIC BLOOD PRESSURE: 72 MMHG

## 2019-12-02 DIAGNOSIS — R73.01 ELEVATED FASTING GLUCOSE: ICD-10-CM

## 2019-12-02 DIAGNOSIS — N28.1 RENAL CYST, LEFT: ICD-10-CM

## 2019-12-02 DIAGNOSIS — G47.00 INSOMNIA, UNSPECIFIED TYPE: ICD-10-CM

## 2019-12-02 DIAGNOSIS — K21.00 GASTROESOPHAGEAL REFLUX DISEASE WITH ESOPHAGITIS: ICD-10-CM

## 2019-12-02 DIAGNOSIS — Z00.00 ROUTINE HEALTH MAINTENANCE: ICD-10-CM

## 2019-12-02 DIAGNOSIS — J30.2 SEASONAL ALLERGIC RHINITIS, UNSPECIFIED TRIGGER: ICD-10-CM

## 2019-12-02 DIAGNOSIS — M85.88 OSTEOPENIA OF SPINE: ICD-10-CM

## 2019-12-02 DIAGNOSIS — I10 BENIGN ESSENTIAL HYPERTENSION: Primary | ICD-10-CM

## 2019-12-02 DIAGNOSIS — R30.0 DYSURIA: ICD-10-CM

## 2019-12-02 DIAGNOSIS — K22.719 BARRETT'S ESOPHAGUS WITH DYSPLASIA: ICD-10-CM

## 2019-12-02 DIAGNOSIS — R82.998 LEUKOCYTES IN URINE: ICD-10-CM

## 2019-12-02 LAB
BILIRUB BLD-MCNC: NEGATIVE MG/DL
CLARITY, POC: ABNORMAL
COLOR UR: YELLOW
GLUCOSE UR STRIP-MCNC: NEGATIVE MG/DL
KETONES UR QL: NEGATIVE
LEUKOCYTE EST, POC: ABNORMAL
NITRITE UR-MCNC: NEGATIVE MG/ML
PH UR: 6 [PH] (ref 5–8)
PROT UR STRIP-MCNC: NEGATIVE MG/DL
RBC # UR STRIP: NEGATIVE /UL
SP GR UR: 1.01 (ref 1–1.03)
UROBILINOGEN UR QL: NORMAL

## 2019-12-02 PROCEDURE — 99214 OFFICE O/P EST MOD 30 MIN: CPT | Performed by: FAMILY MEDICINE

## 2019-12-02 PROCEDURE — 81003 URINALYSIS AUTO W/O SCOPE: CPT | Performed by: FAMILY MEDICINE

## 2019-12-02 RX ORDER — SULFAMETHOXAZOLE AND TRIMETHOPRIM 800; 160 MG/1; MG/1
1 TABLET ORAL 2 TIMES DAILY
Qty: 6 TABLET | Refills: 0 | Status: SHIPPED | OUTPATIENT
Start: 2019-12-02 | End: 2019-12-05

## 2019-12-02 NOTE — PROGRESS NOTES
Subjective     Brittny Fay is a 84 y.o. female, who presents with a chief complaint of   Chief Complaint   Patient presents with   • Hypertension   • Hyperglycemia   • Heartburn   • Depression   • Anxiety     Hypertension   Associated symptoms include anxiety.   Heartburn     Anxiety       Her past medical history is significant for depression.   Depression   Hyperglycemia   Associated symptoms include arthralgias.     1. HTN.  Still tolerating amlodipine.  Denies chest pain, dizziness.  Home blood pressures at goal.    2. GERD/Abreu's.  She alternates esomeprazole with ranitidine.  Concerned about long term PPI use.    3. Depression.  Pt reports she is well-controlled; feeling well.    The following portions of the patient's history were reviewed and updated as appropriate: allergies, current medications, past family history, past medical history, past social history, past surgical history and problem list.    Allergies: Amitriptyline; Bupropion; and Pneumococcal vaccines    Review of Systems   Constitutional: Negative.    HENT: Negative.    Eyes: Negative.    Respiratory: Negative.    Cardiovascular: Negative.    Gastrointestinal: Negative.    Endocrine: Negative.    Genitourinary: Negative.    Musculoskeletal: Positive for arthralgias.   Skin: Negative.    Allergic/Immunologic: Positive for environmental allergies.   Neurological: Negative.    Psychiatric/Behavioral: Negative for sleep disturbance.       Objective     Wt Readings from Last 3 Encounters:   12/02/19 50.3 kg (111 lb)   08/12/19 49.9 kg (110 lb)   07/29/19 47.2 kg (104 lb)     Temp Readings from Last 3 Encounters:   12/02/19 98 °F (36.7 °C) (Oral)   07/29/19 97.4 °F (36.3 °C) (Oral)   06/09/19 98.3 °F (36.8 °C) (Oral)     BP Readings from Last 3 Encounters:   12/02/19 110/72   07/29/19 110/78   06/08/19 132/79     Pulse Readings from Last 3 Encounters:   12/02/19 73   08/12/19 73   07/29/19 (!) 41     Body mass index is 19.05 kg/m².  SpO2  Readings from Last 3 Encounters:   02/12/18 98%   09/25/17 97%   08/09/17 98%       Physical Exam   Constitutional: She is oriented to person, place, and time. She appears well-developed and well-nourished.   HENT:   Head: Normocephalic and atraumatic.   Mouth/Throat: Oropharynx is clear and moist.   Eyes: Conjunctivae and EOM are normal.   Neck: Neck supple. No thyromegaly present.   Cardiovascular: Normal rate, regular rhythm and normal heart sounds.   Pulmonary/Chest: Effort normal and breath sounds normal.   Abdominal: Soft. Bowel sounds are normal. There is no hepatosplenomegaly.   Musculoskeletal: Normal range of motion. She exhibits no edema.   Neurological: She is alert and oriented to person, place, and time.   Skin: Skin is warm and dry. No rash noted.   Psychiatric: She has a normal mood and affect. Her behavior is normal.   Nursing note and vitals reviewed.      Results for orders placed or performed in visit on 11/20/19   Lipid Panel With / Chol / HDL Ratio   Result Value Ref Range    Total Cholesterol 145 0 - 200 mg/dL    Triglycerides 66 0 - 150 mg/dL    HDL Cholesterol 64 (H) 40 - 60 mg/dL    VLDL Cholesterol 13.2 mg/dL    LDL Cholesterol  68 0 - 100 mg/dL    Chol/HDL Ratio 2.27    TSH   Result Value Ref Range    TSH 1.250 0.270 - 4.200 uIU/mL   Hemoglobin A1c   Result Value Ref Range    Hemoglobin A1C 5.70 (H) 4.80 - 5.60 %   Comprehensive Metabolic Panel   Result Value Ref Range    Glucose 92 65 - 99 mg/dL    BUN 17 8 - 23 mg/dL    Creatinine 0.74 0.57 - 1.00 mg/dL    eGFR Non African Am 75 >60 mL/min/1.73    eGFR African Am 91 >60 mL/min/1.73    BUN/Creatinine Ratio 23.0 7.0 - 25.0    Sodium 143 136 - 145 mmol/L    Potassium 4.3 3.5 - 5.2 mmol/L    Chloride 103 98 - 107 mmol/L    Total CO2 31.4 (H) 22.0 - 29.0 mmol/L    Calcium 9.2 8.6 - 10.5 mg/dL    Total Protein 6.8 6.0 - 8.5 g/dL    Albumin 4.40 3.50 - 5.20 g/dL    Globulin 2.4 gm/dL    A/G Ratio 1.8 g/dL    Total Bilirubin 0.5 0.2 - 1.2 mg/dL     Alkaline Phosphatase 63 39 - 117 U/L    AST (SGOT) 15 1 - 32 U/L    ALT (SGPT) 7 1 - 33 U/L   CBC & Differential   Result Value Ref Range    WBC 6.11 3.40 - 10.80 10*3/mm3    RBC 5.01 3.77 - 5.28 10*6/mm3    Hemoglobin 12.3 12.0 - 15.9 g/dL    Hematocrit 40.5 34.0 - 46.6 %    MCV 80.8 79.0 - 97.0 fL    MCH 24.6 (L) 26.6 - 33.0 pg    MCHC 30.4 (L) 31.5 - 35.7 g/dL    RDW 15.6 (H) 12.3 - 15.4 %    Platelets 249 140 - 450 10*3/mm3    Neutrophil Rel % 50.8 42.7 - 76.0 %    Lymphocyte Rel % 36.0 19.6 - 45.3 %    Monocyte Rel % 9.0 5.0 - 12.0 %    Eosinophil Rel % 3.3 0.3 - 6.2 %    Basophil Rel % 0.7 0.0 - 1.5 %    Neutrophils Absolute 3.11 1.70 - 7.00 10*3/mm3    Lymphocytes Absolute 2.20 0.70 - 3.10 10*3/mm3    Monocytes Absolute 0.55 0.10 - 0.90 10*3/mm3    Eosinophils Absolute 0.20 0.00 - 0.40 10*3/mm3    Basophils Absolute 0.04 0.00 - 0.20 10*3/mm3    Immature Granulocyte Rel % 0.2 0.0 - 0.5 %    Immature Grans Absolute 0.01 0.00 - 0.05 10*3/mm3    nRBC 0.0 0.0 - 0.2 /100 WBC       Assessment/Plan   Brittny was seen today for heartburn and hypertension.    Diagnoses and all orders for this visit:    Benign essential hypertension  -     Comprehensive Metabolic Panel; Future  -     Lipid Panel With / Chol / HDL Ratio; Future  -     TSH; Future    Seasonal allergic rhinitis, unspecified allergic rhinitis trigger    Gastroesophageal reflux disease without esophagitis  -     CBC & Differential; Future    Overactive bladder    Osteopenia    Insomnia, unspecified type    Elevated fasting glucose  -     Hemoglobin A1c; Future  -     Vitamin B12; Future    Renal cyst, left    SBO    Depression with anxiety    1. HTN.  Controlled with amlodipine 5 mg.  Labs reviewed.    2. TWIN.  Controlled with nasal steroid.    3. GERD/Barretts.  Continue esomeprazole/ranitidine.  She sees Dr. Young.    4. OAB/bladder prolapse.  Improved after surgical repair.    5. Osteopenia.  Continue calcium and vitamin D, weight bearing exercise.  Last  dexa scan 3/2019.    6. Insomnia.  Occasionally takes lorazepam.  Med management agreement and Carlos UTD.    7. Elevated fasting glucose.  Improved.  A1c 5.7, up from 5.3.  Continue lifestyle measures.    8. Left renal cyst.  Read as simple this last time on u/s.    9. Routine health maint.  Offered annual wellness exam and patient declined.  UTD on vaccines.  Mammogram UTD.    10. SBO.  Recovering well.  Followed by Dr. Alarcon.    11. Depression with anxiety.  Controlled with escitalopram.    Outpatient Medications Prior to Visit   Medication Sig Dispense Refill   • amLODIPine (NORVASC) 5 MG tablet TAKE 1 TABLET DAILY 90 tablet 3   • ascorbic acid (VITAMIN C) 100 MG tablet Take 100 mg by mouth Daily.     • calcium carbonate (OS-JOSIANE) 600 MG tablet Take 600 mg by mouth 2 (Two) Times a Day With Meals.     • cholecalciferol (VITAMIN D3) 1000 units tablet Take 1,000 Units by mouth Daily.     • escitalopram (LEXAPRO) 10 MG tablet TAKE 1 TABLET DAILY 90 tablet 2   • esomeprazole (nexIUM) 40 MG capsule Take 1 capsule by mouth Daily. 90 capsule 3   • fluticasone (FLONASE) 50 MCG/ACT nasal spray 2 sprays into each nostril Daily. 1 bottle 2   • LORazepam (ATIVAN) 1 MG tablet Take 1 tablet by mouth Daily As Needed for Anxiety. 90 tablet 1   • magnesium chloride ER 64 MG DR tablet Take 64 mg by mouth Daily.     • Multiple Vitamins-Minerals (WOMENS MULTI VITAMIN & MINERAL) tablet Take 1 tablet by mouth Daily.     • POTASSIUM PO Take 2 tablets by mouth Daily.     • Probiotic Product (PROBIOTIC DAILY) capsule Take 1 capsule by mouth Daily.     • vitamin B-12 (CYANOCOBALAMIN) 500 MCG tablet Take 500 mcg by mouth Daily.     • Wheat Dextrin (BENEFIBER PO) Take 1 tablet by mouth Daily.     • oxyCODONE-acetaminophen (PERCOCET) 5-325 MG per tablet Take 1 tablet by mouth Every 4 (Four) Hours As Needed for Moderate Pain  for up to 12 doses. 12 tablet 0   • raNITIdine (ZANTAC) 300 MG tablet Take 300 mg by mouth Every Night.       No  facility-administered medications prior to visit.      No orders of the defined types were placed in this encounter.    [unfilled]  Medications Discontinued During This Encounter   Medication Reason   • raNITIdine (ZANTAC) 300 MG tablet    • oxyCODONE-acetaminophen (PERCOCET) 5-325 MG per tablet *Therapy completed       Return in about 6 months (around 6/2/2020).

## 2019-12-04 LAB
BACTERIA UR CULT: NORMAL
BACTERIA UR CULT: NORMAL

## 2019-12-23 ENCOUNTER — EPISODE CHANGES (OUTPATIENT)
Dept: CASE MANAGEMENT | Facility: OTHER | Age: 84
End: 2019-12-23

## 2019-12-23 ENCOUNTER — TELEPHONE (OUTPATIENT)
Dept: INTERNAL MEDICINE | Facility: CLINIC | Age: 84
End: 2019-12-23

## 2019-12-23 RX ORDER — RANITIDINE 300 MG/1
TABLET ORAL
Qty: 90 TABLET | Refills: 3 | OUTPATIENT
Start: 2019-12-23 | End: 2020-08-12

## 2019-12-23 NOTE — TELEPHONE ENCOUNTER
Patient requesting call back as she has questions about urinary culture results from earlier this month.

## 2019-12-23 NOTE — TELEPHONE ENCOUNTER
Advised that the culture did not reveal any urinary bacteria.  Patient states she is asymptomatic.

## 2020-02-18 DIAGNOSIS — F41.1 GENERALIZED ANXIETY DISORDER: ICD-10-CM

## 2020-02-18 RX ORDER — LORAZEPAM 1 MG/1
1 TABLET ORAL DAILY PRN
Qty: 90 TABLET | Refills: 1 | Status: SHIPPED | OUTPATIENT
Start: 2020-02-18 | End: 2020-02-19 | Stop reason: SDUPTHER

## 2020-02-19 DIAGNOSIS — F41.1 GENERALIZED ANXIETY DISORDER: ICD-10-CM

## 2020-02-19 NOTE — TELEPHONE ENCOUNTER
Pt called and LVM on RX line. Pt states that Lorazepam was sent to [facility] yesterday instead of [facility]. Pt cancelled RX, ernie confirms this. Resend to [facility]. Ernie UTD, LOV 12/19

## 2020-02-20 RX ORDER — LORAZEPAM 1 MG/1
1 TABLET ORAL DAILY PRN
Qty: 90 TABLET | Refills: 0 | Status: SHIPPED | OUTPATIENT
Start: 2020-02-20 | End: 2020-05-18 | Stop reason: SDUPTHER

## 2020-03-15 DIAGNOSIS — F41.8 DEPRESSION WITH ANXIETY: ICD-10-CM

## 2020-03-16 RX ORDER — ESCITALOPRAM OXALATE 10 MG/1
TABLET ORAL
Qty: 90 TABLET | Refills: 2 | OUTPATIENT
Start: 2020-03-16 | End: 2020-07-24

## 2020-05-13 RX ORDER — ESOMEPRAZOLE MAGNESIUM 40 MG/1
CAPSULE, DELAYED RELEASE ORAL
Qty: 90 CAPSULE | Refills: 3 | OUTPATIENT
Start: 2020-05-13

## 2020-05-15 RX ORDER — ESOMEPRAZOLE MAGNESIUM 40 MG/1
40 CAPSULE, DELAYED RELEASE ORAL DAILY
Qty: 90 CAPSULE | Refills: 0 | Status: SHIPPED | OUTPATIENT
Start: 2020-05-15 | End: 2020-06-03 | Stop reason: SDUPTHER

## 2020-05-15 NOTE — TELEPHONE ENCOUNTER
Kisha Hanson, SYDNI  [facility] Clinical 2 Pool   Caller: Unspecified (2 days ago,  7:09 AM)             Okay to refill for 3 months but needs a yearly f/u in sometime in August or Sept for more refills.

## 2020-05-18 DIAGNOSIS — F41.1 GENERALIZED ANXIETY DISORDER: ICD-10-CM

## 2020-05-18 RX ORDER — LORAZEPAM 1 MG/1
1 TABLET ORAL DAILY PRN
Qty: 90 TABLET | Refills: 0 | Status: SHIPPED | OUTPATIENT
Start: 2020-05-18 | End: 2020-09-08 | Stop reason: SDUPTHER

## 2020-05-18 NOTE — TELEPHONE ENCOUNTER
PATIENT CALLING IN TO REQUEST A REFILL OF LORazepam (ATIVAN) 1 MG tablet    VERIFIED [facility] - [place], [place] - [address] AT [address] [facility] - [phone]  - [phone]     PATIENT CALLBACK NUMBER [phone]

## 2020-06-03 RX ORDER — ESOMEPRAZOLE MAGNESIUM 40 MG/1
40 CAPSULE, DELAYED RELEASE ORAL DAILY
Qty: 90 CAPSULE | Refills: 0 | Status: SHIPPED | OUTPATIENT
Start: 2020-06-03 | End: 2020-08-13 | Stop reason: SDUPTHER

## 2020-07-12 DIAGNOSIS — I10 BENIGN ESSENTIAL HYPERTENSION: ICD-10-CM

## 2020-07-13 RX ORDER — AMLODIPINE BESYLATE 5 MG/1
TABLET ORAL
Qty: 90 TABLET | Refills: 3 | Status: SHIPPED | OUTPATIENT
Start: 2020-07-13 | End: 2021-06-08 | Stop reason: SDUPTHER

## 2020-08-11 RX ORDER — ESOMEPRAZOLE MAGNESIUM 40 MG/1
CAPSULE, DELAYED RELEASE ORAL
Qty: 90 CAPSULE | Refills: 0 | OUTPATIENT
Start: 2020-08-11

## 2020-08-13 RX ORDER — ESOMEPRAZOLE MAGNESIUM 40 MG/1
40 CAPSULE, DELAYED RELEASE ORAL DAILY
Qty: 90 CAPSULE | Refills: 0 | Status: SHIPPED | OUTPATIENT
Start: 2020-08-13 | End: 2020-08-26 | Stop reason: SDUPTHER

## 2020-08-13 NOTE — TELEPHONE ENCOUNTER
ED daily refill patient I saw for potential halo.  Okay to refill meds and make follow-up appointment with me.  CRYSTAL.     **It is noted that pt has f/u appt with ANG Hanson on 8/26 - message to Dr Young.  Niurka Allred, PARADISE.  Oanh completed for nexium 40 mg cap po daily, #90, R0.

## 2020-08-26 ENCOUNTER — OFFICE VISIT (OUTPATIENT)
Dept: GASTROENTEROLOGY | Facility: CLINIC | Age: 85
End: 2020-08-26

## 2020-08-26 VITALS — TEMPERATURE: 98.2 F | HEIGHT: 64 IN | WEIGHT: 119.4 LBS | BODY MASS INDEX: 20.38 KG/M2

## 2020-08-26 DIAGNOSIS — Z87.19 HISTORY OF SMALL BOWEL OBSTRUCTION: ICD-10-CM

## 2020-08-26 DIAGNOSIS — K58.0 IRRITABLE BOWEL SYNDROME WITH DIARRHEA: ICD-10-CM

## 2020-08-26 DIAGNOSIS — K21.9 GASTROESOPHAGEAL REFLUX DISEASE, ESOPHAGITIS PRESENCE NOT SPECIFIED: Primary | ICD-10-CM

## 2020-08-26 PROCEDURE — 99213 OFFICE O/P EST LOW 20 MIN: CPT | Performed by: NURSE PRACTITIONER

## 2020-08-26 RX ORDER — ESOMEPRAZOLE MAGNESIUM 40 MG/1
40 CAPSULE, DELAYED RELEASE ORAL DAILY
Qty: 90 CAPSULE | Refills: 3 | Status: SHIPPED | OUTPATIENT
Start: 2020-08-26 | End: 2021-10-25

## 2020-08-26 NOTE — PROGRESS NOTES
Chief Complaint   Patient presents with   • Heartburn     HPI    Brittny Fay is a  85 y.o. female here for a follow up visit for heartburn.  This patient follows with Dr. Young, new to me. Patient has a history of diverticulitis, irritable bowel syndrome, and hypertension, and Abreu's esophagus, and SBO 2019 requiring laparotomy.    On visit today she doing quite well.  She needs a refill of Nexium.  She reports adequate control GERD symptoms.  No nausea, vomiting, or dysphagia.    Bowels are moving 3-4 times a day formed.  Rare diarrhea with certain dietary triggers.  No rectal bleeding, rectal pain, or abdominal pain.    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   • Bladder prolapse, female, acquired    • Chest pain    • Chronic interstitial cystitis    • Colon polyps 09/23/2015   • Degeneration of cervical intervertebral disc    • Depression    • Diverticulitis 2008   • Diverticulosis of colon    • Gastritis    • GERD (gastroesophageal reflux disease)    • Hiatal hernia    • Hypertension    • IBS (irritable bowel syndrome)    • Insomnia    • Obstructive sleep apnea    • Osteoarthritis    • Reflux esophagitis    • Solitary thyroid nodule        Past Surgical History:   Procedure Laterality Date   • CATARACT EXTRACTION Bilateral    • COLON RESECTION N/A 03/04/2008    Sigmoid colon resection with low pelvic anastomosis, right [id number]. Stepehn Kelty, Located within [facility]   • CYSTOCELE REPAIR N/A 06/2017   • ENDOSCOPY N/A 4/2/2018    Procedure: ESOPHAGOGASTRODUODENOSCOPY WITH COLD BIOPSIES;  Surgeon: Josiah Weber MD;  Location: [facility] ENDOSCOPY;  Service: Gastroenterology   • ENDOSCOPY AND COLONOSCOPY N/A 04/29/2014    Gastritis: biopsied, esophagitis: biopsied, normal colonoscopy-Dr. Darren Lancaster, Located within [facility]   • EXPLORATORY LAPAROTOMY N/A 5/31/2019    Procedure: exploratory laparotomy;  Surgeon: Silvano Alarcon MD;  Location:   [facility];  Service: General   • LAPAROSCOPIC CHOLECYSTECTOMY N/A 05/25/2010    Dr. Darren Lancaster, [facility]   • VAGINAL PROLAPSE REPAIR N/A     ALL PELVIC ORGAN PROLAPSE SURGERY   • VENTRAL HERNIA REPAIR N/A 11/03/2015    Repair of complex ventral incisional hernia with mesh-Dr. Darren Lancaster, [facility]       Scheduled Meds:  Outpatient Encounter Medications as of 8/26/2020   Medication Sig Dispense Refill   • amLODIPine (NORVASC) 5 MG tablet TAKE 1 TABLET DAILY 90 tablet 3   • ascorbic acid (VITAMIN C) 100 MG tablet Take 100 mg by mouth Daily.     • calcium carbonate (OS-JOSIANE) 600 MG tablet Take 600 mg by mouth 2 (Two) Times a Day With Meals.     • cholecalciferol (VITAMIN D3) 1000 units tablet Take 1,000 Units by mouth Daily.     • esomeprazole (nexIUM) 40 MG capsule Take 1 capsule by mouth Daily. Must schedule appointment for more refills 90 capsule 3   • fluticasone (FLONASE) 50 MCG/ACT nasal spray 2 sprays into each nostril Daily. 1 bottle 2   • LORazepam (ATIVAN) 1 MG tablet Take 1 tablet by mouth Daily As Needed for Anxiety. 90 tablet 0   • Multiple Vitamins-Minerals (WOMENS MULTI VITAMIN & MINERAL) tablet Take 1 tablet by mouth Daily.     • Probiotic Product (PROBIOTIC DAILY) capsule Take 1 capsule by mouth Daily.     • sulfamethoxazole-trimethoprim (BACTRIM DS,SEPTRA DS) 800-160 MG per tablet Take 1 tablet by mouth 2 (Two) Times a Day. 14 tablet 0   • vitamin B-12 (CYANOCOBALAMIN) 500 MCG tablet Take 500 mcg by mouth Daily.     • Wheat Dextrin (BENEFIBER PO) Take 1 tablet by mouth Daily.     • [DISCONTINUED] esomeprazole (nexIUM) 40 MG capsule Take 1 capsule by mouth Daily. Must schedule appointment for more refills 90 capsule 0     No facility-administered encounter medications on file as of 8/26/2020.        Continuous Infusions:  No current facility-administered medications for this visit.     PRN Meds:.    Allergies   Allergen Reactions   • Amitriptyline      Other reaction(s): alertness   • Bupropion      Other  reaction(s): insomnia   • Pneumococcal Vaccines      Other reaction(s): Dizziness       Social History     Socioeconomic History   • Marital status:      Spouse name: Not on file   • Number of children: Not on file   • Years of education: Not on file   • Highest education level: Not on file   Tobacco Use   • Smoking status: Former Smoker     Packs/day: 1.50     Years: 20.00     Pack years: 30.00     Types: Cigarettes   • Smokeless tobacco: Never Used   • Tobacco comment: quit x 30 years   Substance and Sexual Activity   • Alcohol use: No     Frequency: Never   • Drug use: No   • Sexual activity: Never       Family History   Problem Relation Age of Onset   • Cancer Mother         Breast cancer   • Hypertension Father    • Stroke Father    • Hypertension Brother    • Cancer Maternal Grandmother         breast       Review of Systems   Constitutional: Negative for activity change, appetite change, fatigue, fever and unexpected weight change.   HENT: Negative for trouble swallowing.    Respiratory: Negative for apnea, cough, choking, chest tightness, shortness of breath and wheezing.    Cardiovascular: Negative for chest pain, palpitations and leg swelling.   Gastrointestinal: Negative for abdominal distention, abdominal pain, anal bleeding, blood in stool, constipation, diarrhea, nausea, rectal pain and vomiting.       Vitals:    08/26/20 0905   Temp: 98.2 °F (36.8 °C)       Physical Exam   Constitutional: She is oriented to person, place, and time. She appears well-developed and well-nourished.   Eyes: Pupils are equal, round, and reactive to light.   Cardiovascular: Normal rate, regular rhythm and normal heart sounds.   Pulmonary/Chest: Effort normal and breath sounds normal. No respiratory distress. She has no wheezes.   Abdominal: Soft. Bowel sounds are normal. She exhibits no distension and no mass. There is no tenderness. There is no guarding. No hernia.   Musculoskeletal: Normal range of motion.    Neurological: She is alert and oriented to person, place, and time.   Skin: Skin is warm and dry. Capillary refill takes less than 2 seconds.   Psychiatric: She has a normal mood and affect. Her behavior is normal.       No radiology results for the last 7 days    Brittny was seen today for heartburn.    Diagnoses and all orders for this visit:    Gastroesophageal reflux disease, esophagitis presence not specified    Irritable bowel syndrome with diarrhea    History of small bowel obstruction    Other orders  -     esomeprazole (nexIUM) 40 MG capsule; Take 1 capsule by mouth Daily. Must schedule appointment for more refills    Assessment/plan    Pleasant 85-year-old female seen today in follow-up with a history of GERD, irritable bowel syndrome with diarrhea currently stable, and history of small bowel obstruction.    Stable GERD, continue PPI therapy.  Antireflux dietary precautions reviewed with patient.  Patient does have a history of reflux esophagitis with intestinal metaplasia on biopsy.  I will discuss the need for follow-up upper endoscopy with Dr. Young.  Upon review of records/Dr. Young's notes looks like patient had prior diagnosis of Abreu's esophagus however biopsies at that time were of the GE junction which of course includes gastric tissue or intestinal metaplasia is not uncommon.  Imaging showed less than 5 mm of abnormal mucosa inconsistent with true Abreu's.  Further recommendations pending discussion with Dr. Young.  Return to clinic 1 year or sooner if needed.

## 2020-09-08 ENCOUNTER — OFFICE VISIT (OUTPATIENT)
Dept: FAMILY MEDICINE CLINIC | Facility: CLINIC | Age: 85
End: 2020-09-08

## 2020-09-08 VITALS
RESPIRATION RATE: 16 BRPM | SYSTOLIC BLOOD PRESSURE: 120 MMHG | BODY MASS INDEX: 20.28 KG/M2 | HEIGHT: 64 IN | DIASTOLIC BLOOD PRESSURE: 84 MMHG | WEIGHT: 118.8 LBS | OXYGEN SATURATION: 98 % | HEART RATE: 71 BPM | TEMPERATURE: 97.7 F

## 2020-09-08 DIAGNOSIS — K31.A0 INTESTINAL METAPLASIA OF GASTRIC CARDIA: Primary | ICD-10-CM

## 2020-09-08 DIAGNOSIS — Z87.19 HISTORY OF BARRETT'S ESOPHAGUS: ICD-10-CM

## 2020-09-08 DIAGNOSIS — I10 BENIGN ESSENTIAL HYPERTENSION: Primary | ICD-10-CM

## 2020-09-08 DIAGNOSIS — Z23 ENCOUNTER FOR IMMUNIZATION: ICD-10-CM

## 2020-09-08 DIAGNOSIS — F41.1 GENERALIZED ANXIETY DISORDER: ICD-10-CM

## 2020-09-08 DIAGNOSIS — K21.00 GASTROESOPHAGEAL REFLUX DISEASE WITH ESOPHAGITIS: ICD-10-CM

## 2020-09-08 DIAGNOSIS — K58.0 IRRITABLE BOWEL SYNDROME WITH DIARRHEA: ICD-10-CM

## 2020-09-08 DIAGNOSIS — K21.9 GASTROESOPHAGEAL REFLUX DISEASE, ESOPHAGITIS PRESENCE NOT SPECIFIED: ICD-10-CM

## 2020-09-08 DIAGNOSIS — G47.00 INSOMNIA, UNSPECIFIED TYPE: ICD-10-CM

## 2020-09-08 DIAGNOSIS — E04.2 NON-TOXIC MULTINODULAR GOITER: ICD-10-CM

## 2020-09-08 PROCEDURE — 90653 IIV ADJUVANT VACCINE IM: CPT | Performed by: INTERNAL MEDICINE

## 2020-09-08 PROCEDURE — G0439 PPPS, SUBSEQ VISIT: HCPCS | Performed by: INTERNAL MEDICINE

## 2020-09-08 PROCEDURE — G0008 ADMIN INFLUENZA VIRUS VAC: HCPCS | Performed by: INTERNAL MEDICINE

## 2020-09-08 PROCEDURE — 99214 OFFICE O/P EST MOD 30 MIN: CPT | Performed by: INTERNAL MEDICINE

## 2020-09-08 RX ORDER — LORAZEPAM 1 MG/1
1 TABLET ORAL DAILY PRN
Qty: 90 TABLET | Refills: 0 | Status: SHIPPED | OUTPATIENT
Start: 2020-09-08 | End: 2020-09-08 | Stop reason: SDUPTHER

## 2020-09-08 RX ORDER — LORAZEPAM 1 MG/1
1 TABLET ORAL DAILY PRN
Qty: 90 TABLET | Refills: 0 | Status: SHIPPED | OUTPATIENT
Start: 2020-09-08 | End: 2021-03-16 | Stop reason: SDUPTHER

## 2020-09-08 NOTE — PROGRESS NOTES
"Subjective   Brittny Fay is a 85 y.o. female who comes in today for   Chief Complaint   Patient presents with   • Establish Care   • annual wellness visit   .    History of Present Illness   She recently moved near  and she is changing to me that is doctor closer to her home.  Her PMH is + for Barretts esophagus and sees Dr. Young,  Has h/o diverticular diseasea and SBO and has had surgery on both large (2010) and small bowel (2019).  H/o IBS.  Has 2-3 normal BM/day --no blood in them.  Has interstitial cystitis and has some trouble emptying her bladder completely.  Has OAB as well. Urinates at night and keeps her awake.   Has had a recent UTI and went to  and then followed with a urologist.  She is newly sexually active since moving into the [facility].  Walks 2 miles daily with her boyfriend  Has insomnia and anxiety and takes ativan 1 mg 1/2 tab qhs and then the other 1/2 in the middle of night.  Often wakes to urinate and then takes the ativan.  Has been on ativan for years.  Has 10 left.  Goes through mail order.  Moved here from [place]  20 years ago to be closer to her daughter.   Has some baseline anxiety.   Has tried OAB meds in past and none helped  The following portions of the patient's history were reviewed and updated as appropriate: allergies, current medications, past family history, past medical history, past social history, past surgical history and problem list.    Review of Systems   Constitutional: Negative.    Gastrointestinal: Negative.    Genitourinary: Positive for frequency (at night).   Psychiatric/Behavioral: Positive for sleep disturbance. The patient is nervous/anxious.        /84   Pulse 71   Temp 97.7 °F (36.5 °C) (Temporal)   Resp 16   Ht 162.6 cm (64\")   Wt 53.9 kg (118 lb 12.8 oz)   SpO2 98%   BMI 20.39 kg/m²     STEADI Fall Risk Assessment was completed, and patient is at LOW risk for falls.Assessment completed on:9/8/2020    PHQ-2/PHQ-9 Depression " Screening 9/8/2020   Little interest or pleasure in doing things 0   Feeling down, depressed, or hopeless 0   Total Score 0       Objective   Physical Exam   Constitutional: She is oriented to person, place, and time. She appears well-developed and well-nourished.   HENT:   Head: Normocephalic and atraumatic.   Eyes: Conjunctivae are normal.   Neck: Neck supple.   Cardiovascular: Normal rate, regular rhythm and normal heart sounds.   No bruits   Pulmonary/Chest: Effort normal and breath sounds normal. No respiratory distress. She has no wheezes. She has no rales.   Abdominal: Soft. Bowel sounds are normal. She exhibits no distension and no mass. There is no tenderness.   Lymphadenopathy:     She has no cervical adenopathy.   Neurological: She is alert and oriented to person, place, and time.   Skin: Skin is warm.   Psychiatric: She has a normal mood and affect. Her behavior is normal. Judgment and thought content normal.   Nursing note and vitals reviewed.        Current Outpatient Medications:   •  amLODIPine (NORVASC) 5 MG tablet, TAKE 1 TABLET DAILY, Disp: 90 tablet, Rfl: 3  •  ascorbic acid (VITAMIN C) 100 MG tablet, Take 100 mg by mouth Daily., Disp: , Rfl:   •  CALCIUM CITRATE PO, Take 1 tablet by mouth Daily., Disp: , Rfl:   •  cholecalciferol (VITAMIN D3) 1000 units tablet, Take 1,000 Units by mouth Daily., Disp: , Rfl:   •  esomeprazole (nexIUM) 40 MG capsule, Take 1 capsule by mouth Daily. Must schedule appointment for more refills, Disp: 90 capsule, Rfl: 3  •  fluticasone (FLONASE) 50 MCG/ACT nasal spray, 2 sprays into each nostril Daily., Disp: 1 bottle, Rfl: 2  •  LORazepam (ATIVAN) 1 MG tablet, Take 1 tablet by mouth Daily As Needed for Anxiety., Disp: 90 tablet, Rfl: 0  •  Multiple Vitamins-Minerals (WOMENS MULTI VITAMIN & MINERAL) tablet, Take 1 tablet by mouth Daily., Disp: , Rfl:   •  Probiotic Product (PROBIOTIC DAILY) capsule, Take 1 capsule by mouth Daily., Disp: , Rfl:   •  vitamin B-12  (CYANOCOBALAMIN) 500 MCG tablet, Take 500 mcg by mouth Daily., Disp: , Rfl:   •  Wheat Dextrin (BENEFIBER PO), Take 1 tablet by mouth Daily., Disp: , Rfl:     Assessment/Plan   Brittny was seen today for establish care and annual wellness visit.    Diagnoses and all orders for this visit:    Benign essential hypertension  -     Comprehensive Metabolic Panel  -     CBC & Differential  -     Lipid Panel With LDL / HDL Ratio  -     TSH  -     T4, Free    Generalized anxiety disorder  -     Discontinue: LORazepam (ATIVAN) 1 MG tablet; Take 1 tablet by mouth Daily As Needed for Anxiety.  -     Comprehensive Metabolic Panel  -     CBC & Differential  -     Lipid Panel With LDL / HDL Ratio  -     TSH  -     T4, Free  -     LORazepam (ATIVAN) 1 MG tablet; Take 1 tablet by mouth Daily As Needed for Anxiety.    Encounter for immunization  -     Fluad Quad 65+ yrs ([id number])    Gastroesophageal reflux disease with esophagitis  -     Comprehensive Metabolic Panel  -     CBC & Differential  -     Lipid Panel With LDL / HDL Ratio  -     TSH  -     T4, Free    Non-toxic multinodular goiter  -     Comprehensive Metabolic Panel  -     CBC & Differential  -     Lipid Panel With LDL / HDL Ratio  -     TSH  -     T4, Free    Irritable bowel syndrome with diarrhea    Insomnia, unspecified type        Sees eye doc yearly and is overdue;  She will make her appt  MMG ordered  Flu shot today  Screens neg for depression, dementia and or falls  Refill ativan and cautioned on appropriate usage.  ernie and contract.  #90 to last 6 mo as she will take 1/2 tab qhs prn.  We discussed the fall risk and addiction risk, etc.  ernie is appropriate  Fasting labs ordered  BP is well controlled on amlodipine  barretts on nexium   Spent over 30 minutes gathering history and physical and reviewing records since she was a new patient to me and transfer within our system.   She Doesn't do CRC screening due to age.  Last CN was 2014.               I have  asked for the patient to return to clinic in 6month(s).

## 2020-09-08 NOTE — PROGRESS NOTES
The ABCs of the Annual Wellness Visit  Subsequent Medicare Wellness Visit    Chief Complaint   Patient presents with   • Establish Care   • annual wellness visit       Subjective   History of Present Illness:  Brittny Fay is a 85 y.o. female who presents for a Subsequent Medicare Wellness Visit.    HEALTH RISK ASSESSMENT    Recent Hospitalizations:  No hospitalization(s) within the last year.    Current Medical Providers:  Patient Care Team:  Remedios Sorto MD as PCP - General (Family Medicine)  Remedios Sorto MD as PCP - Claims Attributed  Nadya Vigil MD (Inactive) as Consulting Physician (Obstetrics and Gynecology)  Darren Lancaster MD as Consulting Physician (General Surgery)    Smoking Status:  Social History     Tobacco Use   Smoking Status Former Smoker   • Packs/day: 1.50   • Years: 20.00   • Pack years: 30.00   • Types: Cigarettes   Smokeless Tobacco Never Used   Tobacco Comment    quit 1970       Alcohol Consumption:  Social History     Substance and Sexual Activity   Alcohol Use Yes   • Alcohol/week: 5.0 standard drinks   • Types: 5 Glasses of wine per week   • Frequency: Never       Depression Screen:   PHQ-2/PHQ-9 Depression Screening 9/8/2020   Little interest or pleasure in doing things 0   Feeling down, depressed, or hopeless 0   Total Score 0       Fall Risk Screen:  KENNY Fall Risk Assessment was completed, and patient is at LOW risk for falls.Assessment completed on:9/8/2020    Health Habits and Functional and Cognitive Screening:  Functional & Cognitive Status 9/8/2020   Do you have difficulty preparing food and eating? No   Do you have difficulty bathing yourself, getting dressed or grooming yourself? No   Do you have difficulty using the toilet? No   Do you have difficulty moving around from place to place? No   Do you have trouble with steps or getting out of a bed or a chair? No   Current Diet Well Balanced Diet   Dental Exam Not up to date   Eye Exam Not up to date    Exercise (times per week) 3 times per week   Current Exercise Activities Include Cardiovasular Workout on Exercise Equipment   Do you need help using the phone?  No   Are you deaf or do you have serious difficulty hearing?  No   Do you need help with transportation? No   Do you need help shopping? No   Do you need help preparing meals?  No   Do you need help with housework?  No   Do you need help with laundry? No   Do you need help taking your medications? No   Do you need help managing money? No   Do you ever drive or ride in a car without wearing a seat belt? No   Have you felt unusual stress, anger or loneliness in the last month? No   Who do you live with? Community   If you need help, do you have trouble finding someone available to you? No   Have you been bothered in the last four weeks by sexual problems? No   Do you have difficulty concentrating, remembering or making decisions? No         Does the patient have evidence of cognitive impairment? No    Asprin use counseling:Does not need ASA (and currently is not on it)    Age-appropriate Screening Schedule:  Refer to the list below for future screening recommendations based on patient's age, sex and/or medical conditions. Orders for these recommended tests are listed in the plan section. The patient has been provided with a written plan.    Health Maintenance   Topic Date Due   • ZOSTER VACCINE (2 of 3) 12/10/2020 (Originally 5/16/2012)   • DXA SCAN  03/08/2021   • MAMMOGRAM  10/04/2021   • TDAP/TD VACCINES (3 - Td) 06/18/2022   • INFLUENZA VACCINE  Completed          The following portions of the patient's history were reviewed and updated as appropriate: allergies, current medications, past family history, past medical history, past social history, past surgical history and problem list.    Outpatient Medications Prior to Visit   Medication Sig Dispense Refill   • amLODIPine (NORVASC) 5 MG tablet TAKE 1 TABLET DAILY 90 tablet 3   • ascorbic acid (VITAMIN  C) 100 MG tablet Take 100 mg by mouth Daily.     • CALCIUM CITRATE PO Take 1 tablet by mouth Daily.     • cholecalciferol (VITAMIN D3) 1000 units tablet Take 1,000 Units by mouth Daily.     • esomeprazole (nexIUM) 40 MG capsule Take 1 capsule by mouth Daily. Must schedule appointment for more refills 90 capsule 3   • fluticasone (FLONASE) 50 MCG/ACT nasal spray 2 sprays into each nostril Daily. 1 bottle 2   • Multiple Vitamins-Minerals (WOMENS MULTI VITAMIN & MINERAL) tablet Take 1 tablet by mouth Daily.     • Probiotic Product (PROBIOTIC DAILY) capsule Take 1 capsule by mouth Daily.     • vitamin B-12 (CYANOCOBALAMIN) 500 MCG tablet Take 500 mcg by mouth Daily.     • Wheat Dextrin (BENEFIBER PO) Take 1 tablet by mouth Daily.     • LORazepam (ATIVAN) 1 MG tablet Take 1 tablet by mouth Daily As Needed for Anxiety. 90 tablet 0   • calcium carbonate (OS-JOSIANE) 600 MG tablet Take 600 mg by mouth 2 (Two) Times a Day With Meals.     • sulfamethoxazole-trimethoprim (BACTRIM DS,SEPTRA DS) 800-160 MG per tablet Take 1 tablet by mouth 2 (Two) Times a Day. 14 tablet 0     No facility-administered medications prior to visit.        Patient Active Problem List   Diagnosis   • Depression with anxiety   • Benign essential hypertension   • Overactive bladder   • Degeneration of intervertebral disc of cervical region   • Irritable bowel syndrome with diarrhea   • Gastroesophageal reflux disease   • Seasonal allergic rhinitis   • Elevated fasting glucose   • Osteopenia   • Non-toxic multinodular goiter   • Insomnia   • Hyperplastic adenomatous polyp of stomach   • Hiatal hernia   • Diastolic dysfunction   • Onychocryptosis   • Bladder prolapse, female, acquired   • Renal cyst, left   • History of resection of large bowel   • Family history of digestive disorder   • Pelvic floor dysfunction   • Intestinal metaplasia of gastric cardia   • Abreu's esophagus with dysplasia   • SBO (small bowel obstruction) (CMS/[facility])       Advanced Care  "Planning:  ACP discussion was held with the patient during this visit. Patient has an advance directive (not in EMR), copy requested.    Review of Systems    Compared to one year ago, the patient feels her physical health is better.  Compared to one year ago, the patient feels her mental health is the same.    Reviewed chart for potential of high risk medication in the elderly: yes  Reviewed chart for potential of harmful drug interactions in the elderly:yes    Objective         Vitals:    09/08/20 1058   BP: 120/84   Pulse: 71   Resp: 16   Temp: 97.7 °F (36.5 °C)   TempSrc: Temporal   SpO2: 98%   Weight: 53.9 kg (118 lb 12.8 oz)   Height: 162.6 cm (64\")   PainSc: 0-No pain       Body mass index is 20.39 kg/m².  Discussed the patient's BMI with her. The BMI is in the acceptable range.    Physical Exam          Assessment/Plan   Medicare Risks and Personalized Health Plan  CMS Preventative Services Quick Reference  Advance Directive Discussion  Breast Cancer/Mammogram Screening  Colon Cancer Screening  Dementia/Memory   Depression/Dysphoria  Diabetic Lab Screening   Fall Risk  Glaucoma Risk  Immunizations Discussed/Encouraged (specific immunizations; Influenza )    The above risks/problems have been discussed with the patient.  Pertinent information has been shared with the patient in the After Visit Summary.  Follow up plans and orders are seen below in the Assessment/Plan Section.    Diagnoses and all orders for this visit:    1. Benign essential hypertension (Primary)  -     Comprehensive Metabolic Panel  -     CBC & Differential  -     Lipid Panel With LDL / HDL Ratio  -     TSH  -     T4, Free    2. Generalized anxiety disorder  -     Discontinue: LORazepam (ATIVAN) 1 MG tablet; Take 1 tablet by mouth Daily As Needed for Anxiety.  Dispense: 90 tablet; Refill: 0  -     Comprehensive Metabolic Panel  -     CBC & Differential  -     Lipid Panel With LDL / HDL Ratio  -     TSH  -     T4, Free  -     LORazepam " (ATIVAN) 1 MG tablet; Take 1 tablet by mouth Daily As Needed for Anxiety.  Dispense: 90 tablet; Refill: 0    3. Encounter for immunization  -     Fluad Quad 65+ yrs ([id number])    4. Gastroesophageal reflux disease with esophagitis  -     Comprehensive Metabolic Panel  -     CBC & Differential  -     Lipid Panel With LDL / HDL Ratio  -     TSH  -     T4, Free    5. Non-toxic multinodular goiter  -     Comprehensive Metabolic Panel  -     CBC & Differential  -     Lipid Panel With LDL / HDL Ratio  -     TSH  -     T4, Free      Follow Up:  Return in about 6 months (around 3/8/2021).     An After Visit Summary and PPPS were given to the patient.       Sees eye doc yearly and is overdue;  She will make her appt  MMG ordered  Flu shot today  Screens neg for depression, dementia and or falls  Refill ativan and cautioned on appropriate usage.  ernie and contract.  #90 to last 6 mo as she will take 1/2 tab qhs prn.  We discussed the fall risk and addiction risk, etc.  ernie is appropriate  Fasting labs ordered  BP is well controlled on amlodipine  barretts on nexium   Spent over 30 minutes gathering history and physical and reviewing records since she was a new patient to me and transfer within our system.   She Doesn't do CRC screening due to age.  Last CN was 2014.

## 2020-09-09 ENCOUNTER — TELEPHONE (OUTPATIENT)
Dept: GASTROENTEROLOGY | Facility: CLINIC | Age: 85
End: 2020-09-09

## 2020-09-09 LAB
ALBUMIN SERPL-MCNC: 4.9 G/DL (ref 3.5–5.2)
ALBUMIN/GLOB SERPL: 2.6 G/DL
ALP SERPL-CCNC: 79 U/L (ref 39–117)
ALT SERPL-CCNC: 10 U/L (ref 1–33)
AST SERPL-CCNC: 16 U/L (ref 1–32)
BASOPHILS # BLD AUTO: 0.04 10*3/MM3 (ref 0–0.2)
BASOPHILS NFR BLD AUTO: 0.6 % (ref 0–1.5)
BILIRUB SERPL-MCNC: 0.5 MG/DL (ref 0–1.2)
BUN SERPL-MCNC: 15 MG/DL (ref 8–23)
BUN/CREAT SERPL: 18.8 (ref 7–25)
CALCIUM SERPL-MCNC: 9.3 MG/DL (ref 8.6–10.5)
CHLORIDE SERPL-SCNC: 102 MMOL/L (ref 98–107)
CHOLEST SERPL-MCNC: 180 MG/DL (ref 0–200)
CO2 SERPL-SCNC: 27.2 MMOL/L (ref 22–29)
CREAT SERPL-MCNC: 0.8 MG/DL (ref 0.57–1)
EOSINOPHIL # BLD AUTO: 0.22 10*3/MM3 (ref 0–0.4)
EOSINOPHIL NFR BLD AUTO: 3.4 % (ref 0.3–6.2)
ERYTHROCYTE [DISTWIDTH] IN BLOOD BY AUTOMATED COUNT: 13.3 % (ref 12.3–15.4)
GLOBULIN SER CALC-MCNC: 1.9 GM/DL
GLUCOSE SERPL-MCNC: 94 MG/DL (ref 65–99)
HCT VFR BLD AUTO: 43.9 % (ref 34–46.6)
HDLC SERPL-MCNC: 75 MG/DL (ref 40–60)
HGB BLD-MCNC: 13.9 G/DL (ref 12–15.9)
IMM GRANULOCYTES # BLD AUTO: 0.01 10*3/MM3 (ref 0–0.05)
IMM GRANULOCYTES NFR BLD AUTO: 0.2 % (ref 0–0.5)
LDLC SERPL CALC-MCNC: 87 MG/DL (ref 0–100)
LDLC/HDLC SERPL: 1.15 {RATIO}
LYMPHOCYTES # BLD AUTO: 2 10*3/MM3 (ref 0.7–3.1)
LYMPHOCYTES NFR BLD AUTO: 30.7 % (ref 19.6–45.3)
MCH RBC QN AUTO: 28.3 PG (ref 26.6–33)
MCHC RBC AUTO-ENTMCNC: 31.7 G/DL (ref 31.5–35.7)
MCV RBC AUTO: 89.4 FL (ref 79–97)
MONOCYTES # BLD AUTO: 0.66 10*3/MM3 (ref 0.1–0.9)
MONOCYTES NFR BLD AUTO: 10.1 % (ref 5–12)
NEUTROPHILS # BLD AUTO: 3.59 10*3/MM3 (ref 1.7–7)
NEUTROPHILS NFR BLD AUTO: 55 % (ref 42.7–76)
NRBC BLD AUTO-RTO: 0 /100 WBC (ref 0–0.2)
PLATELET # BLD AUTO: 231 10*3/MM3 (ref 140–450)
POTASSIUM SERPL-SCNC: 4.2 MMOL/L (ref 3.5–5.2)
PROT SERPL-MCNC: 6.8 G/DL (ref 6–8.5)
RBC # BLD AUTO: 4.91 10*6/MM3 (ref 3.77–5.28)
SODIUM SERPL-SCNC: 141 MMOL/L (ref 136–145)
T4 FREE SERPL-MCNC: 1.17 NG/DL (ref 0.93–1.7)
TRIGL SERPL-MCNC: 92 MG/DL (ref 0–150)
TSH SERPL DL<=0.005 MIU/L-ACNC: 1.01 UIU/ML (ref 0.27–4.2)
VLDLC SERPL CALC-MCNC: 18.4 MG/DL
WBC # BLD AUTO: 6.52 10*3/MM3 (ref 3.4–10.8)

## 2020-09-09 NOTE — TELEPHONE ENCOUNTER
----- Message from SYDNI Mustafa sent at 9/9/2020  1:04 PM EDT -----  Regarding: FW: PREPRECTION ISSUE WITH NEXIUM  Can we follow-up on this patient's Nexium please.  ----- Message -----  From: Martha Okeefe RegSched Rep  Sent: 9/9/2020   9:15 AM EDT  To: SYDNI Mustafa  Subject: PREPRECTION ISSUE WITH NEXIUM                    PT CALL WAS SEEN IN OFFICE ON 8/26. GIVEN NEXIUM FOR MAIL ORDER, BUT FOUND OUT NEEDED PRIOR AURTHORIZATION BECAUSE OF LIMIT ON AMOUNT.  NEED US TO CALL [phone] AND TALK TO THEM.  RT PHONE  [phone]

## 2020-11-11 ENCOUNTER — TELEPHONE (OUTPATIENT)
Dept: GASTROENTEROLOGY | Facility: CLINIC | Age: 85
End: 2020-11-11

## 2020-11-11 NOTE — TELEPHONE ENCOUNTER
Returned patient's phone call. She questions if its ok she postpone getting an EGD until after the covid numbers decrease. Advised will send an update to Kisha. She verb understanding.

## 2020-11-11 NOTE — TELEPHONE ENCOUNTER
----- Message from Michael Reynolds sent at 11/11/2020  1:41 PM EST -----  Regarding: Questions  Contact: [phone]  PT wants to know if EGD is necessary she does not want to have procedure done during pandemic. Please call riya

## 2020-11-12 ENCOUNTER — TRANSCRIBE ORDERS (OUTPATIENT)
Dept: ADMINISTRATIVE | Facility: HOSPITAL | Age: 85
End: 2020-11-12

## 2020-11-12 DIAGNOSIS — Z12.31 SCREENING MAMMOGRAM, ENCOUNTER FOR: Primary | ICD-10-CM

## 2020-11-12 NOTE — TELEPHONE ENCOUNTER
Dr. Young says that is fine.  Lets have her follow-up back in the office with myself or Dr. Young in 3-6 months to discuss further.  Cancel EGD for now.

## 2020-11-16 NOTE — TELEPHONE ENCOUNTER
Patient called, advised as per Kisha's note. She verb understanding and will call back for an appointment.

## 2020-12-02 ENCOUNTER — RESULTS ENCOUNTER (OUTPATIENT)
Dept: INTERNAL MEDICINE | Facility: CLINIC | Age: 85
End: 2020-12-02

## 2020-12-02 DIAGNOSIS — K21.00 GASTROESOPHAGEAL REFLUX DISEASE WITH ESOPHAGITIS: ICD-10-CM

## 2020-12-02 DIAGNOSIS — I10 BENIGN ESSENTIAL HYPERTENSION: ICD-10-CM

## 2020-12-02 DIAGNOSIS — R73.01 ELEVATED FASTING GLUCOSE: ICD-10-CM

## 2021-01-15 ENCOUNTER — APPOINTMENT (OUTPATIENT)
Dept: MAMMOGRAPHY | Facility: HOSPITAL | Age: 86
End: 2021-01-15

## 2021-02-04 ENCOUNTER — TELEPHONE (OUTPATIENT)
Dept: GASTROENTEROLOGY | Facility: CLINIC | Age: 86
End: 2021-02-04

## 2021-02-04 PROBLEM — Z87.19 HISTORY OF BARRETT'S ESOPHAGUS: Status: ACTIVE | Noted: 2021-02-04

## 2021-02-18 ENCOUNTER — APPOINTMENT (OUTPATIENT)
Dept: MAMMOGRAPHY | Facility: HOSPITAL | Age: 86
End: 2021-02-18

## 2021-03-02 ENCOUNTER — TRANSCRIBE ORDERS (OUTPATIENT)
Dept: SLEEP MEDICINE | Facility: HOSPITAL | Age: 86
End: 2021-03-02

## 2021-03-02 DIAGNOSIS — Z01.818 OTHER SPECIFIED PRE-OPERATIVE EXAMINATION: Primary | ICD-10-CM

## 2021-03-08 DIAGNOSIS — I10 BENIGN ESSENTIAL HYPERTENSION: ICD-10-CM

## 2021-03-08 DIAGNOSIS — E78.89 ELEVATED HDL: ICD-10-CM

## 2021-03-08 DIAGNOSIS — I10 BENIGN ESSENTIAL HYPERTENSION: Primary | ICD-10-CM

## 2021-03-08 DIAGNOSIS — R73.01 ELEVATED FASTING GLUCOSE: ICD-10-CM

## 2021-03-10 LAB
ALBUMIN SERPL-MCNC: 4.6 G/DL (ref 3.5–5.2)
ALBUMIN/GLOB SERPL: 2 G/DL
ALP SERPL-CCNC: 75 U/L (ref 39–117)
ALT SERPL-CCNC: 9 U/L (ref 1–33)
AST SERPL-CCNC: 18 U/L (ref 1–32)
BASOPHILS # BLD AUTO: 0.04 10*3/MM3 (ref 0–0.2)
BASOPHILS NFR BLD AUTO: 0.6 % (ref 0–1.5)
BILIRUB SERPL-MCNC: 0.5 MG/DL (ref 0–1.2)
BUN SERPL-MCNC: 20 MG/DL (ref 8–23)
BUN/CREAT SERPL: 25 (ref 7–25)
CALCIUM SERPL-MCNC: 9.8 MG/DL (ref 8.6–10.5)
CHLORIDE SERPL-SCNC: 101 MMOL/L (ref 98–107)
CHOLEST SERPL-MCNC: 178 MG/DL (ref 0–200)
CO2 SERPL-SCNC: 30.3 MMOL/L (ref 22–29)
CREAT SERPL-MCNC: 0.8 MG/DL (ref 0.57–1)
EOSINOPHIL # BLD AUTO: 0.19 10*3/MM3 (ref 0–0.4)
EOSINOPHIL NFR BLD AUTO: 2.7 % (ref 0.3–6.2)
ERYTHROCYTE [DISTWIDTH] IN BLOOD BY AUTOMATED COUNT: 12.2 % (ref 12.3–15.4)
GLOBULIN SER CALC-MCNC: 2.3 GM/DL
GLUCOSE SERPL-MCNC: 96 MG/DL (ref 65–99)
HBA1C MFR BLD: 5.5 % (ref 4.8–5.6)
HCT VFR BLD AUTO: 42.6 % (ref 34–46.6)
HDLC SERPL-MCNC: 78 MG/DL (ref 40–60)
HGB BLD-MCNC: 14.2 G/DL (ref 12–15.9)
IMM GRANULOCYTES # BLD AUTO: 0.02 10*3/MM3 (ref 0–0.05)
IMM GRANULOCYTES NFR BLD AUTO: 0.3 % (ref 0–0.5)
LDLC SERPL CALC-MCNC: 88 MG/DL (ref 0–100)
LDLC/HDLC SERPL: 1.12 {RATIO}
LYMPHOCYTES # BLD AUTO: 2.24 10*3/MM3 (ref 0.7–3.1)
LYMPHOCYTES NFR BLD AUTO: 32.4 % (ref 19.6–45.3)
MCH RBC QN AUTO: 29.5 PG (ref 26.6–33)
MCHC RBC AUTO-ENTMCNC: 33.3 G/DL (ref 31.5–35.7)
MCV RBC AUTO: 88.4 FL (ref 79–97)
MONOCYTES # BLD AUTO: 0.65 10*3/MM3 (ref 0.1–0.9)
MONOCYTES NFR BLD AUTO: 9.4 % (ref 5–12)
NEUTROPHILS # BLD AUTO: 3.78 10*3/MM3 (ref 1.7–7)
NEUTROPHILS NFR BLD AUTO: 54.6 % (ref 42.7–76)
NRBC BLD AUTO-RTO: 0 /100 WBC (ref 0–0.2)
PLATELET # BLD AUTO: 250 10*3/MM3 (ref 140–450)
POTASSIUM SERPL-SCNC: 4.2 MMOL/L (ref 3.5–5.2)
PROT SERPL-MCNC: 6.9 G/DL (ref 6–8.5)
RBC # BLD AUTO: 4.82 10*6/MM3 (ref 3.77–5.28)
SODIUM SERPL-SCNC: 142 MMOL/L (ref 136–145)
T4 FREE SERPL-MCNC: 1.12 NG/DL (ref 0.93–1.7)
TRIGL SERPL-MCNC: 64 MG/DL (ref 0–150)
TSH SERPL DL<=0.005 MIU/L-ACNC: 1.07 UIU/ML (ref 0.27–4.2)
VLDLC SERPL CALC-MCNC: 12 MG/DL (ref 5–40)
WBC # BLD AUTO: 6.92 10*3/MM3 (ref 3.4–10.8)

## 2021-03-12 ENCOUNTER — LAB (OUTPATIENT)
Dept: LAB | Facility: HOSPITAL | Age: 86
End: 2021-03-12

## 2021-03-12 DIAGNOSIS — Z01.818 OTHER SPECIFIED PRE-OPERATIVE EXAMINATION: ICD-10-CM

## 2021-03-12 PROCEDURE — C9803 HOPD COVID-19 SPEC COLLECT: HCPCS

## 2021-03-12 PROCEDURE — U0005 INFEC AGEN DETEC AMPLI PROBE: HCPCS

## 2021-03-12 PROCEDURE — U0004 COV-19 TEST NON-CDC HGH THRU: HCPCS

## 2021-03-13 LAB — SARS-COV-2 ORF1AB RESP QL NAA+PROBE: NOT DETECTED

## 2021-03-15 ENCOUNTER — HOSPITAL ENCOUNTER (OUTPATIENT)
Facility: HOSPITAL | Age: 86
Setting detail: HOSPITAL OUTPATIENT SURGERY
Discharge: HOME | End: 2021-03-15
Attending: INTERNAL MEDICINE | Admitting: INTERNAL MEDICINE

## 2021-03-15 ENCOUNTER — ANESTHESIA EVENT (OUTPATIENT)
Dept: GASTROENTEROLOGY | Facility: HOSPITAL | Age: 86
End: 2021-03-15

## 2021-03-15 ENCOUNTER — ANESTHESIA (OUTPATIENT)
Dept: GASTROENTEROLOGY | Facility: HOSPITAL | Age: 86
End: 2021-03-15

## 2021-03-15 VITALS
WEIGHT: 121 LBS | BODY MASS INDEX: 20.66 KG/M2 | HEART RATE: 68 BPM | TEMPERATURE: 98.3 F | OXYGEN SATURATION: 98 % | HEIGHT: 64 IN | RESPIRATION RATE: 14 BRPM | SYSTOLIC BLOOD PRESSURE: 141 MMHG | DIASTOLIC BLOOD PRESSURE: 87 MMHG

## 2021-03-15 DIAGNOSIS — K31.A0 INTESTINAL METAPLASIA OF GASTRIC CARDIA: ICD-10-CM

## 2021-03-15 DIAGNOSIS — K21.9 GASTROESOPHAGEAL REFLUX DISEASE, ESOPHAGITIS PRESENCE NOT SPECIFIED: ICD-10-CM

## 2021-03-15 DIAGNOSIS — K21.9 GASTROESOPHAGEAL REFLUX DISEASE: ICD-10-CM

## 2021-03-15 DIAGNOSIS — Z87.19 HISTORY OF BARRETT'S ESOPHAGUS: ICD-10-CM

## 2021-03-15 PROCEDURE — 25010000002 PROPOFOL 10 MG/ML EMULSION: Performed by: ANESTHESIOLOGY

## 2021-03-15 PROCEDURE — S0260 H&P FOR SURGERY: HCPCS | Performed by: INTERNAL MEDICINE

## 2021-03-15 PROCEDURE — 43235 EGD DIAGNOSTIC BRUSH WASH: CPT | Performed by: INTERNAL MEDICINE

## 2021-03-15 RX ORDER — SODIUM CHLORIDE 0.9 % (FLUSH) 0.9 %
3 SYRINGE (ML) INJECTION EVERY 12 HOURS SCHEDULED
Status: DISCONTINUED | OUTPATIENT
Start: 2021-03-15 | End: 2021-03-15 | Stop reason: HOSPADM

## 2021-03-15 RX ORDER — PROPOFOL 10 MG/ML
VIAL (ML) INTRAVENOUS AS NEEDED
Status: DISCONTINUED | OUTPATIENT
Start: 2021-03-15 | End: 2021-03-15 | Stop reason: SURG

## 2021-03-15 RX ORDER — SODIUM CHLORIDE, SODIUM LACTATE, POTASSIUM CHLORIDE, CALCIUM CHLORIDE 600; 310; 30; 20 MG/100ML; MG/100ML; MG/100ML; MG/100ML
30 INJECTION, SOLUTION INTRAVENOUS CONTINUOUS PRN
Status: DISCONTINUED | OUTPATIENT
Start: 2021-03-15 | End: 2021-03-15 | Stop reason: HOSPADM

## 2021-03-15 RX ORDER — GLYCOPYRROLATE 0.2 MG/ML
INJECTION INTRAMUSCULAR; INTRAVENOUS AS NEEDED
Status: DISCONTINUED | OUTPATIENT
Start: 2021-03-15 | End: 2021-03-15 | Stop reason: SURG

## 2021-03-15 RX ORDER — SODIUM CHLORIDE 0.9 % (FLUSH) 0.9 %
10 SYRINGE (ML) INJECTION AS NEEDED
Status: DISCONTINUED | OUTPATIENT
Start: 2021-03-15 | End: 2021-03-15 | Stop reason: HOSPADM

## 2021-03-15 RX ORDER — LIDOCAINE HYDROCHLORIDE 20 MG/ML
INJECTION, SOLUTION INFILTRATION; PERINEURAL AS NEEDED
Status: DISCONTINUED | OUTPATIENT
Start: 2021-03-15 | End: 2021-03-15 | Stop reason: SURG

## 2021-03-15 RX ORDER — PROPOFOL 10 MG/ML
VIAL (ML) INTRAVENOUS CONTINUOUS PRN
Status: DISCONTINUED | OUTPATIENT
Start: 2021-03-15 | End: 2021-03-15 | Stop reason: SURG

## 2021-03-15 RX ADMIN — PROPOFOL 60 MG: 10 INJECTION, EMULSION INTRAVENOUS at 10:36

## 2021-03-15 RX ADMIN — SODIUM CHLORIDE, POTASSIUM CHLORIDE, SODIUM LACTATE AND CALCIUM CHLORIDE 30 ML/HR: 600; 310; 30; 20 INJECTION, SOLUTION INTRAVENOUS at 10:01

## 2021-03-15 RX ADMIN — LIDOCAINE HYDROCHLORIDE 60 MG: 20 INJECTION, SOLUTION INFILTRATION; PERINEURAL at 10:36

## 2021-03-15 RX ADMIN — PROPOFOL 140 MCG/KG/MIN: 10 INJECTION, EMULSION INTRAVENOUS at 10:36

## 2021-03-15 RX ADMIN — GLYCOPYRROLATE 0.1 MG: 1 INJECTION INTRAMUSCULAR; INTRAVENOUS at 10:31

## 2021-03-15 NOTE — BRIEF OP NOTE
ESOPHAGOGASTRODUODENOSCOPY  Progress Note    Brittny Fay  3/15/2021    Pre-op Diagnosis:   Intestinal metaplasia of gastric cardia [K31.89]  History of Abreu's esophagus [Z87.19]  Gastroesophageal reflux disease, esophagitis presence not specified [K21.9]       Post-Op Diagnosis Codes:     * Intestinal metaplasia of gastric cardia [K31.89]     * History of Abreu's esophagus [Z87.19]     * Gastroesophageal reflux disease, esophagitis presence not specified [K21.9]     * Hiatal hernia [K44.9]    Procedure/CPT® Codes:        Procedure(s):  ESOPHAGOGASTRODUODENOSCOPY    Surgeon(s):  Julio Young MD    Anesthesia: Monitored Anesthesia Care    Staff:   Endo Technician: Brittny Bullard  Endo Nurse: Renee Christensen RN         Estimated Blood Loss: none    Urine Voided: * No values recorded between 3/15/2021 10:25 AM and 3/15/2021 10:41 AM *    Specimens:                None          Drains: * No LDAs found *    Findings: EGD with normal esophagus to the GE junction.  A small sliding hiatal hernia was identified with irregular Z-line narrowband imaging shows no evidence of Abreu's esophagus.  Gastric mucosa was normal throughout.  Duodenal mucosa was normal as well.    Complications: None          Julio Young MD     Date: 3/15/2021  Time: 10:43 EDT

## 2021-03-15 NOTE — H&P
[facility]  Pre Procedure History & Physical    Chief Complaint:   Abreu's esophagus    Subjective     HPI:   Patient 86-year-old female with history of hypertension, hiatal hernia and Abreu's esophagus here for surveillance.  Patient denies significant reflux symptoms on current therapy but has noticed some hoarseness over the last several weeks.  Patient here for EGD.    Past Medical History:   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   • Bladder prolapse, female, acquired    • Chest pain    • Chronic interstitial cystitis    • Colon polyps 09/23/2015   • Degeneration of cervical intervertebral disc    • Depression    • Diverticulitis 2008   • Diverticulosis of colon    • Gastritis    • GERD (gastroesophageal reflux disease)    • Hiatal hernia    • Hypertension    • IBS (irritable bowel syndrome)    • Insomnia    • Obstructive sleep apnea    • Osteoarthritis    • Reflux esophagitis    • Solitary thyroid nodule        Past Surgical History:  Past Surgical History:   Procedure Laterality Date   • CATARACT EXTRACTION Bilateral    • COLON RESECTION N/A 03/04/2008    Sigmoid colon resection with low pelvic anastomosis, right [id number]. Stepehn Kelty, [facility]   • COLON RESECTION  2019    small bowel resection   • CYSTOCELE REPAIR N/A 06/2017   • ENDOSCOPY N/A 4/2/2018    Procedure: ESOPHAGOGASTRODUODENOSCOPY WITH COLD BIOPSIES;  Surgeon: Josiah Weber MD;  Location: [facility] ENDOSCOPY;  Service: Gastroenterology   • ENDOSCOPY AND COLONOSCOPY N/A 04/29/2014    Gastritis: biopsied, esophagitis: biopsied, normal colonoscopy-Dr. Darren Lancaster, [facility]   • EXPLORATORY LAPAROTOMY N/A 5/31/2019    Procedure: exploratory laparotomy;  Surgeon: Silvano Alarcon MD;  Location: [facility] MAIN OR;  Service: General   • LAPAROSCOPIC CHOLECYSTECTOMY N/A 05/25/2010    Dr. Darren Lancaster, [facility]   • SMALL INTESTINE SURGERY  N/A 2019    emergent due to SBO   • VAGINAL PROLAPSE REPAIR N/A     ALL PELVIC ORGAN PROLAPSE SURGERY   • VENTRAL HERNIA REPAIR N/A 11/03/2015    Repair of complex ventral incisional hernia with mesh-Dr. Darren Lancaster, [facility]       Family History:  Family History   Problem Relation Age of Onset   • Cancer Mother         Breast cancer   • Hypertension Father    • Stroke Father    • Hypertension Brother    • Cancer Maternal Grandmother         breast   • Malig Hyperthermia Neg Hx        Social History:   reports that she has quit smoking. Her smoking use included cigarettes. She has a 30.00 pack-year smoking history. She has never used smokeless tobacco. She reports current alcohol use of about 5.0 standard drinks of alcohol per week. She reports that she does not use drugs.    Medications:   Medications Prior to Admission   Medication Sig Dispense Refill Last Dose   • amLODIPine (NORVASC) 5 MG tablet TAKE 1 TABLET DAILY 90 tablet 3 3/15/2021 at Unknown time   • ascorbic acid (VITAMIN C) 100 MG tablet Take 100 mg by mouth Daily.   Past Week at Unknown time   • CALCIUM CITRATE PO Take 1 tablet by mouth Daily.   3/14/2021 at Unknown time   • cholecalciferol (VITAMIN D3) 1000 units tablet Take 1,000 Units by mouth Daily.   3/14/2021 at Unknown time   • esomeprazole (nexIUM) 40 MG capsule Take 1 capsule by mouth Daily. Must schedule appointment for more refills 90 capsule 3 3/14/2021 at Unknown time   • LORazepam (ATIVAN) 1 MG tablet Take 1 tablet by mouth Daily As Needed for Anxiety. 90 tablet 0 Past Week at Unknown time   • Multiple Vitamins-Minerals (WOMENS MULTI VITAMIN & MINERAL) tablet Take 1 tablet by mouth Daily.   3/14/2021 at Unknown time   • Probiotic Product (PROBIOTIC DAILY) capsule Take 1 capsule by mouth Daily.   3/14/2021 at Unknown time   • vitamin B-12 (CYANOCOBALAMIN) 500 MCG tablet Take 500 mcg by mouth Daily.   3/14/2021 at Unknown time   • fluticasone (FLONASE) 50 MCG/ACT nasal spray 2 sprays into each  "nostril Daily. 1 bottle 2 More than a month at Unknown time   • Wheat Dextrin (BENEFIBER PO) Take 1 tablet by mouth Daily.          Allergies:  Amitriptyline, Bupropion, and Pneumococcal vaccines    ROS:    Pertinent items are noted in HPI     Objective     Blood pressure 152/83, pulse 70, temperature 98.3 °F (36.8 °C), temperature source Oral, resp. rate 16, height 162.6 cm (64\"), weight 54.9 kg (121 lb), SpO2 97 %.    Physical Exam   Constitutional: Pt is oriented to person, place, and time and well-developed, well-nourished, and in no distress.   Mouth/Throat: Oropharynx is clear and moist.   Neck: Normal range of motion.   Cardiovascular: Normal rate, regular rhythm and normal heart sounds.    Pulmonary/Chest: Effort normal and breath sounds normal.   Abdominal: Soft. Nontender  Skin: Skin is warm and dry.   Psychiatric: Mood, memory, affect and judgment normal.     Assessment/Plan     Diagnosis:  Abreu's esophagus    Anticipated Surgical Procedure:  EGD    The risks, benefits, and alternatives of this procedure have been discussed with the patient or the responsible party- the patient understands and agrees to proceed.                                                          "

## 2021-03-15 NOTE — ANESTHESIA POSTPROCEDURE EVALUATION
"Patient: Brittny Fay    Procedure Summary     Date: 03/15/21 Room / Location:  [facility]    Anesthesia Start: 1028 Anesthesia Stop: 1047    Procedure: ESOPHAGOGASTRODUODENOSCOPY (N/A Esophagus) Diagnosis:       Intestinal metaplasia of gastric cardia      History of Abreu's esophagus      Gastroesophageal reflux disease, esophagitis presence not specified      Hiatal hernia      (Intestinal metaplasia of gastric cardia [K31.89])      (History of Abreu's esophagus [Z87.19])      (Gastroesophageal reflux disease, esophagitis presence not specified [K21.9])    Surgeons: Julio Young MD Provider: Lopez Michael MD    Anesthesia Type: MAC ASA Status: 3          Anesthesia Type: MAC    Vitals  No vitals data found for the desired time range.          Post Anesthesia Care and Evaluation    Patient participation: complete - patient participated  Level of consciousness: awake  Pain management: adequate  Airway patency: patent  Anesthetic complications: No anesthetic complications    Cardiovascular status: acceptable  Respiratory status: acceptable  Hydration status: acceptable    Comments: /83 (BP Location: Left arm, Patient Position: Lying)   Pulse 70   Temp 36.8 °C (98.3 °F) (Oral)   Resp 16   Ht 162.6 cm (64\")   Wt 54.9 kg (121 lb)   SpO2 97%   BMI 20.77 kg/m²         "

## 2021-03-15 NOTE — ANESTHESIA PREPROCEDURE EVALUATION
Anesthesia Evaluation     Patient summary reviewed and Nursing notes reviewed   NPO Solid Status: > 8 hours  NPO Liquid Status: > 8 hours           Airway   Mallampati: II  Neck ROM: full  Dental    (+) lower dentures and upper dentures    Pulmonary     breath sounds clear to auscultation  (+) a smoker Former, sleep apnea,   Cardiovascular     Rhythm: regular    (+) hypertension,       Neuro/Psych  (+) psychiatric history Anxiety,     GI/Hepatic/Renal/Endo    (+)  hiatal hernia, GERD,      Musculoskeletal     Abdominal    Substance History      OB/GYN          Other   arthritis,                        Anesthesia Plan    ASA 3     MAC       Anesthetic plan, all risks, benefits, and alternatives have been provided, discussed and informed consent has been obtained with: patient.

## 2021-03-16 ENCOUNTER — OFFICE VISIT (OUTPATIENT)
Dept: FAMILY MEDICINE CLINIC | Facility: CLINIC | Age: 86
End: 2021-03-16

## 2021-03-16 VITALS
HEIGHT: 64 IN | BODY MASS INDEX: 20.73 KG/M2 | HEART RATE: 88 BPM | DIASTOLIC BLOOD PRESSURE: 82 MMHG | RESPIRATION RATE: 16 BRPM | OXYGEN SATURATION: 95 % | TEMPERATURE: 96.5 F | WEIGHT: 121.4 LBS | SYSTOLIC BLOOD PRESSURE: 130 MMHG

## 2021-03-16 DIAGNOSIS — F41.1 GENERALIZED ANXIETY DISORDER: ICD-10-CM

## 2021-03-16 DIAGNOSIS — R73.01 ELEVATED FASTING GLUCOSE: ICD-10-CM

## 2021-03-16 DIAGNOSIS — G47.00 INSOMNIA, UNSPECIFIED TYPE: Primary | ICD-10-CM

## 2021-03-16 DIAGNOSIS — I10 BENIGN ESSENTIAL HYPERTENSION: ICD-10-CM

## 2021-03-16 DIAGNOSIS — N32.81 OVERACTIVE BLADDER: ICD-10-CM

## 2021-03-16 PROCEDURE — 99214 OFFICE O/P EST MOD 30 MIN: CPT | Performed by: INTERNAL MEDICINE

## 2021-03-16 RX ORDER — SOLIFENACIN SUCCINATE 5 MG/1
5 TABLET, FILM COATED ORAL DAILY
Qty: 30 TABLET | Refills: 3 | Status: SHIPPED | OUTPATIENT
Start: 2021-03-16 | End: 2021-11-12

## 2021-03-16 RX ORDER — LORAZEPAM 1 MG/1
1 TABLET ORAL DAILY PRN
Qty: 90 TABLET | Refills: 0 | Status: SHIPPED | OUTPATIENT
Start: 2021-03-16 | End: 2021-03-19 | Stop reason: SDUPTHER

## 2021-03-16 NOTE — PROGRESS NOTES
"Chief Complaint  Follow-up (htnn) and Blood Pressure Check    Subjective          Brittny Fay presents to [facility]  History of Present Illness  Here for f/u on HTN on norvasc 5 mg qd.  EGD yesterday with Dr. Young and she had Abreu's esophagus and her EGD yesterday looked negative.  He still wants her to remain nexium 40 mg qd.  Has OAB which causes her to urinate at night and she takes it for insomnia and anxiety at night.  Sometimes isn't able to go back to sleep.  Has tried otc and not helpful.  Takes ativan 1 mg 1/2 tab qhs and #90 has lasted her  Months but would like to take a whole tablet qhs.   Objective   Vital Signs:   /82   Pulse 88   Temp 96.5 °F (35.8 °C) (Temporal)   Resp 16   Ht 162.6 cm (64\")   Wt 55.1 kg (121 lb 6.4 oz)   SpO2 95%   BMI 20.84 kg/m²     Physical Exam  Vitals and nursing note reviewed.   Constitutional:       Appearance: Normal appearance. She is well-developed.   HENT:      Head: Normocephalic and atraumatic.      Right Ear: External ear normal.      Left Ear: External ear normal.   Eyes:      Extraocular Movements: Extraocular movements intact.      Conjunctiva/sclera: Conjunctivae normal.   Neck:      Vascular: No carotid bruit.   Cardiovascular:      Rate and Rhythm: Normal rate and regular rhythm.      Heart sounds: Normal heart sounds.      Comments: No bruits  Pulmonary:      Effort: Pulmonary effort is normal. No respiratory distress.      Breath sounds: Normal breath sounds. No wheezing or rales.   Abdominal:      General: Bowel sounds are normal. There is no distension.      Palpations: Abdomen is soft. There is no mass.      Tenderness: There is no abdominal tenderness.   Musculoskeletal:      Cervical back: Neck supple.   Lymphadenopathy:      Cervical: No cervical adenopathy.   Skin:     General: Skin is warm.   Neurological:      General: No focal deficit present.      Mental Status: She is alert and oriented to " person, place, and time.   Psychiatric:         Mood and Affect: Mood normal.         Behavior: Behavior normal.         Thought Content: Thought content normal.         Judgment: Judgment normal.        Result Review :                Hemoglobin A1c (03/09/2021 11:11)  T4, Free (03/09/2021 11:11)  TSH (03/09/2021 11:11)  Lipid Panel With LDL / HDL Ratio (03/09/2021 11:11)  Comprehensive Metabolic Panel (03/09/2021 11:11)  CBC & Differential (03/09/2021 11:11)    Assessment and Plan    Diagnoses and all orders for this visit:    1. Insomnia, unspecified type (Primary)    2. Generalized anxiety disorder  -     LORazepam (ATIVAN) 1 MG tablet; Take 1 tablet by mouth Daily As Needed for Anxiety.  Dispense: 90 tablet; Refill: 0    3. Overactive bladder    4. Elevated fasting glucose    5. Benign essential hypertension    Other orders  -     solifenacin (VESIcare) 5 MG tablet; Take 1 tablet by mouth Daily.  Dispense: 30 tablet; Refill: 3        Follow Up   No follow-ups on file.  Patient was given instructions and counseling regarding her condition or for health maintenance advice. Please see specific information pulled into the AVS if appropriate.     Insomnia and anxiety--refill ativan 1 mg and will continue 1/2 tab qhs prn and melaltonin if needed.  Trista alexander and ernie reviewed  OAB--start vesicare 5 mg qd which will hopefully help the nocturia which in turn will help her sleep better  HTN--continue norvasc 5 mg qd. Labs reviewed and look great  RTC 6 mo

## 2021-03-19 ENCOUNTER — TELEPHONE (OUTPATIENT)
Dept: FAMILY MEDICINE CLINIC | Facility: CLINIC | Age: 86
End: 2021-03-19

## 2021-03-19 DIAGNOSIS — F41.1 GENERALIZED ANXIETY DISORDER: ICD-10-CM

## 2021-03-19 RX ORDER — LORAZEPAM 1 MG/1
1 TABLET ORAL DAILY PRN
Qty: 90 TABLET | Refills: 0 | Status: CANCELLED | OUTPATIENT
Start: 2021-03-19

## 2021-03-19 RX ORDER — LORAZEPAM 1 MG/1
1 TABLET ORAL DAILY PRN
Qty: 90 TABLET | Refills: 0 | Status: SHIPPED | OUTPATIENT
Start: 2021-03-19 | End: 2021-09-01 | Stop reason: SDUPTHER

## 2021-03-19 NOTE — TELEPHONE ENCOUNTER
Caller: Brittny Fay    Relationship: Self    Best call back number: [phone]     Medication needed:   Requested Prescriptions     Pending Prescriptions Disp Refills   • LORazepam (ATIVAN) 1 MG tablet 90 tablet 0     Sig: Take 1 tablet by mouth Daily As Needed for Anxiety.       When do you need the refill by: ASAP    What additional details did the patient provide when requesting the medication: PT STATES SHE IS OUT, REQUEST 90 DAY PRESCRIPTION. STATES THAT IS WAS SENT BUT [facility] HAS NOT RECEIVED IT.    Does the patient have less than a 3 day supply:  [x] Yes  [] No    What is the patient's preferred pharmacy: [facility] - [place], [place] - [address] AT PORTAL TO REGISTERED [facility] - [phone]  - [phone] FX

## 2021-03-19 NOTE — TELEPHONE ENCOUNTER
PATIENT CALLED AGAIN STATING THAT SHE IS COMPLETELY OUT OF THIS MEDICATION. PATIENT IS CONCERNED BECAUSE SHE HAD APPT 3/16 AND WAS ADVISED THAT THIS WOULD BE CALLED IN.    PATIENT IS REQUESTING A CALL BACK ASAP FROM CLINICAL STAFF   [phone]

## 2021-04-13 ENCOUNTER — HOSPITAL ENCOUNTER (OUTPATIENT)
Dept: MAMMOGRAPHY | Facility: HOSPITAL | Age: 86
Discharge: HOME | End: 2021-04-13
Admitting: INTERNAL MEDICINE

## 2021-04-13 DIAGNOSIS — Z12.31 SCREENING MAMMOGRAM, ENCOUNTER FOR: ICD-10-CM

## 2021-04-13 PROCEDURE — 77067 SCR MAMMO BI INCL CAD: CPT

## 2021-04-13 PROCEDURE — 77063 BREAST TOMOSYNTHESIS BI: CPT

## 2021-04-22 ENCOUNTER — TELEPHONE (OUTPATIENT)
Dept: GASTROENTEROLOGY | Facility: CLINIC | Age: 86
End: 2021-04-22

## 2021-04-22 NOTE — TELEPHONE ENCOUNTER
Pt would like to speak to someone pt is having stomach issues pt had surgery a couple years ago and just wants to speak to someone. Pt says she is not in great pain but is just having some discomfort.

## 2021-04-22 NOTE — TELEPHONE ENCOUNTER
----- Message from Michael Reynolds sent at 4/22/2021  1:57 PM EDT -----  Regarding: Call back  Contact: [phone]  PT is returning phone call

## 2021-05-25 ENCOUNTER — OFFICE VISIT (OUTPATIENT)
Dept: GASTROENTEROLOGY | Facility: CLINIC | Age: 86
End: 2021-05-25

## 2021-05-25 VITALS — BODY MASS INDEX: 20.49 KG/M2 | HEIGHT: 64 IN | TEMPERATURE: 97.3 F | WEIGHT: 120 LBS

## 2021-05-25 DIAGNOSIS — R10.84 GENERALIZED ABDOMINAL PAIN: Primary | ICD-10-CM

## 2021-05-25 DIAGNOSIS — Z87.19 HISTORY OF BARRETT'S ESOPHAGUS: ICD-10-CM

## 2021-05-25 DIAGNOSIS — K21.9 GASTROESOPHAGEAL REFLUX DISEASE, UNSPECIFIED WHETHER ESOPHAGITIS PRESENT: ICD-10-CM

## 2021-05-25 DIAGNOSIS — Z87.19 HISTORY OF SMALL BOWEL OBSTRUCTION: ICD-10-CM

## 2021-05-25 PROCEDURE — 99214 OFFICE O/P EST MOD 30 MIN: CPT | Performed by: NURSE PRACTITIONER

## 2021-05-25 NOTE — PROGRESS NOTES
Chief Complaint   Patient presents with   • Abdominal Pain     HPI    Brtitny Fay is a  86 y.o. female here for a follow up visit for generalized abdominal pain.    This patient follows myself and Dr. Young.    Past GI medical history of diverticulitis, irritable bowel syndrome, Abreu's esophagus, and small bowel obstruction in 2019 requiring laparotomy.    Her current symptoms have been ongoing for several months worse over the last several weeks.  She reports pain in the upper abdomen and lower abdominal area.  Pain described as a bloating aching discomfort.  She is also struggling with small-volume piecemeal like stools.  Denies overt diarrhea.  No rectal bleeding.  She feels like acid reflux is well maintained on Nexium 40 mg once daily.  No nausea or vomiting.    Reviewed EGD dated 3/15/2021: Small hiatal hernia otherwise normal.    Normal CBC and CMP in March.    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   • Bladder prolapse, female, acquired    • Chest pain    • Chronic interstitial cystitis    • Colon polyps 09/23/2015   • Degeneration of cervical intervertebral disc    • Depression    • Diverticulitis 2008   • Diverticulosis of colon    • Gastritis    • GERD (gastroesophageal reflux disease)    • Hiatal hernia    • Hypertension    • IBS (irritable bowel syndrome)    • Insomnia    • Obstructive sleep apnea    • Osteoarthritis    • Reflux esophagitis    • Solitary thyroid nodule        Past Surgical History:   Procedure Laterality Date   • CATARACT EXTRACTION Bilateral    • COLON RESECTION N/A 03/04/2008    Sigmoid colon resection with low pelvic anastomosis, right [id number]. Stepehn Kelty, [facility]   • COLON RESECTION  2019    small bowel resection   • CYSTOCELE REPAIR N/A 06/2017   • ENDOSCOPY N/A 4/2/2018    Procedure: ESOPHAGOGASTRODUODENOSCOPY WITH COLD BIOPSIES;  Surgeon: Josiah Weber MD;  Location:   [facility];  Service: Gastroenterology   • ENDOSCOPY N/A 3/15/2021    Procedure: ESOPHAGOGASTRODUODENOSCOPY;  Surgeon: Julio Young MD;  Location:  [facility];  Service: Gastroenterology;  Laterality: N/A;  PRE- BARRETTS ESOPHAGUS  POST- HIATAL HERNIA   • ENDOSCOPY AND COLONOSCOPY N/A 04/29/2014    Gastritis: biopsied, esophagitis: biopsied, normal colonoscopy-Dr. Darren Lancaster [facility]   • EXPLORATORY LAPAROTOMY N/A 5/31/2019    Procedure: exploratory laparotomy;  Surgeon: Silvano Alarcon MD;  Location: [facility] MAIN OR;  Service: General   • LAPAROSCOPIC CHOLECYSTECTOMY N/A 05/25/2010    Dr. Darren Lancaster [facility]   • SMALL INTESTINE SURGERY N/A 2019    emergent due to SBO   • VAGINAL PROLAPSE REPAIR N/A     ALL PELVIC ORGAN PROLAPSE SURGERY   • VENTRAL HERNIA REPAIR N/A 11/03/2015    Repair of complex ventral incisional hernia with mesh-Dr. Darren Lancaster [facility]       Scheduled Meds:  Outpatient Encounter Medications as of 5/25/2021   Medication Sig Dispense Refill   • amLODIPine (NORVASC) 5 MG tablet TAKE 1 TABLET DAILY 90 tablet 3   • ascorbic acid (VITAMIN C) 100 MG tablet Take 100 mg by mouth Daily.     • CALCIUM CITRATE PO Take 1 tablet by mouth Daily.     • cholecalciferol (VITAMIN D3) 1000 units tablet Take 1,000 Units by mouth Daily.     • esomeprazole (nexIUM) 40 MG capsule Take 1 capsule by mouth Daily. Must schedule appointment for more refills 90 capsule 3   • fluticasone (FLONASE) 50 MCG/ACT nasal spray 2 sprays into each nostril Daily. 1 bottle 2   • LORazepam (ATIVAN) 1 MG tablet Take 1 tablet by mouth Daily As Needed for Anxiety. 90 tablet 0   • Multiple Vitamins-Minerals (WOMENS MULTI VITAMIN & MINERAL) tablet Take 1 tablet by mouth Daily.     • Probiotic Product (PROBIOTIC DAILY) capsule Take 1 capsule by mouth Daily.     • vitamin B-12 (CYANOCOBALAMIN) 500 MCG tablet Take 500 mcg by mouth Daily.     • Wheat Dextrin (BENEFIBER PO) Take 1 tablet by mouth Daily.     • solifenacin (VESIcare)  5 MG tablet Take 1 tablet by mouth Daily. 30 tablet 3     No facility-administered encounter medications on file as of 5/25/2021.       Continuous Infusions:No current facility-administered medications for this visit.      PRN Meds:.    Allergies   Allergen Reactions   • Amitriptyline Other (See Comments)     Other reaction(s): alertness/ hyperness   • Bupropion Other (See Comments)     Other reaction(s): insomnia   • Pneumococcal Vaccines Dizziness     Other reaction(s): Dizziness       Social History     Socioeconomic History   • Marital status:      Spouse name: Not on file   • Number of children: Not on file   • Years of education: Not on file   • Highest education level: Not on file   Tobacco Use   • Smoking status: Former Smoker     Packs/day: 1.50     Years: 20.00     Pack years: 30.00     Types: Cigarettes   • Smokeless tobacco: Former User   • Tobacco comment: quit 1970   Vaping Use   • Vaping Use: Never used   Substance and Sexual Activity   • Alcohol use: Yes     Alcohol/week: 5.0 standard drinks     Types: 5 Glasses of wine per week     Comment: socially    • Drug use: No   • Sexual activity: Defer     Partners: Male       Family History   Problem Relation Age of Onset   • Cancer Mother         Breast cancer   • Breast cancer Mother    • Hypertension Father    • Stroke Father    • Hypertension Brother    • Cancer Maternal Grandmother         breast   • Malig Hyperthermia Neg Hx        Review of Systems   Constitutional: Negative for activity change, appetite change, fatigue, fever and unexpected weight change.   HENT: Negative for trouble swallowing.    Respiratory: Negative for apnea, cough, choking, chest tightness, shortness of breath and wheezing.    Cardiovascular: Negative for chest pain, palpitations and leg swelling.   Gastrointestinal: Positive for abdominal pain. Negative for abdominal distention, anal bleeding, blood in stool, constipation, diarrhea, nausea, rectal pain and vomiting.        Vitals:    05/25/21 0905   Temp: 97.3 °F (36.3 °C)       Physical Exam  Constitutional:       Appearance: She is well-developed.   Abdominal:      General: Bowel sounds are normal. There is no distension.      Palpations: Abdomen is soft. There is no mass.      Tenderness: There is abdominal tenderness. There is no guarding.      Hernia: No hernia is present.          Comments: Patient reporting tenderness/discomfort in the areas marked above.   Musculoskeletal:         General: Normal range of motion.   Skin:     General: Skin is warm and dry.      Capillary Refill: Capillary refill takes less than 2 seconds.   Neurological:      Mental Status: She is alert and oriented to person, place, and time.   Psychiatric:         Behavior: Behavior normal.       Assessment    Generalized abdominal discomfort  GERD  History of Abreu's esophagus  History of small bowel obstruction    Plan    Arrange CT abdomen pelvis with IV and oral contrast for further evaluation of patient's abdominal pain complaints.  Start Metamucil 1 tablespoon daily.  Start Horvath colon health probiotics.  Continue daily PPI therapy to treat GERD.  Further recommendations and follow-up pending the aforementioned work-up.

## 2021-06-08 DIAGNOSIS — I10 BENIGN ESSENTIAL HYPERTENSION: ICD-10-CM

## 2021-06-08 RX ORDER — AMLODIPINE BESYLATE 10 MG/1
10 TABLET ORAL DAILY
Qty: 90 TABLET | Refills: 0 | Status: SHIPPED | OUTPATIENT
Start: 2021-06-08 | End: 2021-07-15 | Stop reason: SDUPTHER

## 2021-06-10 ENCOUNTER — APPOINTMENT (OUTPATIENT)
Dept: CT IMAGING | Facility: HOSPITAL | Age: 86
End: 2021-06-10

## 2021-06-15 ENCOUNTER — HOSPITAL ENCOUNTER (OUTPATIENT)
Dept: CT IMAGING | Facility: HOSPITAL | Age: 86
Discharge: HOME | End: 2021-06-15
Admitting: NURSE PRACTITIONER

## 2021-06-15 DIAGNOSIS — R10.84 GENERALIZED ABDOMINAL PAIN: ICD-10-CM

## 2021-06-15 LAB — CREAT BLDA-MCNC: 0.7 MG/DL (ref 0.6–1.3)

## 2021-06-15 PROCEDURE — 74177 CT ABD & PELVIS W/CONTRAST: CPT

## 2021-06-15 PROCEDURE — 82565 ASSAY OF CREATININE: CPT

## 2021-06-15 PROCEDURE — 25010000002 IOPAMIDOL 61 % SOLUTION: Performed by: NURSE PRACTITIONER

## 2021-06-15 PROCEDURE — 0 DIATRIZOATE MEGLUMINE & SODIUM PER 1 ML: Performed by: NURSE PRACTITIONER

## 2021-06-15 RX ADMIN — IOPAMIDOL 85 ML: 612 INJECTION, SOLUTION INTRAVENOUS at 09:52

## 2021-06-15 RX ADMIN — DIATRIZOATE MEGLUMINE AND DIATRIZOATE SODIUM 30 ML: 660; 100 LIQUID ORAL; RECTAL at 09:52

## 2021-06-17 NOTE — PROGRESS NOTES
Please inform Brittny that her CT scan does not show anything worrisome.  She has a moderate stool burden.  If she feeling better on Metamucil and probiotics?

## 2021-06-18 ENCOUNTER — TELEPHONE (OUTPATIENT)
Dept: GASTROENTEROLOGY | Facility: CLINIC | Age: 86
End: 2021-06-18

## 2021-06-18 NOTE — TELEPHONE ENCOUNTER
----- Message from SYDNI Mustafa sent at 6/17/2021 12:41 PM EDT -----  Please inform Brittny that her CT scan does not show anything worrisome.  She has a moderate stool burden.  If she feeling better on Metamucil and probiotics?

## 2021-06-18 NOTE — TELEPHONE ENCOUNTER
Patient called. No answer. Left VM(VM identified patient). Advised per Kisha's note. Advised to call back for questions/health update.

## 2021-06-25 DIAGNOSIS — I10 BENIGN ESSENTIAL HYPERTENSION: ICD-10-CM

## 2021-06-28 RX ORDER — AMLODIPINE BESYLATE 5 MG/1
TABLET ORAL
Qty: 90 TABLET | Refills: 3 | Status: SHIPPED | OUTPATIENT
Start: 2021-06-28 | End: 2021-09-01 | Stop reason: DRUGHIGH

## 2021-07-15 DIAGNOSIS — I10 BENIGN ESSENTIAL HYPERTENSION: ICD-10-CM

## 2021-07-15 NOTE — TELEPHONE ENCOUNTER
Caller: Nya Brittny JOVANI    Relationship: Self    Best call back number: [phone]    Medication needed:   Requested Prescriptions     Pending Prescriptions Disp Refills   • amLODIPine (NORVASC) 10 MG tablet 90 tablet 0     Sig: Take 1 tablet by mouth Daily.       When do you need the refill by: ASAP    What additional details did the patient provide when requesting the medication: PATIENT STATES THAT THERE IS SOME CONFUSION WITH HER MEDICATION AND SHE WOULD LIKE A CALL BACK    SHE STATES THAT THE 5MG TABS THAT WERE SENT TO [facility] ARE BEING RETURNED BECAUSE IT'S THE WRONG DOSAGE.     SHE STATES THAT THEY WILL ONLY FILL A 30 DAY SUPPLY AND SHE WOULD LIKE A 90 DAY SUPPLY    PLEASE ADVISE    Does the patient have less than a 3 day supply:  [x] Yes  [] No    What is the patient's preferred pharmacy: [facility] [address] [place] - [phone] [facility] [phone] FX

## 2021-07-16 RX ORDER — AMLODIPINE BESYLATE 10 MG/1
10 TABLET ORAL DAILY
Qty: 30 TABLET | Refills: 1 | Status: SHIPPED | OUTPATIENT
Start: 2021-07-16 | End: 2021-09-01 | Stop reason: SDUPTHER

## 2021-08-26 DIAGNOSIS — I10 BENIGN ESSENTIAL HYPERTENSION: Primary | ICD-10-CM

## 2021-08-26 DIAGNOSIS — R73.01 ELEVATED FASTING GLUCOSE: ICD-10-CM

## 2021-08-26 DIAGNOSIS — E78.89 ELEVATED HDL: ICD-10-CM

## 2021-08-26 DIAGNOSIS — G47.00 INSOMNIA, UNSPECIFIED TYPE: ICD-10-CM

## 2021-08-27 LAB
ALBUMIN SERPL-MCNC: 4.7 G/DL (ref 3.5–5.2)
ALBUMIN/GLOB SERPL: 1.9 G/DL
ALP SERPL-CCNC: 76 U/L (ref 39–117)
ALT SERPL-CCNC: 11 U/L (ref 1–33)
AST SERPL-CCNC: 16 U/L (ref 1–32)
BASOPHILS # BLD AUTO: 0.05 10*3/MM3 (ref 0–0.2)
BASOPHILS NFR BLD AUTO: 0.8 % (ref 0–1.5)
BILIRUB SERPL-MCNC: 0.6 MG/DL (ref 0–1.2)
BUN SERPL-MCNC: 18 MG/DL (ref 8–23)
BUN/CREAT SERPL: 23.1 (ref 7–25)
CALCIUM SERPL-MCNC: 9.6 MG/DL (ref 8.6–10.5)
CHLORIDE SERPL-SCNC: 102 MMOL/L (ref 98–107)
CHOLEST SERPL-MCNC: 188 MG/DL (ref 0–200)
CO2 SERPL-SCNC: 29.3 MMOL/L (ref 22–29)
CREAT SERPL-MCNC: 0.78 MG/DL (ref 0.57–1)
EOSINOPHIL # BLD AUTO: 0.19 10*3/MM3 (ref 0–0.4)
EOSINOPHIL NFR BLD AUTO: 2.9 % (ref 0.3–6.2)
ERYTHROCYTE [DISTWIDTH] IN BLOOD BY AUTOMATED COUNT: 12.9 % (ref 12.3–15.4)
GLOBULIN SER CALC-MCNC: 2.5 GM/DL
GLUCOSE SERPL-MCNC: 94 MG/DL (ref 65–99)
HCT VFR BLD AUTO: 45.2 % (ref 34–46.6)
HDLC SERPL-MCNC: 77 MG/DL (ref 40–60)
HGB BLD-MCNC: 13.9 G/DL (ref 12–15.9)
IMM GRANULOCYTES # BLD AUTO: 0.02 10*3/MM3 (ref 0–0.05)
IMM GRANULOCYTES NFR BLD AUTO: 0.3 % (ref 0–0.5)
LDLC SERPL CALC-MCNC: 94 MG/DL (ref 0–100)
LDLC/HDLC SERPL: 1.2 {RATIO}
LYMPHOCYTES # BLD AUTO: 2.08 10*3/MM3 (ref 0.7–3.1)
LYMPHOCYTES NFR BLD AUTO: 32 % (ref 19.6–45.3)
MCH RBC QN AUTO: 27.9 PG (ref 26.6–33)
MCHC RBC AUTO-ENTMCNC: 30.8 G/DL (ref 31.5–35.7)
MCV RBC AUTO: 90.6 FL (ref 79–97)
MONOCYTES # BLD AUTO: 0.63 10*3/MM3 (ref 0.1–0.9)
MONOCYTES NFR BLD AUTO: 9.7 % (ref 5–12)
NEUTROPHILS # BLD AUTO: 3.54 10*3/MM3 (ref 1.7–7)
NEUTROPHILS NFR BLD AUTO: 54.3 % (ref 42.7–76)
NRBC BLD AUTO-RTO: 0 /100 WBC (ref 0–0.2)
PLATELET # BLD AUTO: 265 10*3/MM3 (ref 140–450)
POTASSIUM SERPL-SCNC: 4.2 MMOL/L (ref 3.5–5.2)
PROT SERPL-MCNC: 7.2 G/DL (ref 6–8.5)
RBC # BLD AUTO: 4.99 10*6/MM3 (ref 3.77–5.28)
SODIUM SERPL-SCNC: 145 MMOL/L (ref 136–145)
TRIGL SERPL-MCNC: 93 MG/DL (ref 0–150)
TSH SERPL DL<=0.005 MIU/L-ACNC: 1.1 UIU/ML (ref 0.27–4.2)
VLDLC SERPL CALC-MCNC: 17 MG/DL (ref 5–40)
WBC # BLD AUTO: 6.51 10*3/MM3 (ref 3.4–10.8)

## 2021-09-01 ENCOUNTER — OFFICE VISIT (OUTPATIENT)
Dept: FAMILY MEDICINE CLINIC | Facility: CLINIC | Age: 86
End: 2021-09-01

## 2021-09-01 VITALS
WEIGHT: 123.6 LBS | BODY MASS INDEX: 21.1 KG/M2 | HEART RATE: 91 BPM | RESPIRATION RATE: 16 BRPM | OXYGEN SATURATION: 95 % | TEMPERATURE: 97.1 F | HEIGHT: 64 IN | DIASTOLIC BLOOD PRESSURE: 66 MMHG | SYSTOLIC BLOOD PRESSURE: 124 MMHG

## 2021-09-01 DIAGNOSIS — I10 BENIGN ESSENTIAL HYPERTENSION: ICD-10-CM

## 2021-09-01 DIAGNOSIS — R73.01 ELEVATED FASTING GLUCOSE: Primary | ICD-10-CM

## 2021-09-01 DIAGNOSIS — M79.605 LEFT LEG PAIN: ICD-10-CM

## 2021-09-01 DIAGNOSIS — F41.8 DEPRESSION WITH ANXIETY: ICD-10-CM

## 2021-09-01 DIAGNOSIS — G47.00 INSOMNIA, UNSPECIFIED TYPE: ICD-10-CM

## 2021-09-01 DIAGNOSIS — F41.1 GENERALIZED ANXIETY DISORDER: ICD-10-CM

## 2021-09-01 PROCEDURE — 99214 OFFICE O/P EST MOD 30 MIN: CPT | Performed by: INTERNAL MEDICINE

## 2021-09-01 RX ORDER — LORAZEPAM 1 MG/1
1 TABLET ORAL DAILY PRN
Qty: 90 TABLET | Refills: 0 | Status: SHIPPED | OUTPATIENT
Start: 2021-09-01 | End: 2022-03-08 | Stop reason: SDUPTHER

## 2021-09-01 RX ORDER — AMLODIPINE BESYLATE 10 MG/1
10 TABLET ORAL DAILY
Qty: 30 TABLET | Refills: 1 | Status: SHIPPED | OUTPATIENT
Start: 2021-09-01 | End: 2021-11-16 | Stop reason: SDUPTHER

## 2021-09-01 NOTE — PROGRESS NOTES
"Chief Complaint  Leg Pain ( pt states pain in left leg and only happens at night )    Subjective          Brittny Fay presents to [facility] PRIMARY CARE  History of Present Illness  Here for f/u on HTN, VDD and GERD.  Having pain in left leg at night but not during the day.  It is located in the back of the thigh and occasionally radiates to the calf.  Walks 2 miles /day and exercises 3 x/week and never has pain.  It prevents her from sleeping often times.  Topical biofreeze helps reduce the pain.  Went to  this week due to a bee sting and it got infected and they started her on mupirocin and doxy and it is improving.  HTN is well controlled on the 10 mg norvasc up from the 5 mg.  GERD on nexium.  Ativan prn qhs for insomnia and anxiety  Objective   Vital Signs:   /66   Pulse 91   Temp 97.1 °F (36.2 °C) (Temporal)   Resp 16   Ht 162.6 cm (64\")   Wt 56.1 kg (123 lb 9.6 oz)   SpO2 95%   BMI 21.22 kg/m²     Physical Exam  Vitals and nursing note reviewed.   Constitutional:       Appearance: Normal appearance. She is well-developed.   HENT:      Head: Normocephalic and atraumatic.      Right Ear: External ear normal.      Left Ear: External ear normal.   Eyes:      Conjunctiva/sclera: Conjunctivae normal.   Neck:      Vascular: No carotid bruit.   Cardiovascular:      Rate and Rhythm: Normal rate and regular rhythm.      Heart sounds: Normal heart sounds.      Comments: No bruits  Pulmonary:      Effort: Pulmonary effort is normal. No respiratory distress.      Breath sounds: Normal breath sounds. No wheezing or rales.   Abdominal:      General: Bowel sounds are normal. There is no distension.      Palpations: Abdomen is soft. There is no mass.      Tenderness: There is no abdominal tenderness.   Musculoskeletal:         General: No swelling, tenderness, deformity or signs of injury.      Cervical back: Neck supple.      Right lower leg: No edema.      Left lower leg: No edema.      " Comments: Neg SLR   Lymphadenopathy:      Cervical: No cervical adenopathy.   Skin:     General: Skin is warm.   Neurological:      General: No focal deficit present.      Mental Status: She is alert and oriented to person, place, and time.   Psychiatric:         Mood and Affect: Mood normal.         Behavior: Behavior normal.         Thought Content: Thought content normal.         Judgment: Judgment normal.        Result Review :                TSH Rfx On Abnormal To Free T4 (08/26/2021 09:38)  Lipid Panel With LDL / HDL Ratio (08/26/2021 09:38)  Comprehensive Metabolic Panel (08/26/2021 09:38)  CBC & Differential (08/26/2021 09:38)    Assessment and Plan    Diagnoses and all orders for this visit:    1. Elevated fasting glucose (Primary)    2. Benign essential hypertension  -     amLODIPine (NORVASC) 10 MG tablet; Take 1 tablet by mouth Daily.  Dispense: 30 tablet; Refill: 1    3. Depression with anxiety    4. Insomnia, unspecified type    5. Generalized anxiety disorder  -     LORazepam (ATIVAN) 1 MG tablet; Take 1 tablet by mouth Daily As Needed for Anxiety.  Dispense: 90 tablet; Refill: 0    6. Left leg pain        Follow Up   No follow-ups on file.  Patient was given instructions and counseling regarding her condition or for health maintenance advice. Please see specific information pulled into the AVS if appropriate.     1.  Left LE pain at night only--will try a topical cream to use on the leg prior to bed.  Could also try lidoderm patches.  She declines PT at this time.  Will consider later.  I think she is concerned about the pandemic  2.  Anxiety and insomnia--ativan refilled #90 that last her 6 mo  3.  HTN--refill amlodipine 10 mg qd  4.  GERD--nexium 40 mg qd  5. OAB--vesicare 5 mg qd  Labs reviewed with patient.   Look very good.

## 2021-09-08 ENCOUNTER — TELEPHONE (OUTPATIENT)
Dept: FAMILY MEDICINE CLINIC | Facility: CLINIC | Age: 86
End: 2021-09-08

## 2021-10-08 ENCOUNTER — APPOINTMENT (OUTPATIENT)
Dept: NUCLEAR MEDICINE | Facility: HOSPITAL | Age: 86
End: 2021-10-08

## 2021-10-08 ENCOUNTER — READMISSION MANAGEMENT (OUTPATIENT)
Dept: CALL CENTER | Facility: HOSPITAL | Age: 86
End: 2021-10-08

## 2021-10-08 ENCOUNTER — APPOINTMENT (OUTPATIENT)
Dept: GENERAL RADIOLOGY | Facility: HOSPITAL | Age: 86
End: 2021-10-08

## 2021-10-08 ENCOUNTER — HOSPITAL ENCOUNTER (OUTPATIENT)
Facility: HOSPITAL | Age: 86
Setting detail: OBSERVATION
Discharge: HOME | End: 2021-10-08
Attending: EMERGENCY MEDICINE | Admitting: EMERGENCY MEDICINE

## 2021-10-08 VITALS
OXYGEN SATURATION: 100 % | RESPIRATION RATE: 16 BRPM | SYSTOLIC BLOOD PRESSURE: 134 MMHG | HEART RATE: 75 BPM | DIASTOLIC BLOOD PRESSURE: 85 MMHG | HEIGHT: 64 IN | WEIGHT: 120 LBS | BODY MASS INDEX: 20.49 KG/M2 | TEMPERATURE: 98.4 F

## 2021-10-08 DIAGNOSIS — R07.89 ATYPICAL CHEST PAIN: Primary | ICD-10-CM

## 2021-10-08 LAB
ALBUMIN SERPL-MCNC: 4.9 G/DL (ref 3.5–5.2)
ALBUMIN/GLOB SERPL: 2.1 G/DL
ALP SERPL-CCNC: 71 U/L (ref 39–117)
ALT SERPL W P-5'-P-CCNC: 13 U/L (ref 1–33)
ANION GAP SERPL CALCULATED.3IONS-SCNC: 10.7 MMOL/L (ref 5–15)
AST SERPL-CCNC: 17 U/L (ref 1–32)
BASOPHILS # BLD AUTO: 0.03 10*3/MM3 (ref 0–0.2)
BASOPHILS NFR BLD AUTO: 0.5 % (ref 0–1.5)
BILIRUB SERPL-MCNC: 0.3 MG/DL (ref 0–1.2)
BUN SERPL-MCNC: 15 MG/DL (ref 8–23)
BUN/CREAT SERPL: 21.1 (ref 7–25)
CALCIUM SPEC-SCNC: 9.9 MG/DL (ref 8.6–10.5)
CHLORIDE SERPL-SCNC: 104 MMOL/L (ref 98–107)
CO2 SERPL-SCNC: 28.3 MMOL/L (ref 22–29)
CREAT SERPL-MCNC: 0.71 MG/DL (ref 0.57–1)
DEPRECATED RDW RBC AUTO: 38.9 FL (ref 37–54)
EOSINOPHIL # BLD AUTO: 0.18 10*3/MM3 (ref 0–0.4)
EOSINOPHIL NFR BLD AUTO: 3.1 % (ref 0.3–6.2)
ERYTHROCYTE [DISTWIDTH] IN BLOOD BY AUTOMATED COUNT: 12.3 % (ref 12.3–15.4)
GFR SERPL CREATININE-BSD FRML MDRD: 78 ML/MIN/1.73
GLOBULIN UR ELPH-MCNC: 2.3 GM/DL
GLUCOSE SERPL-MCNC: 108 MG/DL (ref 65–99)
HCT VFR BLD AUTO: 41.5 % (ref 34–46.6)
HGB BLD-MCNC: 13.5 G/DL (ref 12–15.9)
IMM GRANULOCYTES # BLD AUTO: 0.01 10*3/MM3 (ref 0–0.05)
IMM GRANULOCYTES NFR BLD AUTO: 0.2 % (ref 0–0.5)
LIPASE SERPL-CCNC: 21 U/L (ref 13–60)
LYMPHOCYTES # BLD AUTO: 1.81 10*3/MM3 (ref 0.7–3.1)
LYMPHOCYTES NFR BLD AUTO: 31.3 % (ref 19.6–45.3)
MCH RBC QN AUTO: 28.5 PG (ref 26.6–33)
MCHC RBC AUTO-ENTMCNC: 32.5 G/DL (ref 31.5–35.7)
MCV RBC AUTO: 87.7 FL (ref 79–97)
MONOCYTES # BLD AUTO: 0.57 10*3/MM3 (ref 0.1–0.9)
MONOCYTES NFR BLD AUTO: 9.8 % (ref 5–12)
NEUTROPHILS NFR BLD AUTO: 3.19 10*3/MM3 (ref 1.7–7)
NEUTROPHILS NFR BLD AUTO: 55.1 % (ref 42.7–76)
NRBC BLD AUTO-RTO: 0 /100 WBC (ref 0–0.2)
NT-PROBNP SERPL-MCNC: 76.7 PG/ML (ref 0–1800)
PLATELET # BLD AUTO: 227 10*3/MM3 (ref 140–450)
PMV BLD AUTO: 9.8 FL (ref 6–12)
POTASSIUM SERPL-SCNC: 4.1 MMOL/L (ref 3.5–5.2)
PROT SERPL-MCNC: 7.2 G/DL (ref 6–8.5)
QT INTERVAL: 390 MS
RBC # BLD AUTO: 4.73 10*6/MM3 (ref 3.77–5.28)
SARS-COV-2 RNA PNL SPEC NAA+PROBE: NOT DETECTED
SODIUM SERPL-SCNC: 143 MMOL/L (ref 136–145)
TROPONIN T SERPL-MCNC: <0.01 NG/ML (ref 0–0.03)
TROPONIN T SERPL-MCNC: <0.01 NG/ML (ref 0–0.03)
WBC # BLD AUTO: 5.79 10*3/MM3 (ref 3.4–10.8)

## 2021-10-08 PROCEDURE — 0 TECHNETIUM SESTAMIBI: Performed by: EMERGENCY MEDICINE

## 2021-10-08 PROCEDURE — G0378 HOSPITAL OBSERVATION PER HR: HCPCS

## 2021-10-08 PROCEDURE — 96374 THER/PROPH/DIAG INJ IV PUSH: CPT

## 2021-10-08 PROCEDURE — A9500 TC99M SESTAMIBI: HCPCS | Performed by: EMERGENCY MEDICINE

## 2021-10-08 PROCEDURE — C9803 HOPD COVID-19 SPEC COLLECT: HCPCS

## 2021-10-08 PROCEDURE — 78452 HT MUSCLE IMAGE SPECT MULT: CPT | Performed by: INTERNAL MEDICINE

## 2021-10-08 PROCEDURE — 84484 ASSAY OF TROPONIN QUANT: CPT | Performed by: EMERGENCY MEDICINE

## 2021-10-08 PROCEDURE — 93010 ELECTROCARDIOGRAM REPORT: CPT | Performed by: INTERNAL MEDICINE

## 2021-10-08 PROCEDURE — 93018 CV STRESS TEST I&R ONLY: CPT | Performed by: INTERNAL MEDICINE

## 2021-10-08 PROCEDURE — 87635 SARS-COV-2 COVID-19 AMP PRB: CPT | Performed by: EMERGENCY MEDICINE

## 2021-10-08 PROCEDURE — 93005 ELECTROCARDIOGRAM TRACING: CPT | Performed by: EMERGENCY MEDICINE

## 2021-10-08 PROCEDURE — 93005 ELECTROCARDIOGRAM TRACING: CPT

## 2021-10-08 PROCEDURE — 71045 X-RAY EXAM CHEST 1 VIEW: CPT

## 2021-10-08 PROCEDURE — 83880 ASSAY OF NATRIURETIC PEPTIDE: CPT | Performed by: EMERGENCY MEDICINE

## 2021-10-08 PROCEDURE — 25010000002 KETOROLAC TROMETHAMINE PER 15 MG: Performed by: EMERGENCY MEDICINE

## 2021-10-08 PROCEDURE — 85025 COMPLETE CBC W/AUTO DIFF WBC: CPT | Performed by: EMERGENCY MEDICINE

## 2021-10-08 PROCEDURE — 93017 CV STRESS TEST TRACING ONLY: CPT

## 2021-10-08 PROCEDURE — 99284 EMERGENCY DEPT VISIT MOD MDM: CPT

## 2021-10-08 PROCEDURE — 80053 COMPREHEN METABOLIC PANEL: CPT | Performed by: EMERGENCY MEDICINE

## 2021-10-08 PROCEDURE — 78452 HT MUSCLE IMAGE SPECT MULT: CPT

## 2021-10-08 PROCEDURE — 83690 ASSAY OF LIPASE: CPT | Performed by: EMERGENCY MEDICINE

## 2021-10-08 PROCEDURE — 99204 OFFICE O/P NEW MOD 45 MIN: CPT | Performed by: INTERNAL MEDICINE

## 2021-10-08 RX ORDER — CALCIUM CARBONATE 500(1250)
500 TABLET ORAL DAILY
Status: DISCONTINUED | OUTPATIENT
Start: 2021-10-08 | End: 2021-10-08 | Stop reason: HOSPADM

## 2021-10-08 RX ORDER — FLUTICASONE PROPIONATE 50 MCG
2 SPRAY, SUSPENSION (ML) NASAL DAILY
Status: DISCONTINUED | OUTPATIENT
Start: 2021-10-08 | End: 2021-10-08 | Stop reason: HOSPADM

## 2021-10-08 RX ORDER — CALCIUM POLYCARBOPHIL 625 MG
625 TABLET ORAL DAILY
Status: DISCONTINUED | OUTPATIENT
Start: 2021-10-08 | End: 2021-10-08 | Stop reason: HOSPADM

## 2021-10-08 RX ORDER — CHOLECALCIFEROL (VITAMIN D3) 125 MCG
500 CAPSULE ORAL DAILY
Status: DISCONTINUED | OUTPATIENT
Start: 2021-10-08 | End: 2021-10-08 | Stop reason: HOSPADM

## 2021-10-08 RX ORDER — MULTIPLE VITAMINS W/ MINERALS TAB 9MG-400MCG
1 TAB ORAL DAILY
Status: DISCONTINUED | OUTPATIENT
Start: 2021-10-08 | End: 2021-10-08 | Stop reason: HOSPADM

## 2021-10-08 RX ORDER — ASCORBIC ACID 500 MG
250 TABLET ORAL DAILY
Status: DISCONTINUED | OUTPATIENT
Start: 2021-10-08 | End: 2021-10-08 | Stop reason: HOSPADM

## 2021-10-08 RX ORDER — SODIUM CHLORIDE 0.9 % (FLUSH) 0.9 %
10 SYRINGE (ML) INJECTION AS NEEDED
Status: DISCONTINUED | OUTPATIENT
Start: 2021-10-08 | End: 2021-10-08 | Stop reason: HOSPADM

## 2021-10-08 RX ORDER — ASPIRIN 325 MG
325 TABLET ORAL ONCE
Status: COMPLETED | OUTPATIENT
Start: 2021-10-08 | End: 2021-10-08

## 2021-10-08 RX ORDER — AMLODIPINE BESYLATE 5 MG/1
10 TABLET ORAL DAILY
Status: DISCONTINUED | OUTPATIENT
Start: 2021-10-08 | End: 2021-10-08 | Stop reason: HOSPADM

## 2021-10-08 RX ORDER — PANTOPRAZOLE SODIUM 40 MG/1
40 TABLET, DELAYED RELEASE ORAL EVERY MORNING
Refills: 3 | Status: DISCONTINUED | OUTPATIENT
Start: 2021-10-09 | End: 2021-10-08 | Stop reason: HOSPADM

## 2021-10-08 RX ORDER — SODIUM CHLORIDE 0.9 % (FLUSH) 0.9 %
10 SYRINGE (ML) INJECTION EVERY 12 HOURS SCHEDULED
Status: DISCONTINUED | OUTPATIENT
Start: 2021-10-08 | End: 2021-10-08 | Stop reason: HOSPADM

## 2021-10-08 RX ORDER — LORAZEPAM 1 MG/1
1 TABLET ORAL DAILY PRN
Status: DISCONTINUED | OUTPATIENT
Start: 2021-10-08 | End: 2021-10-08 | Stop reason: HOSPADM

## 2021-10-08 RX ORDER — OXYBUTYNIN CHLORIDE 5 MG/1
5 TABLET, EXTENDED RELEASE ORAL DAILY
Refills: 3 | Status: DISCONTINUED | OUTPATIENT
Start: 2021-10-08 | End: 2021-10-08 | Stop reason: HOSPADM

## 2021-10-08 RX ORDER — MELATONIN
1000 DAILY
Status: DISCONTINUED | OUTPATIENT
Start: 2021-10-08 | End: 2021-10-08 | Stop reason: HOSPADM

## 2021-10-08 RX ORDER — KETOROLAC TROMETHAMINE 15 MG/ML
15 INJECTION, SOLUTION INTRAMUSCULAR; INTRAVENOUS ONCE
Status: COMPLETED | OUTPATIENT
Start: 2021-10-08 | End: 2021-10-08

## 2021-10-08 RX ORDER — L.ACID,PARA/B.BIFIDUM/S.THERM 8B CELL
1 CAPSULE ORAL DAILY
Status: DISCONTINUED | OUTPATIENT
Start: 2021-10-08 | End: 2021-10-08 | Stop reason: HOSPADM

## 2021-10-08 RX ADMIN — ASPIRIN 325 MG: 325 TABLET ORAL at 13:13

## 2021-10-08 RX ADMIN — KETOROLAC TROMETHAMINE 15 MG: 15 INJECTION, SOLUTION INTRAMUSCULAR; INTRAVENOUS at 14:27

## 2021-10-08 RX ADMIN — TECHNETIUM TC 99M SESTAMIBI 1 DOSE: 1 INJECTION INTRAVENOUS at 17:10

## 2021-10-08 RX ADMIN — TECHNETIUM TC 99M SESTAMIBI 1 DOSE: 1 INJECTION INTRAVENOUS at 16:15

## 2021-10-08 NOTE — CONSULTS
[facility]ART SPECIALISTS CONSULT        Subjective:     Encounter Date:10/08/2021      Patient ID: Brittny Fay is a 86 y.o. female.    Chief Complaint:      HPI: 86-year-old patient presents with left chest discomfort that she states is sharp in character and of short duration but recurrent.  She also reports prolonged left arm discomfort.  Left arm discomfort is been present for several days and has been constant she denies any associated shortness of breath diaphoresis or nausea.  She is resting comfortably supine free of orthopnea or PND and no lower extremity edema.  She denies syncope near syncope or any significant palpitations.  She reports that her symptoms are worse with movement.  EKG shows sinus rhythm no significant ST-T wave changes.  Initial troponin within normal limits    Cardiac risk factors positive for hypertension      The following portions of the patient's history were reviewed and updated as appropriate: allergies, current medications, past family history, past medical history, past social history, past surgical history and problem list.      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   • Bladder prolapse, female, acquired    • Chest pain    • Chronic interstitial cystitis    • Colon polyps 09/23/2015   • Degeneration of cervical intervertebral disc    • Depression    • Diverticulitis 2008   • Diverticulosis of colon    • Gastritis    • GERD (gastroesophageal reflux disease)    • Hiatal hernia    • Hypertension    • IBS (irritable bowel syndrome)    • Insomnia    • Obstructive sleep apnea    • Osteoarthritis    • Reflux esophagitis    • Solitary thyroid nodule          Past Surgical History:   Procedure Laterality Date   • CATARACT EXTRACTION Bilateral    • COLON RESECTION N/A 03/04/2008    Sigmoid colon resection with low pelvic anastomosis, right memzawxk-dubzlvyskoxv-Rl. Stepehn Kelty, [facility]   • COLON  RESECTION  2019    small bowel resection   • CYSTOCELE REPAIR N/A 06/2017   • ENDOSCOPY N/A 4/2/2018    Procedure: ESOPHAGOGASTRODUODENOSCOPY WITH COLD BIOPSIES;  Surgeon: Josiah Weber MD;  Location: [facility] ENDOSCOPY;  Service: Gastroenterology   • ENDOSCOPY N/A 3/15/2021    Procedure: ESOPHAGOGASTRODUODENOSCOPY;  Surgeon: Julio Young MD;  Location: [facility] ENDOSCOPY;  Service: Gastroenterology;  Laterality: N/A;  PRE- BARRETTS ESOPHAGUS  POST- HIATAL HERNIA   • ENDOSCOPY AND COLONOSCOPY N/A 04/29/2014    Gastritis: biopsied, esophagitis: biopsied, normal colonoscopy-Dr. Darren Lancaster, [facility]   • EXPLORATORY LAPAROTOMY N/A 5/31/2019    Procedure: exploratory laparotomy;  Surgeon: Silvano Alarcon MD;  Location: [facility] MAIN OR;  Service: General   • LAPAROSCOPIC CHOLECYSTECTOMY N/A 05/25/2010    Dr. Darren Lancaster, [facility]   • SMALL INTESTINE SURGERY N/A 2019    emergent due to SBO   • VAGINAL PROLAPSE REPAIR N/A     ALL PELVIC ORGAN PROLAPSE SURGERY   • VENTRAL HERNIA REPAIR N/A 11/03/2015    Repair of complex ventral incisional hernia with mesh-Dr. Darren Lancaster, [facility]     Family history: Negative for premature coronary heart disease    Social History     Socioeconomic History   • Marital status:      Spouse name: Not on file   • Number of children: Not on file   • Years of education: Not on file   • Highest education level: Not on file   Tobacco Use   • Smoking status: Former Smoker     Packs/day: 1.50     Years: 20.00     Pack years: 30.00     Types: Cigarettes   • Smokeless tobacco: Former User   • Tobacco comment: quit 1970   Vaping Use   • Vaping Use: Never used   Substance and Sexual Activity   • Alcohol use: Yes     Alcohol/week: 5.0 standard drinks     Types: 5 Glasses of wine per week     Comment: socially    • Drug use: No   • Sexual activity: Defer     Partners: Male         Allergies   Allergen Reactions   • Amitriptyline Other (See Comments)     Other reaction(s): alertness/ hyperness   •  "Bupropion Other (See Comments)     Other reaction(s): insomnia   • Pneumococcal Vaccines Dizziness     Other reaction(s): Dizziness       Current Medications:   Scheduled Meds:   Continuous Infusions:     Review of Systems   Constitutional: Negative for malaise/fatigue.   HENT: Negative for hearing loss and nosebleeds.    Eyes: Negative for double vision and visual disturbance.   Cardiovascular: Positive for chest pain. Negative for claudication, dyspnea on exertion, near-syncope, orthopnea, palpitations, paroxysmal nocturnal dyspnea and syncope.   Respiratory: Negative for cough, shortness of breath, sleep disturbances due to breathing, snoring, sputum production and wheezing.    Endocrine: Negative for cold intolerance, heat intolerance, polydipsia and polyuria.   Hematologic/Lymphatic: Negative for adenopathy and bleeding problem. Does not bruise/bleed easily.   Skin: Negative for flushing and itching.   Musculoskeletal: Negative for back pain, falls, joint pain, joint swelling, muscle weakness and neck pain.        Long duration left arm pain   Gastrointestinal: Negative for abdominal pain, dysphagia, heartburn, nausea and vomiting.   Genitourinary: Negative for dysuria and frequency.   Neurological: Negative for disturbances in coordination, dizziness, light-headedness, loss of balance, numbness and weakness.   Psychiatric/Behavioral: Negative for altered mental status and memory loss. The patient is not nervous/anxious.    Allergic/Immunologic: Negative for hives and persistent infections.          Objective:         /85   Pulse 75   Temp 98.4 °F (36.9 °C) (Tympanic)   Resp 16   Ht 162.6 cm (64\")   Wt 54.4 kg (120 lb)   SpO2 100%   BMI 20.60 kg/m²     Constitutional:       Appearance: Well-developed.   Eyes:      Conjunctiva/sclera: Conjunctivae normal.      Pupils: Pupils are equal, round, and reactive to light.   HENT:      Head: Normocephalic and atraumatic.   Neck:      Thyroid: No " thyromegaly.   Pulmonary:      Effort: Pulmonary effort is normal. No respiratory distress.      Breath sounds: Normal breath sounds. No wheezing. No rales.   Cardiovascular:      Normal rate. Regular rhythm.      S4 Gallop. No S3 gallop. No rub.   Edema:     Peripheral edema absent.   Abdominal:      General: Bowel sounds are normal. There is no distension.      Palpations: Abdomen is soft. There is no abdominal mass.      Tenderness: There is no abdominal tenderness.   Musculoskeletal: Normal range of motion.      Cervical back: Neck supple. Skin:     General: Skin is warm and dry.      Findings: No erythema.   Neurological:      Mental Status: Alert and oriented to person, place, and time.               ASCVD RIsk Score::  The ASCVD Risk score (Juanito DC Jr., et al., 2013) failed to calculate for the following reasons:    The 2013 ASCVD risk score is only valid for ages 40 to 79      Lab Review:   not applicable     Results from last 7 days   Lab Units 10/08/21  1308   SODIUM mmol/L 143   POTASSIUM mmol/L 4.1   CHLORIDE mmol/L 104   CO2 mmol/L 28.3   BUN mg/dL 15   CREATININE mg/dL 0.71   GLUCOSE mg/dL 108*   CALCIUM mg/dL 9.9   AST (SGOT) U/L 17   ALT (SGPT) U/L 13     Results from last 7 days   Lab Units 10/08/21  1308   TROPONIN T ng/mL <0.010     Results from last 7 days   Lab Units 10/08/21  1308   WBC 10*3/mm3 5.79   HEMOGLOBIN g/dL 13.5   HEMATOCRIT % 41.5   PLATELETS 10*3/mm3 227                   Invalid input(s): LDLCALC  Results from last 7 days   Lab Units 10/08/21  1308   PROBNP pg/mL 76.7           Recent Radiology:  Imaging Results (Most Recent)     Procedure Component Value Units Date/Time    XR Chest 1 View [[id number]] Collected: 10/08/21 1313     Updated: 10/08/21 1325    Narrative:      XR CHEST 1 VW-     HISTORY: Female who is 86 years-old,  chest pain     TECHNIQUE: Frontal views of the chest     COMPARISON: None available     FINDINGS: Heart size is borderline. Aorta is tortuous.  Pulmonary  vasculature is unremarkable. No focal pulmonary consolidation, pleural  effusion, or pneumothorax. No acute osseous process.       Impression:      No focal pulmonary consolidation. Borderline heart size.  Tortuous aorta. Follow-up as clinically indicated.     This report was finalized on 10/8/2021 1:22 PM by Dr. Lopez Tracey M.D.           EKG reviewed by me from 10/8/2021 reveals sinus rhythm at 77 bpm normal EKG      Assessment:         Active Hospital Problems    Diagnosis  POA   • Atypical chest pain [R07.89]  Yes          Plan:       Chest discomfort is atypical most consistent with musculoskeletal etiology.  We will repeat a troponin.  If her second troponin is within normal limits as well we will proceed to stress Cardiolite noninvasive ischemic assessment.  Thank you for asking us to participate in the care of your patient.             Nitin Mclaughlin MD  10/08/21  15:18 EDT

## 2021-10-08 NOTE — CASE MANAGEMENT/SOCIAL WORK
Discharge Planning Assessment  [facility]     Patient Name: Brittny Fay  MRN: [id number]  Today's Date: 10/8/2021    Admit Date: 10/8/2021    Discharge Needs Assessment     Row Name 10/08/21 1706       Living Environment    Lives With  alone    Current Living Arrangements  home/apartment/condo [facility]    Primary Care Provided by  self    Provides Primary Care For  no one    Family Caregiver if Needed  child(marcellus), adult    Family Caregiver Names  Diana Fajardo [phone]    Quality of Family Relationships  helpful;involved;supportive    Able to Return to Prior Arrangements  yes       Resource/Environmental Concerns    Resource/Environmental Concerns  none    Transportation Concerns  car, none       Transition Planning    Patient/Family Anticipates Transition to  home    Patient/Family Anticipated Services at Transition  none    Transportation Anticipated  car, drives self;family or friend will provide       Discharge Needs Assessment    Readmission Within the Last 30 Days  no previous admission in last 30 days    Current Outpatient/Agency/Support Group  other (see comments) [facility]    Equipment Currently Used at Home  bath bench;grab bar    Concerns to be Addressed  no discharge needs identified;denies needs/concerns at this time    Anticipated Changes Related to Illness  none    Equipment Needed After Discharge  none    Provided Post Acute Provider List?  N/A    Provided Post Acute Provider Quality & Resource List?  N/A        Discharge Plan     Row Name 10/08/21 1819       Plan    Final Discharge Disposition Code  01 - home or self-care    Final Note  D/C home via PV in stable condition    Row Name 10/08/21 1708       Plan    Plan  Face sheet verified, role of CCP verified. Pt is independent in ADL's. Denies the need for DME or any home health. States that family can assist if anything is needed. Denies any difficulty paying for medications    Provided Post Acute  Provider List?  Refused    Provided Post Acute Provider Quality & Resource List?  Refused    Plan Comments  Pt intends to return home upon discharge        Continued Care and Services - Admitted Since 10/8/2021    Coordination has not been started for this encounter.       Expected Discharge Date and Time     Expected Discharge Date Expected Discharge Time    Oct 8, 2021         Demographic Summary    No documentation.       Functional Status    No documentation.       Psychosocial    No documentation.       Abuse/Neglect    No documentation.       Legal    No documentation.       Substance Abuse    No documentation.       Patient Forms    No documentation.           Tia Bower RN

## 2021-10-08 NOTE — ED TRIAGE NOTES
Pt states that she has had left sided CP that started last night. Complains of left arm pain for the last few days. Denies N/V and SOA. Patient masked at arrival and triage staff wore all appropriate PPE during entire encounter with patient.

## 2021-10-08 NOTE — CASE MANAGEMENT/SOCIAL WORK
Discharge Planning Assessment  [facility]     Patient Name: Brittny Fay  MRN: [id number]  Today's Date: 10/8/2021    Admit Date: 10/8/2021    Discharge Needs Assessment     Row Name 10/08/21 1706       Living Environment    Lives With  alone    Current Living Arrangements  home/apartment/condo [facility]    Primary Care Provided by  self    Provides Primary Care For  no one    Family Caregiver if Needed  child(marcellus), adult    Family Caregiver Names  Diana Fajardo [phone]    Quality of Family Relationships  helpful;involved;supportive    Able to Return to Prior Arrangements  yes       Resource/Environmental Concerns    Resource/Environmental Concerns  none    Transportation Concerns  car, none       Transition Planning    Patient/Family Anticipates Transition to  home    Patient/Family Anticipated Services at Transition  none    Transportation Anticipated  car, drives self;family or friend will provide       Discharge Needs Assessment    Readmission Within the Last 30 Days  no previous admission in last 30 days    Current Outpatient/Agency/Support Group  other (see comments) [facility]    Equipment Currently Used at Home  bath bench;grab bar    Concerns to be Addressed  no discharge needs identified;denies needs/concerns at this time    Anticipated Changes Related to Illness  none    Equipment Needed After Discharge  none    Provided Post Acute Provider List?  N/A    Provided Post Acute Provider Quality & Resource List?  N/A        Discharge Plan     Row Name 10/08/21 1708       Plan    Plan  Face sheet verified, role of CCP verified. Pt is independent in ADL's. Denies the need for DME or any home health. States that family can assist if anything is needed. Denies any difficulty paying for medications    Provided Post Acute Provider List?  Refused    Provided Post Acute Provider Quality & Resource List?  Refused    Plan Comments  Pt intends to return home upon discharge         Continued Care and Services - Admitted Since 10/8/2021    Coordination has not been started for this encounter.         Demographic Summary    No documentation.       Functional Status    No documentation.       Psychosocial    No documentation.       Abuse/Neglect    No documentation.       Legal    No documentation.       Substance Abuse    No documentation.       Patient Forms    No documentation.           Tia Bower RN

## 2021-10-08 NOTE — H&P
[facility]   HISTORY AND PHYSICAL    Patient Name: Brittny Fay  : 1935  MRN: [id number]  Primary Care Physician:  Maria G Call MD  Date of admission: 10/8/2021    Subjective   Subjective     Chief Complaint: Chest Pain    HPI:    Brittny Fay is a very pleasant afebrile ambulatory 86 y.o. female who is being evaluated in the observation unit for chest pain.  She advises that she has been having some left arm discomfort for the last 5 days and around midnight she developed some sharp chest discomfort.  She denies any exertional or pleuritic component to discomfort.  She states she has never had this before.  She is not a smoker.  She did get vaccinated for Covid and received her booster a week ago.  Tells me she had a stress test but it has been more than 5 years she has never had a cardiac catheterization.  Tells me she had an echocardiogram gram about 5 years ago as well which was reassuring.  She cannot tell me why she had a cardiologist evaluation at that point.  Tells me that she doubts that she has any heart disease but is agreeable to testing while here in the observation unit.    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   • Bladder prolapse, female, acquired    • Chest pain    • Chronic interstitial cystitis    • Colon polyps 2015   • Degeneration of cervical intervertebral disc    • Depression    • Diverticulitis    • Diverticulosis of colon    • Gastritis    • GERD (gastroesophageal reflux disease)    • Hiatal hernia    • Hypertension    • IBS (irritable bowel syndrome)    • Insomnia    • Obstructive sleep apnea    • Osteoarthritis    • Reflux esophagitis    • Solitary thyroid nodule        Past Surgical History:   Procedure Laterality Date   • CATARACT EXTRACTION Bilateral    • COLON RESECTION N/A 2008    Sigmoid colon resection  with low pelvic anastomosis, right [id number]-Su. Stepehn Kelty, [facility]   • COLON RESECTION  2019    small bowel resection   • CYSTOCELE REPAIR N/A 06/2017   • ENDOSCOPY N/A 4/2/2018    Procedure: ESOPHAGOGASTRODUODENOSCOPY WITH COLD BIOPSIES;  Surgeon: Josiah Weber MD;  Location: [facility] ENDOSCOPY;  Service: Gastroenterology   • ENDOSCOPY N/A 3/15/2021    Procedure: ESOPHAGOGASTRODUODENOSCOPY;  Surgeon: Julio Young MD;  Location: [facility] ENDOSCOPY;  Service: Gastroenterology;  Laterality: N/A;  PRE- BARRETTS ESOPHAGUS  POST- HIATAL HERNIA   • ENDOSCOPY AND COLONOSCOPY N/A 04/29/2014    Gastritis: biopsied, esophagitis: biopsied, normal colonoscopy-Dr. Darren Lancaster, [facility]   • EXPLORATORY LAPAROTOMY N/A 5/31/2019    Procedure: exploratory laparotomy;  Surgeon: Silvano Alarcon MD;  Location: [facility] MAIN OR;  Service: General   • LAPAROSCOPIC CHOLECYSTECTOMY N/A 05/25/2010    Dr. Darren Lancaster, [facility]   • SMALL INTESTINE SURGERY N/A 2019    emergent due to SBO   • VAGINAL PROLAPSE REPAIR N/A     ALL PELVIC ORGAN PROLAPSE SURGERY   • VENTRAL HERNIA REPAIR N/A 11/03/2015    Repair of complex ventral incisional hernia with mesh-Dr. Darren Lancaster, [facility]       Family History: family history includes Breast cancer in her mother; Cancer in her maternal grandmother and mother; Hypertension in her brother and father; Stroke in her father. Otherwise pertinent FHx was reviewed and not pertinent to current issue.    Social History:  reports that she has quit smoking. Her smoking use included cigarettes. She has a 30.00 pack-year smoking history. She has quit using smokeless tobacco. She reports current alcohol use of about 5.0 standard drinks of alcohol per week. She reports that she does not use drugs.    Home Medications:  Calcium Citrate, LORazepam, Probiotic Daily, Wheat Dextrin, amLODIPine, ascorbic acid, cholecalciferol, doxycycline, esomeprazole, fluticasone, multivitamin with minerals, mupirocin,  solifenacin, and vitamin B-12    Allergies:  Allergies   Allergen Reactions   • Amitriptyline Other (See Comments)     Other reaction(s): alertness/ hyperness   • Bupropion Other (See Comments)     Other reaction(s): insomnia   • Pneumococcal Vaccines Dizziness     Other reaction(s): Dizziness       Objective   Objective     Vitals:   Temp:  [98.4 °F (36.9 °C)] 98.4 °F (36.9 °C)  Heart Rate:  [71-98] 75  Resp:  [16-20] 16  BP: (134-143)/(85-94) 134/85  Physical Exam    Constitutional: Awake, alert   Eyes: PERRLA, sclerae anicteric, no conjunctival injection   HENT: NCAT, mucous membranes moist   Neck: Supple, no thyromegaly, no lymphadenopathy, trachea midline   Respiratory: Clear to auscultation bilaterally, nonlabored respirations    Cardiovascular: RRR, no murmurs, rubs, or gallops, palpable pedal pulses bilaterally   Gastrointestinal: Positive bowel sounds, soft, nontender, nondistended   Musculoskeletal: No bilateral ankle edema, no clubbing or cyanosis to extremities   Psychiatric: Appropriate affect, cooperative   Neurologic: Oriented x 3, strength symmetric in all extremities, Cranial Nerves grossly intact to confrontation, speech clear   Skin: No rashes     Result Review    Result Review:  I have personally reviewed the results from the time of this admission to 10/8/2021 15:35 EDT and agree with these findings:  [x]  Laboratory  []  Microbiology  [x]  Radiology  [x]  EKG/Telemetry   [x]  Cardiology/Vascular   []  Pathology  []  Old records  []  Other:  Most notable findings include: negative troponin x 2, negative stress test    Assessment/Plan   Assessment / Plan       Active Hospital Problems:  Active Hospital Problems    Diagnosis    • Atypical chest pain      Plan:   Dr. Bar has advised patient's stress test today was normal and patient can be discharged home today and follow up in the office in 1 month.       DVT prophylaxis:  No DVT prophylaxis order currently exists.    CODE STATUS:        Admission Status:  I believe this patient meets observation status.    Electronically signed by SYDNI Krause, 10/08/21, 3:35 PM EDT.

## 2021-10-08 NOTE — PLAN OF CARE
Goal Outcome Evaluation:      Pt had stress test passed it with flying colors. Pt has been chest pain free. Pt being discharged with proper follow up

## 2021-10-08 NOTE — OUTREACH NOTE
Prep Survey      Responses   [facility] patient discharged from?  [place]   Is LACE score < 7 ?  Yes   Emergency Room discharge w/ pulse ox?  No   Eligibility  [facility]   Date of Admission  10/08/21   Discharge Disposition  Home or Self Care   Discharge diagnosis  atypical chest pain   Does the patient have one of the following disease processes/diagnoses(primary or secondary)?  Other   Does the patient have Home health ordered?  No   Is there a DME ordered?  No   Prep survey completed?  Yes          Bibiana Man RN

## 2021-10-09 LAB
BH CV NUCLEAR PRIOR STUDY: 3
BH CV REST NUCLEAR ISOTOPE DOSE: 11.8 MCI
BH CV STRESS NUCLEAR ISOTOPE DOSE: 35.8 MCI
BH CV STRESS RECOVERY BP: NORMAL MMHG
BH CV STRESS RECOVERY HR: 101 BPM
LV EF NUC BP: 73 %
MAXIMAL PREDICTED HEART RATE: 136 BPM
PERCENT MAX PREDICTED HR: 97.79 %
STRESS BASELINE BP: NORMAL MMHG
STRESS BASELINE HR: 94 BPM
STRESS PERCENT HR: 115 %
STRESS POST ESTIMATED WORKLOAD: 4.6 METS
STRESS POST EXERCISE DUR MIN: 3 MIN
STRESS POST EXERCISE DUR SEC: 0 SEC
STRESS POST PEAK BP: NORMAL MMHG
STRESS POST PEAK HR: 133 BPM
STRESS TARGET HR: 116 BPM

## 2021-10-11 ENCOUNTER — TRANSITIONAL CARE MANAGEMENT TELEPHONE ENCOUNTER (OUTPATIENT)
Dept: CALL CENTER | Facility: HOSPITAL | Age: 86
End: 2021-10-11

## 2021-10-11 NOTE — OUTREACH NOTE
Call Center TCM Note      Responses   [facility] patient discharged from? [place]   Does the patient have one of the following disease processes/diagnoses(primary or secondary)? Other   TCM attempt successful? Yes   Call start time 1200   Call end time 1206   Discharge diagnosis atypical chest pain   Meds reviewed with patient/caregiver? Yes   Does the patient have all medications ordered at discharge? N/A   Comments regarding appointments cards appt is on 11/12/21   Does the patient have a primary care provider?  Yes   Does the patient have an appointment with their PCP within 7 days of discharge? Greater than 7 days   Comments regarding PCP Hospital d/c f/u appt is on 10/19/21 at 12:45 pm   What is preventing the patient from scheduling follow up appointments within 7 days of discharge? --  [First appt patient was agreeable to]   Nursing Interventions Verified appointment date/time/provider   Has the patient kept scheduled appointments due by today? N/A   Psychosocial issues? No   Did the patient receive a copy of their discharge instructions? Yes   Nursing interventions Reviewed instructions with patient   What is the patient's perception of their health status since discharge? Improving   Is the patient/caregiver able to teach back signs and symptoms related to disease process for when to call PCP? Yes   Is the patient/caregiver able to teach back signs and symptoms related to disease process for when to call 911? Yes   Is the patient/caregiver able to teach back the hierarchy of who to call/visit for symptoms/problems? PCP, Specialist, Home health nurse, Urgent Care, ED, 911 Yes   If the patient is a current smoker, are they able to teach back resources for cessation? Not a smoker  [Former smoker]   TCM call completed? Yes          Betsy Acosta RN    10/11/2021, 12:06 EDT

## 2021-10-19 ENCOUNTER — OFFICE VISIT (OUTPATIENT)
Dept: FAMILY MEDICINE CLINIC | Facility: CLINIC | Age: 86
End: 2021-10-19

## 2021-10-19 VITALS
BODY MASS INDEX: 21.02 KG/M2 | WEIGHT: 123.1 LBS | HEART RATE: 85 BPM | HEIGHT: 64 IN | OXYGEN SATURATION: 96 % | DIASTOLIC BLOOD PRESSURE: 72 MMHG | TEMPERATURE: 96.8 F | SYSTOLIC BLOOD PRESSURE: 114 MMHG

## 2021-10-19 DIAGNOSIS — I10 BENIGN ESSENTIAL HYPERTENSION: Primary | ICD-10-CM

## 2021-10-19 DIAGNOSIS — Z09 HOSPITAL DISCHARGE FOLLOW-UP: ICD-10-CM

## 2021-10-19 PROBLEM — M79.671 PAIN IN RIGHT FOOT: Status: ACTIVE | Noted: 2021-10-19

## 2021-10-19 PROBLEM — M79.609 PAIN IN LIMB: Status: ACTIVE | Noted: 2021-10-19

## 2021-10-19 PROBLEM — A09 INFECTIOUS ENTERITIS: Status: ACTIVE | Noted: 2017-09-16

## 2021-10-19 PROBLEM — Z83.79 FAMILY HISTORY OF DIGESTIVE DISORDER: Status: RESOLVED | Noted: 2017-11-07 | Resolved: 2021-10-19

## 2021-10-19 PROCEDURE — 99214 OFFICE O/P EST MOD 30 MIN: CPT | Performed by: INTERNAL MEDICINE

## 2021-10-19 NOTE — PROGRESS NOTES
"Chief Complaint  Hospital Follow Up Visit (x 10/8/21)    Subjective          Brittny Fay presents to [facility]  History of Present Illness  Went to ER 2 weeks ago due to CP and left arm pain.  Was admitted and did a stress test the following morning and has a f/u with Dr. Bar in 2 weeks.  Neg stress test other than hyperdynamic LV at 70%.  She has not had any more CP.  Still having some left arm pain in the elbow and it is worse with movement and exercise.  Using weights makes the elbow more painful.  No soa and no kaplan.  Walking 2 miles /day and no cp or soa.    Appetite is normal.     Objective   Vital Signs:   /72   Pulse 85   Temp 96.8 °F (36 °C) (Temporal)   Ht 162.6 cm (64.02\")   Wt 55.8 kg (123 lb 1.6 oz)   SpO2 96%   BMI 21.12 kg/m²     Physical Exam  Vitals and nursing note reviewed.   Constitutional:       Appearance: Normal appearance. She is well-developed.   HENT:      Head: Normocephalic and atraumatic.      Right Ear: External ear normal.      Left Ear: External ear normal.   Eyes:      Extraocular Movements: Extraocular movements intact.      Conjunctiva/sclera: Conjunctivae normal.   Neck:      Vascular: No carotid bruit.   Cardiovascular:      Rate and Rhythm: Normal rate and regular rhythm.      Heart sounds: Normal heart sounds.      Comments: No bruits  Pulmonary:      Effort: Pulmonary effort is normal. No respiratory distress.      Breath sounds: Normal breath sounds. No wheezing or rales.   Abdominal:      General: Bowel sounds are normal. There is no distension.      Palpations: Abdomen is soft. There is no mass.      Tenderness: There is no abdominal tenderness.   Musculoskeletal:      Cervical back: Neck supple.   Lymphadenopathy:      Cervical: No cervical adenopathy.   Skin:     General: Skin is warm.   Neurological:      General: No focal deficit present.      Mental Status: She is alert and oriented to person, place, and time. "   Psychiatric:         Mood and Affect: Mood normal.         Behavior: Behavior normal.         Thought Content: Thought content normal.         Judgment: Judgment normal.      CBC & Differential (10/08/2021 13:08)  Comprehensive Metabolic Panel (10/08/2021 13:08)  Lipase (10/08/2021 13:08)  BNP (10/08/2021 13:08)  Troponin (10/08/2021 13:08)  COVID PRE-OP / PRE-PROCEDURE SCREENING ORDER (NO ISOLATION) - Swab, Nasopharynx (10/08/2021 13:08)  Troponin (10/08/2021 15:00)    Result Review :                Stress Test With Myocardial Perfusion One Day (10/08/2021 17:39)  XR Chest 1 View (10/08/2021 13:10)    Assessment and Plan    Diagnoses and all orders for this visit:    1. Benign essential hypertension (Primary)    2. Hospital discharge follow-up        Follow Up   No follow-ups on file.  Patient was given instructions and counseling regarding her condition or for health maintenance advice. Please see specific information pulled into the AVS if appropriate.     Reviewed labs, CXR and stress test from hospitalization.  Tortuous aorta, borderline heart size and labs reviewed.    She will f/u with cardiology dr. Bar in 2 weeks  She is avoiding lifting and her exercises that flared her left elbow for now.  Will monitor.  She had a previous shoulder issue that caused similar pain in the left elbow a few years ago that resolved with steroid injection.

## 2021-10-25 RX ORDER — ESOMEPRAZOLE MAGNESIUM 40 MG/1
CAPSULE, DELAYED RELEASE ORAL
Qty: 90 CAPSULE | Refills: 3 | Status: SHIPPED | OUTPATIENT
Start: 2021-10-25 | End: 2022-10-12

## 2021-11-11 NOTE — PROGRESS NOTES
"Chief Complaint  Hospital Follow Up Visit    Subjective    History of Present Illness      I saw Brittny Fay today for continued cardiovascular care. She is a very sweet 86-year-old female who was hospitalized at [facility] with chief complaint of chest discomfort 10 821. She underwent nuclear stress testing on that same date. This demonstrated preserved left ventricular function no evidence of ischemia. Brittny is in good spirits remains active and denies any chest pain pressure or tightness. She is free of any significant dyspnea on exertion. She does not experience orthopnea or PND no lower extremity edema. She is free of syncope near syncope or palpitations. Her blood pressure remains well controlled.    Objective   Vital Signs:   /78   Pulse 79   Ht 162.6 cm (64\")   Wt 56.2 kg (124 lb)   BMI 21.28 kg/m²     Constitutional:       Appearance: Well-developed.   Eyes:      Conjunctiva/sclera: Conjunctivae normal.      Pupils: Pupils are equal, round, and reactive to light.   HENT:      Head: Normocephalic and atraumatic.   Neck:      Thyroid: No thyromegaly.   Pulmonary:      Effort: Pulmonary effort is normal. No respiratory distress.      Breath sounds: Normal breath sounds. No wheezing. No rales.   Cardiovascular:      Normal rate. Regular rhythm.      No gallop. No S3 and S4 gallop. No rub.   Edema:     Peripheral edema absent.   Abdominal:      General: Bowel sounds are normal. There is no distension.      Palpations: Abdomen is soft. There is no abdominal mass.      Tenderness: There is no abdominal tenderness.   Musculoskeletal: Normal range of motion.      Cervical back: Neck supple. Skin:     General: Skin is warm and dry.      Findings: No erythema.   Neurological:      Mental Status: Alert and oriented to person, place, and time.         Result Review :     Common labs    Common Labsle 6/15/21 8/26/21 8/26/21 8/26/21 10/8/21 10/8/21     0938 0938 0938 1308 1308   Glucose   94   108 (A) "   BUN   18   15   Creatinine 0.70  0.78   0.71   eGFR Non  Am   70   78   eGFR African Am   85      Sodium   145   143   Potassium   4.2   4.1   Chloride   102   104   Calcium   9.6   9.9   Total Protein   7.2      Albumin   4.70   4.90   Total Bilirubin   0.6   0.3   Alkaline Phosphatase   76   71   AST (SGOT)   16   17   ALT (SGPT)   11   13   WBC    6.51 5.79    Hemoglobin    13.9 13.5    Hematocrit    45.2 41.5    Platelets    265 227    Total Cholesterol  188       Triglycerides  93       HDL Cholesterol  77 (A)       LDL Cholesterol   94       (A) Abnormal value       Comments are available for some flowsheets but are not being displayed.                     Assessment and Plan    1. Benign essential hypertension  Controlled    2. Precordial pain  Atypical and resolved    Brittny is active and feels well. She is free of any further chest pain pressure or tightness. She is euvolemic on physical examination. She does not report any significant dyspnea on exertion. I have encouraged her to continue her current medical regimen, restrict the salt in her diet and observe a low-cholesterol low saturated fat diet I will see her in follow-up in 6 months.        Follow Up   No follow-ups on file.  Patient was given instructions and counseling regarding her condition or for health maintenance advice. Please see specific information pulled into the AVS if appropriate.

## 2021-11-12 ENCOUNTER — OFFICE VISIT (OUTPATIENT)
Dept: CARDIOLOGY | Facility: CLINIC | Age: 86
End: 2021-11-12

## 2021-11-12 VITALS
SYSTOLIC BLOOD PRESSURE: 120 MMHG | WEIGHT: 124 LBS | HEART RATE: 79 BPM | HEIGHT: 64 IN | DIASTOLIC BLOOD PRESSURE: 78 MMHG | BODY MASS INDEX: 21.17 KG/M2

## 2021-11-12 DIAGNOSIS — I10 BENIGN ESSENTIAL HYPERTENSION: Primary | ICD-10-CM

## 2021-11-12 DIAGNOSIS — R07.2 PRECORDIAL PAIN: ICD-10-CM

## 2021-11-12 PROCEDURE — 99214 OFFICE O/P EST MOD 30 MIN: CPT | Performed by: INTERNAL MEDICINE

## 2021-11-16 DIAGNOSIS — I10 BENIGN ESSENTIAL HYPERTENSION: ICD-10-CM

## 2021-11-16 NOTE — TELEPHONE ENCOUNTER
Rx Refill Note  Requested Prescriptions     Pending Prescriptions Disp Refills   • amLODIPine (NORVASC) 10 MG tablet 90 tablet 1     Sig: Take 1 tablet by mouth Daily.      Last office visit with prescribing clinician: 10/19/2021      Next office visit with prescribing clinician: 3/16/2022            Duyen Vila MA  11/16/21, 18:29 EST

## 2021-11-17 RX ORDER — AMLODIPINE BESYLATE 10 MG/1
10 TABLET ORAL DAILY
Qty: 90 TABLET | Refills: 1 | Status: SHIPPED | OUTPATIENT
Start: 2021-11-17 | End: 2022-03-08 | Stop reason: SDUPTHER

## 2022-02-15 ENCOUNTER — TELEPHONE (OUTPATIENT)
Dept: FAMILY MEDICINE CLINIC | Facility: CLINIC | Age: 87
End: 2022-02-15

## 2022-02-15 NOTE — TELEPHONE ENCOUNTER
Pt is aware and will call back once she thinks about it. She states she is not as bad off as some people.

## 2022-02-15 NOTE — TELEPHONE ENCOUNTER
Caller: Brittny Fay    Relationship: Self    Best call back number: [phone]    Who are you requesting to speak with (clinical staff, provider,  specific staff member): ESTEBAN ANAYA    What was the call regarding: CALLING TO LET DR. ORELLANA KNOW SHE IS POSITIVE FOR COVID

## 2022-02-15 NOTE — TELEPHONE ENCOUNTER
I would encourage her to do MAB--it prevents hospitalization and worsening of symptoms.  We have to give it within 7 days of sx onset WILMAR

## 2022-02-15 NOTE — TELEPHONE ENCOUNTER
Pt states symptoms started 2/14/22 and tested postive on 2/14/22. Pt was given advise of MAB she states she is going to think about it at this time, because she does not feel well enough to go out. She is fully vaccinated with a booster.

## 2022-02-24 DIAGNOSIS — E78.89 ELEVATED HDL: ICD-10-CM

## 2022-02-24 DIAGNOSIS — I10 BENIGN ESSENTIAL HYPERTENSION: ICD-10-CM

## 2022-02-24 DIAGNOSIS — R73.01 ELEVATED FASTING GLUCOSE: Primary | ICD-10-CM

## 2022-03-08 ENCOUNTER — OFFICE VISIT (OUTPATIENT)
Dept: FAMILY MEDICINE CLINIC | Facility: CLINIC | Age: 87
End: 2022-03-08

## 2022-03-08 VITALS
HEART RATE: 64 BPM | DIASTOLIC BLOOD PRESSURE: 86 MMHG | OXYGEN SATURATION: 98 % | BODY MASS INDEX: 21.58 KG/M2 | WEIGHT: 126.4 LBS | RESPIRATION RATE: 16 BRPM | TEMPERATURE: 96.4 F | HEIGHT: 64 IN | SYSTOLIC BLOOD PRESSURE: 124 MMHG

## 2022-03-08 DIAGNOSIS — R73.01 ELEVATED FASTING GLUCOSE: ICD-10-CM

## 2022-03-08 DIAGNOSIS — F41.1 GENERALIZED ANXIETY DISORDER: ICD-10-CM

## 2022-03-08 DIAGNOSIS — F41.8 DEPRESSION WITH ANXIETY: ICD-10-CM

## 2022-03-08 DIAGNOSIS — I10 BENIGN ESSENTIAL HYPERTENSION: ICD-10-CM

## 2022-03-08 DIAGNOSIS — G47.00 INSOMNIA, UNSPECIFIED TYPE: ICD-10-CM

## 2022-03-08 DIAGNOSIS — Z12.31 SCREENING MAMMOGRAM, ENCOUNTER FOR: Primary | ICD-10-CM

## 2022-03-08 PROCEDURE — 1170F FXNL STATUS ASSESSED: CPT | Performed by: INTERNAL MEDICINE

## 2022-03-08 PROCEDURE — 99213 OFFICE O/P EST LOW 20 MIN: CPT | Performed by: INTERNAL MEDICINE

## 2022-03-08 PROCEDURE — G0439 PPPS, SUBSEQ VISIT: HCPCS | Performed by: INTERNAL MEDICINE

## 2022-03-08 PROCEDURE — 1160F RVW MEDS BY RX/DR IN RCRD: CPT | Performed by: INTERNAL MEDICINE

## 2022-03-08 RX ORDER — AMLODIPINE BESYLATE 10 MG/1
10 TABLET ORAL DAILY
Qty: 90 TABLET | Refills: 1 | Status: SHIPPED | OUTPATIENT
Start: 2022-03-08 | End: 2022-10-12

## 2022-03-08 RX ORDER — LORAZEPAM 1 MG/1
1 TABLET ORAL DAILY PRN
Qty: 90 TABLET | Refills: 0 | Status: SHIPPED | OUTPATIENT
Start: 2022-03-08 | End: 2022-09-07 | Stop reason: SDUPTHER

## 2022-03-08 NOTE — PROGRESS NOTES
The ABCs of the Annual Wellness Visit  Subsequent Medicare Wellness Visit    No chief complaint on file.     Subjective    History of Present Illness:  Brittny Fay is a 87 y.o. female who presents for a Subsequent Medicare Wellness Visit.    The following portions of the patient's history were reviewed and   updated as appropriate: allergies, current medications, past family history, past medical history, past social history, past surgical history and problem list.    Compared to one year ago, the patient feels her physical   health is the same.    Compared to one year ago, the patient feels her mental   health is the same.    Recent Hospitalizations:  This patient has had a [facility] admission record on file within the last 365 days.    Current Medical Providers:  Patient Care Team:  Maria G Call MD as PCP - General (Internal Medicine)  Nadya Vigil MD (Inactive) as Consulting Physician (Obstetrics and Gynecology)  Darren Lancaster MD as Consulting Physician (General Surgery)    Outpatient Medications Prior to Visit   Medication Sig Dispense Refill   • amLODIPine (NORVASC) 10 MG tablet Take 1 tablet by mouth Daily. 90 tablet 1   • ascorbic acid (VITAMIN C) 100 MG tablet Take 100 mg by mouth Daily.     • CALCIUM CITRATE PO Take 1 tablet by mouth Daily.     • cholecalciferol (VITAMIN D3) 1000 units tablet Take 1,000 Units by mouth Daily.     • esomeprazole (nexIUM) 40 MG capsule TAKE 1 CAPSULE DAILY 90 capsule 3   • fluticasone (FLONASE) 50 MCG/ACT nasal spray 2 sprays into each nostril Daily. 1 bottle 2   • LORazepam (ATIVAN) 1 MG tablet Take 1 tablet by mouth Daily As Needed for Anxiety. 90 tablet 0   • Multiple Vitamins-Minerals (WOMENS MULTI VITAMIN & MINERAL) tablet Take 1 tablet by mouth Daily.     • mupirocin (BACTROBAN) 2 % ointment Apply  topically to the appropriate area as directed 3 (Three) Times a Day. 30 g 0   • Probiotic Product (PROBIOTIC DAILY) capsule Take 1 capsule by mouth Daily.      • vitamin B-12 (CYANOCOBALAMIN) 500 MCG tablet Take 500 mcg by mouth Daily.     • Wheat Dextrin (BENEFIBER PO) Take 1 tablet by mouth Daily.       No facility-administered medications prior to visit.       No opioid medication identified on active medication list. I have reviewed chart for other potential  high risk medication/s and harmful drug interactions in the elderly.          Aspirin is not on active medication list.  Aspirin use is not indicated based on review of current medical condition/s. Risk of harm outweighs potential benefits.  .    Patient Active Problem List   Diagnosis   • Depression with anxiety   • Benign essential hypertension   • Overactive bladder   • Degeneration of intervertebral disc of cervical region   • Irritable bowel syndrome with diarrhea   • Gastroesophageal reflux disease   • Seasonal allergic rhinitis   • Elevated fasting glucose   • Osteopenia   • Non-toxic multinodular goiter   • Insomnia   • Hyperplastic adenomatous polyp of stomach   • Hiatal hernia   • Onychocryptosis   • Acute UTI   • Bladder prolapse, female, acquired   • Renal cyst, left   • History of resection of large bowel   • Pelvic floor dysfunction   • Intestinal metaplasia of gastric cardia   • Abreu's esophagus with dysplasia   • SBO (small bowel obstruction) (HCC)   • History of Abreu's esophagus   • Atypical chest pain   • Absence of bladder continence   • Encounter for fitting and adjustment of other specified devices   • Female stress incontinence   • Generalized abdominal pain   • HTN (hypertension)   • Incisional hernia   • Infectious enteritis   • Pain in limb   • Pain in right foot   • Urge incontinence   • Uterovaginal prolapse, incomplete   • Vaginitis and vulvovaginitis     Advance Care Planning  Advance Directive is not on file.  ACP discussion was held with the patient during this visit. Patient has an advance directive (not in EMR), copy requested.          Objective    Vitals:    03/08/22 0940  "  BP: 124/86   Pulse: 64   Resp: 16   Temp: 96.4 °F (35.8 °C)   TempSrc: Temporal   SpO2: 98%   Weight: 57.3 kg (126 lb 6.4 oz)   Height: 162.6 cm (64\")   PainSc: 0-No pain     BMI Readings from Last 1 Encounters:   03/08/22 21.70 kg/m²   BMI is within normal parameters. No follow-up required.    Does the patient have evidence of cognitive impairment? No    Physical Exam  Lab Results   Component Value Date    CHLPL 181 02/28/2022    TRIG 94 02/28/2022    HDL 73 02/28/2022    LDL 91 02/28/2022    VLDL 17 02/28/2022    HGBA1C 5.7 (H) 02/28/2022            HEALTH RISK ASSESSMENT    Smoking Status:  Social History     Tobacco Use   Smoking Status Former Smoker   • Packs/day: 1.50   • Years: 20.00   • Pack years: 30.00   • Types: Cigarettes   Smokeless Tobacco Never Used   Tobacco Comment    quit 1970     Alcohol Consumption:  Social History     Substance and Sexual Activity   Alcohol Use Yes   • Alcohol/week: 5.0 standard drinks   • Types: 5 Glasses of wine per week    Comment: socially      Fall Risk Screen:    KENNY Fall Risk Assessment was completed, and patient is at LOW risk for falls.Assessment completed on:3/8/2022    Depression Screening:  PHQ-2/PHQ-9 Depression Screening 3/8/2022   Little Interest or Pleasure in Doing Things 0-->not at all   Feeling Down, Depressed or Hopeless 0-->not at all   PHQ-9: Brief Depression Severity Measure Score 0       Health Habits and Functional and Cognitive Screening:  Functional & Cognitive Status 3/8/2022   Do you have difficulty preparing food and eating? No   Do you have difficulty bathing yourself, getting dressed or grooming yourself? No   Do you have difficulty using the toilet? No   Do you have difficulty moving around from place to place? No   Do you have trouble with steps or getting out of a bed or a chair? No   Current Diet Well Balanced Diet   Dental Exam Up to date   Eye Exam Up to date   Exercise (times per week) 3 times per week   Current Exercises Include " Walking;Aerobics   Current Exercise Activities Include -   Do you need help using the phone?  No   Are you deaf or do you have serious difficulty hearing?  No   Do you need help with transportation? No   Do you need help shopping? No   Do you need help preparing meals?  No   Do you need help with housework?  No   Do you need help with laundry? No   Do you need help taking your medications? No   Do you need help managing money? No   Do you ever drive or ride in a car without wearing a seat belt? No   Have you felt unusual stress, anger or loneliness in the last month? No   Who do you live with? Community   If you need help, do you have trouble finding someone available to you? No   Have you been bothered in the last four weeks by sexual problems? No   Do you have difficulty concentrating, remembering or making decisions? No       Age-appropriate Screening Schedule:  Refer to the list below for future screening recommendations based on patient's age, sex and/or medical conditions. Orders for these recommended tests are listed in the plan section. The patient has been provided with a written plan.    Health Maintenance   Topic Date Due   • DXA SCAN  03/08/2021   • INFLUENZA VACCINE  08/01/2021   • ZOSTER VACCINE (2 of 3) 03/16/2022 (Originally 5/16/2012)   • TDAP/TD VACCINES (4 - Td or Tdap) 06/18/2022   • MAMMOGRAM  04/13/2023              Assessment/Plan   CMS Preventative Services Quick Reference  Risk Factors Identified During Encounter  Alcohol Misuse  The above risks/problems have been discussed with the patient.  Follow up actions/plans if indicated are seen below in the Assessment/Plan Section.  Pertinent information has been shared with the patient in the After Visit Summary.    There are no diagnoses linked to this encounter.    Follow Up:   No follow-ups on file.     An After Visit Summary and PPPS were made available to the patient.                 Order MMG  CRC is up to date  Counseled on alcohol  reduction and misuse--she is working to decrease the # of drinks per day.

## 2022-03-08 NOTE — PROGRESS NOTES
"Chief Complaint  Anxiety and Insomnia    Subjective          Brittny Fay presents to [facility]  History of Present Illness  Here for AWV and for f/u on labs.  C/o insomnia and OAB at night.  She is asking about the 1/2 of a 1 mg ativan qhs which she takes at 1 am when she wakes to urinate.  She has 4 x nocturia.  Elavil caused hyperactivity when she took it twice for IC and trazodone didn't work.  Melatonin doesn't work.  Denies depression but she does focus on things that cause anxiety at night.    Objective   Vital Signs:   /86   Pulse 64   Temp 96.4 °F (35.8 °C) (Temporal)   Resp 16   Ht 162.6 cm (64\")   Wt 57.3 kg (126 lb 6.4 oz)   SpO2 98%   BMI 21.70 kg/m²     Physical Exam  Vitals and nursing note reviewed.   Constitutional:       Appearance: Normal appearance. She is well-developed and normal weight.   HENT:      Head: Normocephalic and atraumatic.      Right Ear: External ear normal.      Left Ear: External ear normal.   Eyes:      Extraocular Movements: Extraocular movements intact.      Conjunctiva/sclera: Conjunctivae normal.   Neck:      Vascular: No carotid bruit.   Cardiovascular:      Rate and Rhythm: Normal rate and regular rhythm.      Heart sounds: Normal heart sounds.      Comments: No bruits  Pulmonary:      Effort: Pulmonary effort is normal. No respiratory distress.      Breath sounds: Normal breath sounds. No wheezing or rales.   Abdominal:      General: Bowel sounds are normal. There is no distension.      Palpations: Abdomen is soft. There is no mass.      Tenderness: There is no abdominal tenderness.   Musculoskeletal:      Cervical back: Neck supple.   Lymphadenopathy:      Cervical: No cervical adenopathy.   Skin:     General: Skin is warm.   Neurological:      General: No focal deficit present.      Mental Status: She is alert and oriented to person, place, and time.   Psychiatric:         Mood and Affect: Mood normal.         Behavior: " Behavior normal.         Thought Content: Thought content normal.         Judgment: Judgment normal.        Result Review :                CBC & Differential (02/28/2022 09:28)  Comprehensive Metabolic Panel (02/28/2022 09:28)  Lipid Panel With LDL / HDL Ratio (02/28/2022 09:28)  TSH (02/28/2022 09:28)  T4, Free (02/28/2022 09:28)  Hemoglobin A1c (02/28/2022 09:28)    Assessment and Plan    Diagnoses and all orders for this visit:    1. Screening mammogram, encounter for (Primary)  -     Mammo Screening Bilateral With CAD; Future    2. Benign essential hypertension  -     amLODIPine (NORVASC) 10 MG tablet; Take 1 tablet by mouth Daily.  Dispense: 90 tablet; Refill: 1    3. Elevated fasting glucose    4. Depression with anxiety    5. Insomnia, unspecified type    6. Generalized anxiety disorder  -     LORazepam (ATIVAN) 1 MG tablet; Take 1 tablet by mouth Daily As Needed for Anxiety.  Dispense: 90 tablet; Refill: 0        Follow Up   Return in about 6 months (around 9/8/2022) for Recheck.  Patient was given instructions and counseling regarding her condition or for health maintenance advice. Please see specific information pulled into the AVS if appropriate.     Refill lorazepam 1 mg 1/2 tablet at night as needed for sleep.  We spent a long time discussing options for sleep and insomnia.  I suggested that she see a sleep psychologist but she states she has seen a sleep psychologist in the past who tried cognitive behavioral therapy and that made her insomnia worse.  I suggested Elavil and she said that made her more hyperactive and have a difficult time sleeping.  I suggested trazodone which she said does not work for her.  I did explain that I cannot increase her Ativan and that half a tablet at night is the maximum that I feel comfortable prescribing.  I also advised her that there are long-term studies that show some cognitive decline in patients taking long-term benzodiazepines.  Carlos has been reviewed.  I have  refilled her amlodipine 10 mg for blood pressure which is well controlled today.  We reviewed her blood work and she will work on lowering sugar and carbs in her diet and getting exercise.  I have asked for a copy of her living will.  We have reviewed her vaccinations.  I did order screening mammogram.

## 2022-05-01 NOTE — TELEPHONE ENCOUNTER
----- Message from Gladis Garner MA sent at 3/6/2017  8:52 AM EST -----   LOV  2/6/17  NARC AND BRYAN     ----- Message -----     From: Rula Dye     Sent: 3/6/2017   8:27 AM       To: [facility]    RX REFILL    LORAZEPAM 1MG  #90  LAST FILL DATE EARYL December  PT HAS 10 PILLS LEFT  HARJIT JEFFERS  CALL BACK [phone]    PT USES [facility], PHONE NUMBER [phone].         SENT TO MAIL ORDER PEÑANA  
01-May-2022 01:41

## 2022-05-04 ENCOUNTER — APPOINTMENT (OUTPATIENT)
Dept: GENERAL RADIOLOGY | Facility: HOSPITAL | Age: 87
End: 2022-05-04

## 2022-05-04 PROCEDURE — 73630 X-RAY EXAM OF FOOT: CPT | Performed by: FAMILY MEDICINE

## 2022-05-04 PROCEDURE — 73610 X-RAY EXAM OF ANKLE: CPT | Performed by: FAMILY MEDICINE

## 2022-05-09 ENCOUNTER — HOSPITAL ENCOUNTER (OUTPATIENT)
Dept: MAMMOGRAPHY | Facility: HOSPITAL | Age: 87
Discharge: HOME | End: 2022-05-09

## 2022-05-09 DIAGNOSIS — Z12.31 SCREENING MAMMOGRAM, ENCOUNTER FOR: ICD-10-CM

## 2022-06-15 ENCOUNTER — HOSPITAL ENCOUNTER (OUTPATIENT)
Dept: GENERAL RADIOLOGY | Facility: HOSPITAL | Age: 87
Discharge: HOME | End: 2022-06-15
Admitting: NURSE PRACTITIONER

## 2022-06-15 ENCOUNTER — OFFICE VISIT (OUTPATIENT)
Dept: FAMILY MEDICINE CLINIC | Facility: CLINIC | Age: 87
End: 2022-06-15

## 2022-06-15 VITALS
WEIGHT: 124.1 LBS | TEMPERATURE: 96.6 F | RESPIRATION RATE: 18 BRPM | BODY MASS INDEX: 21.19 KG/M2 | DIASTOLIC BLOOD PRESSURE: 78 MMHG | HEART RATE: 88 BPM | SYSTOLIC BLOOD PRESSURE: 118 MMHG | HEIGHT: 64 IN | OXYGEN SATURATION: 97 %

## 2022-06-15 DIAGNOSIS — M54.42 ACUTE LEFT-SIDED LOW BACK PAIN WITH LEFT-SIDED SCIATICA: ICD-10-CM

## 2022-06-15 DIAGNOSIS — R30.0 DYSURIA: ICD-10-CM

## 2022-06-15 DIAGNOSIS — M54.42 ACUTE LEFT-SIDED LOW BACK PAIN WITH LEFT-SIDED SCIATICA: Primary | ICD-10-CM

## 2022-06-15 LAB
BILIRUB BLD-MCNC: NEGATIVE MG/DL
CLARITY, POC: CLEAR
COLOR UR: YELLOW
EXPIRATION DATE: ABNORMAL
GLUCOSE UR STRIP-MCNC: NEGATIVE MG/DL
KETONES UR QL: NEGATIVE
LEUKOCYTE EST, POC: ABNORMAL
Lab: ABNORMAL
NITRITE UR-MCNC: NEGATIVE MG/ML
PH UR: 5.5 [PH] (ref 5–8)
PROT UR STRIP-MCNC: NEGATIVE MG/DL
RBC # UR STRIP: NEGATIVE /UL
SP GR UR: 1.02 (ref 1–1.03)
UROBILINOGEN UR QL: NORMAL

## 2022-06-15 PROCEDURE — 72100 X-RAY EXAM L-S SPINE 2/3 VWS: CPT

## 2022-06-15 PROCEDURE — 99213 OFFICE O/P EST LOW 20 MIN: CPT | Performed by: NURSE PRACTITIONER

## 2022-06-15 PROCEDURE — 81003 URINALYSIS AUTO W/O SCOPE: CPT | Performed by: NURSE PRACTITIONER

## 2022-06-15 RX ORDER — METHYLPREDNISOLONE 4 MG/1
TABLET ORAL
Qty: 21 TABLET | Refills: 0 | Status: SHIPPED | OUTPATIENT
Start: 2022-06-15 | End: 2022-09-07

## 2022-06-15 NOTE — PATIENT INSTRUCTIONS
Medrol dose pack instructions  Day 1: Take 3 tablets in the morning, and 3 tablets with dinner  Day 2: take 3 tablets in the morning and 2 tablets with dinner  Day 3: take 2 tablets in the morning and 2 tablets with dinner  Day 4: take 2 tablets in the morning and 1 tablet with dinner  Day 5: take 1 tablet in the morning and 1 tablet with dinner  Day 6: take 1 tablet in the morning

## 2022-06-17 LAB
BACTERIA UR CULT: NORMAL
BACTERIA UR CULT: NORMAL

## 2022-07-28 ENCOUNTER — HOSPITAL ENCOUNTER (OUTPATIENT)
Dept: MAMMOGRAPHY | Facility: HOSPITAL | Age: 87
Discharge: HOME | End: 2022-07-28
Admitting: INTERNAL MEDICINE

## 2022-07-28 PROCEDURE — 77067 SCR MAMMO BI INCL CAD: CPT

## 2022-07-28 PROCEDURE — 77063 BREAST TOMOSYNTHESIS BI: CPT

## 2022-08-29 DIAGNOSIS — I10 BENIGN ESSENTIAL HYPERTENSION: ICD-10-CM

## 2022-08-29 DIAGNOSIS — R73.01 ELEVATED FASTING GLUCOSE: Primary | ICD-10-CM

## 2022-08-29 DIAGNOSIS — E78.89 ELEVATED HDL: ICD-10-CM

## 2022-09-07 ENCOUNTER — OFFICE VISIT (OUTPATIENT)
Dept: FAMILY MEDICINE CLINIC | Facility: CLINIC | Age: 87
End: 2022-09-07

## 2022-09-07 VITALS
HEART RATE: 76 BPM | HEIGHT: 64 IN | DIASTOLIC BLOOD PRESSURE: 74 MMHG | OXYGEN SATURATION: 96 % | SYSTOLIC BLOOD PRESSURE: 118 MMHG | BODY MASS INDEX: 20.57 KG/M2 | WEIGHT: 120.5 LBS | RESPIRATION RATE: 16 BRPM | TEMPERATURE: 96.4 F

## 2022-09-07 DIAGNOSIS — G47.00 INSOMNIA, UNSPECIFIED TYPE: Primary | ICD-10-CM

## 2022-09-07 DIAGNOSIS — F41.1 GENERALIZED ANXIETY DISORDER: ICD-10-CM

## 2022-09-07 PROCEDURE — 99214 OFFICE O/P EST MOD 30 MIN: CPT | Performed by: INTERNAL MEDICINE

## 2022-09-07 RX ORDER — LORAZEPAM 1 MG/1
1 TABLET ORAL DAILY PRN
Qty: 90 TABLET | Refills: 0 | Status: SHIPPED | OUTPATIENT
Start: 2022-09-07 | End: 2023-03-07 | Stop reason: SDUPTHER

## 2022-09-07 RX ORDER — MIRTAZAPINE 15 MG/1
15 TABLET, FILM COATED ORAL NIGHTLY
Qty: 90 TABLET | Refills: 1 | Status: SHIPPED | OUTPATIENT
Start: 2022-09-07 | End: 2023-03-07 | Stop reason: SDUPTHER

## 2022-09-07 NOTE — PROGRESS NOTES
"Chief Complaint  Follow-up (Anxiety/insomnia )    Subjective        Brittny Fay presents to [facility]  History of Present Illness  Patient is here today to discuss anxiety and insomnia which is worsened recently after her grandson, 32 years of age, was diagnosed with stage IV liver disease due to congenital heart disease.  At the age of 4 he had Fontan procedure due to Congenital heart disease which has led to liver disease therefore he will need a double liver/heart transplant at [facility].  She has a hard time sleeping anyway despite taking magnesium and melatonin and then when she wakes up in the night and takes 1/2 tab of ativan 1 mg to help her sleep through the night.  She has fatigue during the day and loss of motivation to do things like exercise or go to outings.   Objective   Vital Signs:  /74 (BP Location: Right arm, Patient Position: Sitting, Cuff Size: Small Adult)   Pulse 76   Temp 96.4 °F (35.8 °C) (Temporal)   Resp 16   Ht 162.6 cm (64.02\")   Wt 54.7 kg (120 lb 8 oz)   SpO2 96%   BMI 20.67 kg/m²   Estimated body mass index is 20.67 kg/m² as calculated from the following:    Height as of this encounter: 162.6 cm (64.02\").    Weight as of this encounter: 54.7 kg (120 lb 8 oz).    BMI is within normal parameters. No other follow-up for BMI required.      Physical Exam  Vitals and nursing note reviewed.   Constitutional:       Appearance: Normal appearance. She is well-developed.   HENT:      Head: Normocephalic and atraumatic.      Right Ear: External ear normal.      Left Ear: External ear normal.   Eyes:      Extraocular Movements: Extraocular movements intact.      Conjunctiva/sclera: Conjunctivae normal.   Neck:      Vascular: No carotid bruit.   Cardiovascular:      Rate and Rhythm: Normal rate and regular rhythm.      Heart sounds: Normal heart sounds.      Comments: No bruits  Pulmonary:      Effort: Pulmonary effort is normal. No respiratory distress. "      Breath sounds: Normal breath sounds. No wheezing or rales.   Abdominal:      General: Bowel sounds are normal. There is no distension.      Palpations: Abdomen is soft. There is no mass.      Tenderness: There is no abdominal tenderness.   Musculoskeletal:      Cervical back: Neck supple.   Lymphadenopathy:      Cervical: No cervical adenopathy.   Skin:     General: Skin is warm.   Neurological:      General: No focal deficit present.      Mental Status: She is alert and oriented to person, place, and time.   Psychiatric:         Mood and Affect: Mood normal.         Behavior: Behavior normal.         Thought Content: Thought content normal.         Judgment: Judgment normal.        Result Review :                Assessment and Plan   Diagnoses and all orders for this visit:    1. Insomnia, unspecified type (Primary)    2. Generalized anxiety disorder  -     LORazepam (ATIVAN) 1 MG tablet; Take 1 tablet by mouth Daily As Needed for Anxiety.  Dispense: 90 tablet; Refill: 0    Other orders  -     mirtazapine (Remeron) 15 MG tablet; Take 1 tablet by mouth Every Night.  Dispense: 90 tablet; Refill: 1    Patient has longstanding anxiety and insomnia.  She has tried Lexapro, mirtazapine at 30 mg a day, doxepin, trazodone, Ambien, lorazepam.  She does not recall if she had benefit from mirtazapine or not with sleep and/or anxiety.  I am reluctant to increase her Ativan based on her age and the cognitive side effects as well as increase in fall risk with her getting up during the night to go to the bathroom.  She understands my concerns and supports safety first.  I do think she would benefit from Remeron at a lower dose which does help sleep and can improve mood and decrease anxiety.  She is in agreement.  I have prescribed Remeron 15 mg nightly and I have a advised her that it may take up to 2 to 3 weeks to see improvement.  Usually the improvement in sleep is seen quicker than 2 to 3 weeks.  She will use the Ativan  only as needed.  She understands that she can continue taking magnesium at night.  She will message me through [email or web address] in the next couple weeks to give me an update on how she is doing.  Contract has been signed and Carlos reviewed.         Follow Up   No follow-ups on file.  Patient was given instructions and counseling regarding her condition or for health maintenance advice. Please see specific information pulled into the AVS if appropriate.

## 2022-09-15 ENCOUNTER — TELEPHONE (OUTPATIENT)
Dept: FAMILY MEDICINE CLINIC | Facility: CLINIC | Age: 87
End: 2022-09-15

## 2022-10-11 DIAGNOSIS — I10 BENIGN ESSENTIAL HYPERTENSION: ICD-10-CM

## 2022-10-12 RX ORDER — AMLODIPINE BESYLATE 10 MG/1
TABLET ORAL
Qty: 90 TABLET | Refills: 1 | Status: SHIPPED | OUTPATIENT
Start: 2022-10-12 | End: 2023-03-07 | Stop reason: SDUPTHER

## 2022-10-12 RX ORDER — ESOMEPRAZOLE MAGNESIUM 40 MG/1
CAPSULE, DELAYED RELEASE ORAL
Qty: 90 CAPSULE | Refills: 0 | Status: SHIPPED | OUTPATIENT
Start: 2022-10-12 | End: 2023-01-09

## 2022-10-12 NOTE — TELEPHONE ENCOUNTER
Rx Refill Note  Requested Prescriptions     Pending Prescriptions Disp Refills   • amLODIPine (NORVASC) 10 MG tablet [Pharmacy Med Name: AMLODIPINE TAB 10MG] 90 tablet 1     Sig: TAKE 1 TABLET DAILY      Last office visit with prescribing clinician: 9/7/2022      Next office visit with prescribing clinician: 3/7/2023            Duyen Vila MA  10/12/22, 14:39 EDT

## 2022-11-03 ENCOUNTER — TELEPHONE (OUTPATIENT)
Dept: FAMILY MEDICINE CLINIC | Facility: CLINIC | Age: 87
End: 2022-11-03

## 2022-11-08 DIAGNOSIS — F41.1 GENERALIZED ANXIETY DISORDER: ICD-10-CM

## 2022-11-08 NOTE — TELEPHONE ENCOUNTER
Rx Refill Note  Requested Prescriptions     Pending Prescriptions Disp Refills   • LORazepam (ATIVAN) 1 MG tablet [Pharmacy Med Name: LORAZEPAM TAB 1MG] 90 tablet 0     Sig: TAKE 1 TABLET DAILY AS     NEEDED FOR ANXIETY      Last office visit with prescribing clinician: 9/7/2022      Next office visit with prescribing clinician: 3/7/2023            Duyen Vila MA  11/08/22, 16:29 EST     Needs updated contract    (0) understands/communicates without difficulty

## 2022-11-09 DIAGNOSIS — R73.01 ELEVATED FASTING GLUCOSE: ICD-10-CM

## 2022-11-09 DIAGNOSIS — I10 BENIGN ESSENTIAL HYPERTENSION: Primary | ICD-10-CM

## 2022-11-09 DIAGNOSIS — E04.2 NON-TOXIC MULTINODULAR GOITER: ICD-10-CM

## 2022-11-11 NOTE — TELEPHONE ENCOUNTER
Pt accidentally requested it she just needed Remeron, She stated when requesting medication she didn't know generic name of medication and reached out to pharmacy to cancel Lorazepam request, but by then they had already sent it to us. Pt does not need medication at this time.

## 2022-11-14 RX ORDER — LORAZEPAM 1 MG/1
TABLET ORAL
Qty: 90 TABLET | Refills: 0 | OUTPATIENT
Start: 2022-11-14

## 2023-01-09 RX ORDER — ESOMEPRAZOLE MAGNESIUM 40 MG/1
CAPSULE, DELAYED RELEASE ORAL
Qty: 90 CAPSULE | Refills: 0 | Status: SHIPPED | OUTPATIENT
Start: 2023-01-09

## 2023-02-28 DIAGNOSIS — R73.01 ELEVATED FASTING GLUCOSE: ICD-10-CM

## 2023-02-28 DIAGNOSIS — E04.2 NON-TOXIC MULTINODULAR GOITER: ICD-10-CM

## 2023-02-28 DIAGNOSIS — I10 BENIGN ESSENTIAL HYPERTENSION: ICD-10-CM

## 2023-03-01 LAB
ALBUMIN SERPL-MCNC: 4.5 G/DL (ref 3.5–5.2)
ALBUMIN/GLOB SERPL: 1.8 G/DL
ALP SERPL-CCNC: 77 U/L (ref 39–117)
ALT SERPL-CCNC: 15 U/L (ref 1–33)
AST SERPL-CCNC: 19 U/L (ref 1–32)
BILIRUB SERPL-MCNC: 0.5 MG/DL (ref 0–1.2)
BUN SERPL-MCNC: 18 MG/DL (ref 8–23)
BUN/CREAT SERPL: 21.7 (ref 7–25)
CALCIUM SERPL-MCNC: 8.8 MG/DL (ref 8.6–10.5)
CHLORIDE SERPL-SCNC: 103 MMOL/L (ref 98–107)
CHOLEST SERPL-MCNC: 180 MG/DL (ref 0–200)
CO2 SERPL-SCNC: 26.5 MMOL/L (ref 22–29)
CREAT SERPL-MCNC: 0.83 MG/DL (ref 0.57–1)
EGFRCR SERPLBLD CKD-EPI 2021: 68.8 ML/MIN/1.73
GLOBULIN SER CALC-MCNC: 2.5 GM/DL
GLUCOSE SERPL-MCNC: 97 MG/DL (ref 65–99)
HBA1C MFR BLD: 5.6 % (ref 4.8–5.6)
HDLC SERPL-MCNC: 71 MG/DL (ref 40–60)
LDLC SERPL CALC-MCNC: 94 MG/DL (ref 0–100)
LDLC/HDLC SERPL: 1.3 {RATIO}
POTASSIUM SERPL-SCNC: 4.2 MMOL/L (ref 3.5–5.2)
PROT SERPL-MCNC: 7 G/DL (ref 6–8.5)
SODIUM SERPL-SCNC: 142 MMOL/L (ref 136–145)
T4 FREE SERPL-MCNC: 1.02 NG/DL (ref 0.93–1.7)
TRIGL SERPL-MCNC: 84 MG/DL (ref 0–150)
TSH SERPL DL<=0.005 MIU/L-ACNC: 1.04 UIU/ML (ref 0.27–4.2)
VLDLC SERPL CALC-MCNC: 15 MG/DL (ref 5–40)

## 2023-03-07 ENCOUNTER — OFFICE VISIT (OUTPATIENT)
Dept: FAMILY MEDICINE CLINIC | Facility: CLINIC | Age: 88
End: 2023-03-07
Payer: MEDICARE

## 2023-03-07 ENCOUNTER — TELEPHONE (OUTPATIENT)
Dept: FAMILY MEDICINE CLINIC | Facility: CLINIC | Age: 88
End: 2023-03-07

## 2023-03-07 VITALS
RESPIRATION RATE: 16 BRPM | HEART RATE: 84 BPM | OXYGEN SATURATION: 98 % | SYSTOLIC BLOOD PRESSURE: 140 MMHG | BODY MASS INDEX: 20.38 KG/M2 | HEIGHT: 64 IN | WEIGHT: 119.4 LBS | DIASTOLIC BLOOD PRESSURE: 72 MMHG

## 2023-03-07 DIAGNOSIS — K21.00 GASTROESOPHAGEAL REFLUX DISEASE WITH ESOPHAGITIS WITHOUT HEMORRHAGE: Primary | ICD-10-CM

## 2023-03-07 DIAGNOSIS — R73.01 ELEVATED FASTING GLUCOSE: ICD-10-CM

## 2023-03-07 DIAGNOSIS — F41.1 GENERALIZED ANXIETY DISORDER: ICD-10-CM

## 2023-03-07 DIAGNOSIS — N32.81 OVERACTIVE BLADDER: ICD-10-CM

## 2023-03-07 DIAGNOSIS — I10 BENIGN ESSENTIAL HYPERTENSION: ICD-10-CM

## 2023-03-07 DIAGNOSIS — G47.00 INSOMNIA, UNSPECIFIED TYPE: ICD-10-CM

## 2023-03-07 LAB
BASOPHILS # BLD AUTO: 0.04 10*3/MM3 (ref 0–0.2)
BASOPHILS NFR BLD AUTO: 0.6 % (ref 0–1.5)
EOSINOPHIL # BLD AUTO: 0.19 10*3/MM3 (ref 0–0.4)
EOSINOPHIL NFR BLD AUTO: 3 % (ref 0.3–6.2)
ERYTHROCYTE [DISTWIDTH] IN BLOOD BY AUTOMATED COUNT: 12.3 % (ref 12.3–15.4)
HCT VFR BLD AUTO: 40.9 % (ref 34–46.6)
HGB BLD-MCNC: 13.3 G/DL (ref 12–15.9)
IMM GRANULOCYTES # BLD AUTO: 0.01 10*3/MM3 (ref 0–0.05)
IMM GRANULOCYTES NFR BLD AUTO: 0.2 % (ref 0–0.5)
LYMPHOCYTES # BLD AUTO: 1.87 10*3/MM3 (ref 0.7–3.1)
LYMPHOCYTES NFR BLD AUTO: 30 % (ref 19.6–45.3)
MCH RBC QN AUTO: 28.7 PG (ref 26.6–33)
MCHC RBC AUTO-ENTMCNC: 32.5 G/DL (ref 31.5–35.7)
MCV RBC AUTO: 88.3 FL (ref 79–97)
MONOCYTES # BLD AUTO: 0.63 10*3/MM3 (ref 0.1–0.9)
MONOCYTES NFR BLD AUTO: 10.1 % (ref 5–12)
NEUTROPHILS # BLD AUTO: 3.5 10*3/MM3 (ref 1.7–7)
NEUTROPHILS NFR BLD AUTO: 56.1 % (ref 42.7–76)
NRBC BLD AUTO-RTO: 0 /100 WBC (ref 0–0.2)
PLATELET # BLD AUTO: 273 10*3/MM3 (ref 140–450)
RBC # BLD AUTO: 4.63 10*6/MM3 (ref 3.77–5.28)
WBC # BLD AUTO: 6.24 10*3/MM3 (ref 3.4–10.8)

## 2023-03-07 PROCEDURE — 99214 OFFICE O/P EST MOD 30 MIN: CPT | Performed by: INTERNAL MEDICINE

## 2023-03-07 RX ORDER — LORAZEPAM 1 MG/1
1 TABLET ORAL DAILY PRN
Qty: 90 TABLET | Refills: 0 | Status: SHIPPED | OUTPATIENT
Start: 2023-03-07

## 2023-03-07 RX ORDER — SOLIFENACIN SUCCINATE 5 MG/1
TABLET, FILM COATED ORAL
COMMUNITY
End: 2023-03-07 | Stop reason: SDUPTHER

## 2023-03-07 RX ORDER — SOLIFENACIN SUCCINATE 5 MG/1
5 TABLET, FILM COATED ORAL DAILY
Qty: 90 TABLET | Refills: 1 | Status: SHIPPED | OUTPATIENT
Start: 2023-03-07 | End: 2023-03-10 | Stop reason: SDUPTHER

## 2023-03-07 RX ORDER — MIRTAZAPINE 15 MG/1
15 TABLET, FILM COATED ORAL NIGHTLY
Qty: 90 TABLET | Refills: 1 | Status: SHIPPED | OUTPATIENT
Start: 2023-03-07

## 2023-03-07 RX ORDER — AMLODIPINE BESYLATE 10 MG/1
10 TABLET ORAL DAILY
Qty: 90 TABLET | Refills: 1 | Status: SHIPPED | OUTPATIENT
Start: 2023-03-07

## 2023-03-07 NOTE — PROGRESS NOTES
"Chief Complaint  Insomnia, Hypertension, and Cyst (Pt would like to know if she needed to repeat kidney cyst testing to follow along with it )    Subjective        Brittny Fay presents to [facility]  History of Present Illness  Pt is here for OAB--never started vesicare b/c was afraid of side effects, insomnia, anxiety now on remeron 15 mg qhs and is seeing a huge benefit from remeron--not taking ativan nearly as often and prescription is lasting longer, HTN on norvasc 10 mg qd.  Years ago--maybe 4+ years ago, had CT scan that showed renal cysts and f/u CT was stable.  Asking if need to repeat CT.  No current complaints of   Objective   Vital Signs:  /72   Pulse 84   Resp 16   Ht 162.6 cm (64.02\")   Wt 54.2 kg (119 lb 6.4 oz)   SpO2 98%   BMI 20.48 kg/m²   Estimated body mass index is 20.48 kg/m² as calculated from the following:    Height as of this encounter: 162.6 cm (64.02\").    Weight as of this encounter: 54.2 kg (119 lb 6.4 oz).       BMI is within normal parameters. No other follow-up for BMI required.      Physical Exam  Vitals and nursing note reviewed.   Constitutional:       Appearance: Normal appearance. She is well-developed.   HENT:      Head: Normocephalic and atraumatic.      Right Ear: External ear normal.      Left Ear: External ear normal.   Eyes:      Extraocular Movements: Extraocular movements intact.      Conjunctiva/sclera: Conjunctivae normal.   Neck:      Vascular: No carotid bruit.   Cardiovascular:      Rate and Rhythm: Normal rate and regular rhythm.      Heart sounds: Normal heart sounds.      Comments: No bruits  Pulmonary:      Effort: Pulmonary effort is normal. No respiratory distress.      Breath sounds: Normal breath sounds. No stridor. No wheezing, rhonchi or rales.   Chest:      Chest wall: No tenderness.   Abdominal:      General: Bowel sounds are normal. There is no distension.      Palpations: Abdomen is soft. There is no mass. "      Tenderness: There is no abdominal tenderness. There is no guarding or rebound.      Hernia: No hernia is present.   Musculoskeletal:      Cervical back: Neck supple.   Lymphadenopathy:      Cervical: No cervical adenopathy.   Skin:     General: Skin is warm.   Neurological:      General: No focal deficit present.      Mental Status: She is alert and oriented to person, place, and time.   Psychiatric:         Mood and Affect: Mood normal.         Behavior: Behavior normal.         Thought Content: Thought content normal.         Judgment: Judgment normal.        Result Review :                   Assessment and Plan   Diagnoses and all orders for this visit:    1. Gastroesophageal reflux disease with esophagitis without hemorrhage (Primary)  -     CBC & Differential    2. Generalized anxiety disorder  -     mirtazapine (Remeron) 15 MG tablet; Take 1 tablet by mouth Every Night.  Dispense: 90 tablet; Refill: 1  -     LORazepam (ATIVAN) 1 MG tablet; Take 1 tablet by mouth Daily As Needed for Anxiety.  Dispense: 90 tablet; Refill: 0    3. Benign essential hypertension  -     amLODIPine (NORVASC) 10 MG tablet; Take 1 tablet by mouth Daily.  Dispense: 90 tablet; Refill: 1    4. Insomnia, unspecified type    5. Elevated fasting glucose    6. Overactive bladder  -     solifenacin (VESICARE) 5 MG tablet; Take 1 tablet by mouth Daily.  Dispense: 90 tablet; Refill: 1    anxiety/insomnia--remeron 15 mg qhs and prn ativan --refills provided.  Trista alexander and ernie reviewed  OAB--we discussed that she might benefit from trying vesicare 5 mg qd for nocturia  HTN--norvasc 10 mg qd--BP is controlled  Labs reviewed and look great.    Hemoglobin A1c (02/28/2023 09:59)  Lipid Panel With LDL / HDL Ratio (02/28/2023 09:59)  Comprehensive Metabolic Panel (02/28/2023 09:59)  TSH (02/28/2023 09:59)  T4, Free (02/28/2023 09:59)         Follow Up   No follow-ups on file.  Patient was given instructions and counseling regarding her  condition or for health maintenance advice. Please see specific information pulled into the AVS if appropriate.

## 2023-03-10 ENCOUNTER — TELEPHONE (OUTPATIENT)
Dept: FAMILY MEDICINE CLINIC | Facility: CLINIC | Age: 88
End: 2023-03-10
Payer: MEDICARE

## 2023-03-10 DIAGNOSIS — N32.81 OVERACTIVE BLADDER: ICD-10-CM

## 2023-03-10 RX ORDER — SOLIFENACIN SUCCINATE 5 MG/1
5 TABLET, FILM COATED ORAL DAILY
Qty: 90 TABLET | Refills: 1 | Status: SHIPPED | OUTPATIENT
Start: 2023-03-10

## 2023-03-10 NOTE — TELEPHONE ENCOUNTER
Pt was called, where she advised me she would like medication to go to local pharmacy as [facility] told her they only fill 90 day scripts. I did as patient requested and sent to local pharmacy.

## 2023-03-10 NOTE — TELEPHONE ENCOUNTER
Caller: Brittny Fay    Relationship: Self    Best call back number:[phone]    Who are you requesting to speak with (clinical staff, provider,  specific staff member): CLINICAL STAFF     What was the call regarding: HAS QUESTION ABOUT PRESCRIPTION THAT CAN NOT GET REFILLED solifenacin (VESICARE) 5 MG tablet    Do you require a callback: YES

## 2023-04-09 DIAGNOSIS — I10 BENIGN ESSENTIAL HYPERTENSION: ICD-10-CM

## 2023-04-10 ENCOUNTER — TELEPHONE (OUTPATIENT)
Dept: GASTROENTEROLOGY | Facility: CLINIC | Age: 88
End: 2023-04-10
Payer: MEDICARE

## 2023-04-10 RX ORDER — AMLODIPINE BESYLATE 10 MG/1
TABLET ORAL
Qty: 90 TABLET | Refills: 1 | Status: SHIPPED | OUTPATIENT
Start: 2023-04-10

## 2023-04-10 RX ORDER — ESOMEPRAZOLE MAGNESIUM 40 MG/1
CAPSULE, DELAYED RELEASE ORAL
Qty: 90 CAPSULE | Refills: 0 | OUTPATIENT
Start: 2023-04-10

## 2023-04-10 NOTE — TELEPHONE ENCOUNTER
Rx Refill Note  Requested Prescriptions     Pending Prescriptions Disp Refills   • amLODIPine (NORVASC) 10 MG tablet [Pharmacy Med Name: AMLODIPINE TAB 10MG] 90 tablet 1     Sig: TAKE 1 TABLET DAILY      Last office visit with prescribing clinician: 3/7/2023   Last telemedicine visit with prescribing clinician: 8/31/2023   Next office visit with prescribing clinician: 9/8/2023                         Would you like a call back once the refill request has been completed: [] Yes [] No    If the office needs to give you a call back, can they leave a voicemail: [] Yes [] No    Eliza Park MA  04/10/23, 09:29 EDT

## 2023-04-10 NOTE — TELEPHONE ENCOUNTER
Caller: Brittny Fay    Relationship to patient: Self    Best call back number: [phone]      Patient is needing: TO REFILL HER NEXIUM 40 MG. SHE IS GETTING A MESSAGE FROM THE PHARMACY THAT SAYS THE MEDICATION IS BEING REJECTED BY THE PRESCRIBER.    PLEASE CALL PATIENT TO ADVISE.

## 2023-04-12 RX ORDER — ESOMEPRAZOLE MAGNESIUM 40 MG/1
40 CAPSULE, DELAYED RELEASE ORAL DAILY
Qty: 90 CAPSULE | Refills: 0 | Status: SHIPPED | OUTPATIENT
Start: 2023-04-12 | End: 2023-04-13 | Stop reason: SDUPTHER

## 2023-04-12 NOTE — TELEPHONE ENCOUNTER
Refilled x1, 0RF with note to make appointment for further refills.     Sent to HARSHIL Beard to navdeep.

## 2023-04-13 ENCOUNTER — TELEPHONE (OUTPATIENT)
Dept: GASTROENTEROLOGY | Facility: CLINIC | Age: 88
End: 2023-04-13
Payer: MEDICARE

## 2023-04-13 ENCOUNTER — TELEPHONE (OUTPATIENT)
Dept: GASTROENTEROLOGY | Facility: CLINIC | Age: 88
End: 2023-04-13

## 2023-04-13 RX ORDER — ESOMEPRAZOLE MAGNESIUM 40 MG/1
40 CAPSULE, DELAYED RELEASE ORAL DAILY
Qty: 90 CAPSULE | Refills: 0 | Status: SHIPPED | OUTPATIENT
Start: 2023-04-13

## 2023-04-13 NOTE — TELEPHONE ENCOUNTER
DELETE AFTER REVIEWING: Telephone encounter to be sent to the clinical pool   Hub staff attempted to follow warm transfer process and was unsuccessful     Caller: PT    Relationship to patient:     Best call back number: [phone]    Patient is needing: PT CANCELLED SCRIPT AT [facility]. NEEDS ORDER SENT TO [facility]         DELETE AFTER READING TO PATIENT: “I was unable to reach my scheduling contact. I will send a message to the scheduling team. Please allow 48 hours for the  staff to follow up on this request.”

## 2023-04-29 ENCOUNTER — HOSPITAL ENCOUNTER (EMERGENCY)
Facility: HOSPITAL | Age: 88
Discharge: HOME | End: 2023-04-29
Attending: EMERGENCY MEDICINE
Payer: MEDICARE

## 2023-04-29 ENCOUNTER — APPOINTMENT (OUTPATIENT)
Dept: CT IMAGING | Facility: HOSPITAL | Age: 88
End: 2023-04-29
Payer: MEDICARE

## 2023-04-29 VITALS
BODY MASS INDEX: 20.49 KG/M2 | TEMPERATURE: 97.7 F | RESPIRATION RATE: 16 BRPM | OXYGEN SATURATION: 97 % | HEIGHT: 64 IN | WEIGHT: 120 LBS | DIASTOLIC BLOOD PRESSURE: 91 MMHG | HEART RATE: 91 BPM | SYSTOLIC BLOOD PRESSURE: 166 MMHG

## 2023-04-29 DIAGNOSIS — R51.9 ACUTE NONINTRACTABLE HEADACHE, UNSPECIFIED HEADACHE TYPE: Primary | ICD-10-CM

## 2023-04-29 DIAGNOSIS — S09.90XA MINOR HEAD INJURY, INITIAL ENCOUNTER: ICD-10-CM

## 2023-04-29 PROCEDURE — 70450 CT HEAD/BRAIN W/O DYE: CPT

## 2023-04-29 PROCEDURE — G0463 HOSPITAL OUTPT CLINIC VISIT: HCPCS | Performed by: EMERGENCY MEDICINE

## 2023-04-29 PROCEDURE — 99283 EMERGENCY DEPT VISIT LOW MDM: CPT

## 2023-04-29 PROCEDURE — 63710000001 ONDANSETRON ODT 4 MG TABLET DISPERSIBLE: Performed by: EMERGENCY MEDICINE

## 2023-04-29 RX ORDER — ONDANSETRON 4 MG/1
4 TABLET, ORALLY DISINTEGRATING ORAL ONCE
Status: COMPLETED | OUTPATIENT
Start: 2023-04-29 | End: 2023-04-29

## 2023-04-29 RX ORDER — HYDROCODONE BITARTRATE AND ACETAMINOPHEN 5; 325 MG/1; MG/1
0.5 TABLET ORAL ONCE
Status: COMPLETED | OUTPATIENT
Start: 2023-04-29 | End: 2023-04-29

## 2023-04-29 RX ADMIN — HYDROCODONE BITARTRATE AND ACETAMINOPHEN 0.5 TABLET: 5; 325 TABLET ORAL at 08:43

## 2023-04-29 RX ADMIN — ONDANSETRON 4 MG: 4 TABLET, ORALLY DISINTEGRATING ORAL at 08:43

## 2023-04-29 NOTE — ED TRIAGE NOTES
89 y/o Female hit her head on a cabinet door 2 days ago without loss of consciousness.  Pt has had continued headache without neurological disturbances.  Pt has small amount of edema below right eye.  Pt is A&Ox4, normal gait and mentation.  Ambulated to the room without difficulty.

## 2023-04-29 NOTE — FSED PROVIDER NOTE
Subjective   History of Present Illness  88-year-old female presents complaining of headache.  Patient states that she hit her head on a cabinet 2 days ago and has had gradually increasing headache since that time.  She denies LOC.  No vision or hearing change, numbness or weakness.  She denies any anticoagulant usage, but is concerned because of her age.  She has not been taking any meds.    History provided by:  Patient   used: No        Review of Systems   Constitutional: Negative.  Negative for fever.   Eyes: Negative for visual disturbance.   Respiratory: Negative.  Negative for shortness of breath.    Cardiovascular: Negative.  Negative for chest pain.   Gastrointestinal: Negative.  Negative for abdominal pain and vomiting.   Genitourinary: Negative.  Negative for dysuria.   Neurological: Positive for headaches. Negative for weakness and numbness.   All other systems reviewed and are negative.      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   • Bladder prolapse, female, acquired    • Chest pain    • Chronic interstitial cystitis    • Colon polyps 09/23/2015   • Degeneration of cervical intervertebral disc    • Depression    • Diverticulitis 2008   • Diverticulosis of colon    • Gastritis    • GERD (gastroesophageal reflux disease)    • Hiatal hernia    • Hypertension    • IBS (irritable bowel syndrome)    • Insomnia    • Obstructive sleep apnea    • Osteoarthritis    • Reflux esophagitis    • Solitary thyroid nodule        Allergies   Allergen Reactions   • Cephalosporins Swelling   • Amitriptyline Other (See Comments)     Other reaction(s): alertness/ hyperness   • Bupropion Other (See Comments)     Other reaction(s): insomnia   • Pneumococcal Vaccines Dizziness     Other reaction(s): Dizziness       Past Surgical History:   Procedure Laterality Date   • CATARACT EXTRACTION Bilateral    • COLON RESECTION N/A  03/04/2008    Sigmoid colon resection with low pelvic anastomosis, right [id number]-Gd. Stepehn Kelty, [facility]   • COLON RESECTION  2019    small bowel resection   • CYSTOCELE REPAIR N/A 06/2017   • ENDOSCOPY N/A 4/2/2018    Procedure: ESOPHAGOGASTRODUODENOSCOPY WITH COLD BIOPSIES;  Surgeon: Josiah Weber MD;  Location: [facility] ENDOSCOPY;  Service: Gastroenterology   • ENDOSCOPY N/A 3/15/2021    Procedure: ESOPHAGOGASTRODUODENOSCOPY;  Surgeon: Julio Young MD;  Location: [facility] ENDOSCOPY;  Service: Gastroenterology;  Laterality: N/A;  PRE- BARRETTS ESOPHAGUS  POST- HIATAL HERNIA   • ENDOSCOPY AND COLONOSCOPY N/A 04/29/2014    Gastritis: biopsied, esophagitis: biopsied, normal colonoscopy-Dr. Darren Lancaster [facility]   • EXPLORATORY LAPAROTOMY N/A 5/31/2019    Procedure: exploratory laparotomy;  Surgeon: Silvano Alarcon MD;  Location: [facility] MAIN OR;  Service: General   • LAPAROSCOPIC CHOLECYSTECTOMY N/A 05/25/2010    Dr. Darren Lancaster [facility]   • SMALL INTESTINE SURGERY N/A 2019    emergent due to SBO   • VAGINAL PROLAPSE REPAIR N/A     ALL PELVIC ORGAN PROLAPSE SURGERY   • VENTRAL HERNIA REPAIR N/A 11/03/2015    Repair of complex ventral incisional hernia with mesh-Dr. Darren Lancaster, [facility]       Family History   Problem Relation Age of Onset   • Cancer Mother         Breast cancer   • Breast cancer Mother    • Hypertension Father    • Stroke Father    • Hypertension Brother    • Cancer Maternal Grandmother         breast   • Malig Hyperthermia Neg Hx        Social History     Socioeconomic History   • Marital status:    Tobacco Use   • Smoking status: Former     Packs/day: 1.50     Years: 20.00     Pack years: 30.00     Types: Cigarettes   • Smokeless tobacco: Never   • Tobacco comments:     quit 1970   Vaping Use   • Vaping Use: Never used   Substance and Sexual Activity   • Alcohol use: Yes     Alcohol/week: 5.0 standard drinks     Types: 5 Glasses of wine per week     Comment: socially    • Drug  use: No   • Sexual activity: Defer     Partners: Male           Objective   Physical Exam  Vitals and nursing note reviewed.   Constitutional:       General: She is not in acute distress.     Appearance: She is not diaphoretic.   HENT:      Head: Normocephalic.      Comments: Small R frontal contusion, minor ttp, no stepoffs     Right Ear: External ear normal.      Left Ear: External ear normal.      Nose: Nose normal.      Mouth/Throat:      Mouth: Mucous membranes are moist.   Eyes:      Extraocular Movements: Extraocular movements intact.      Pupils: Pupils are equal, round, and reactive to light.   Cardiovascular:      Rate and Rhythm: Normal rate and regular rhythm.      Heart sounds: Normal heart sounds.   Pulmonary:      Effort: Pulmonary effort is normal. No respiratory distress.      Breath sounds: Normal breath sounds.   Abdominal:      Palpations: Abdomen is soft.      Tenderness: There is no abdominal tenderness.   Musculoskeletal:         General: No swelling or tenderness. Normal range of motion.      Cervical back: Normal range of motion and neck supple.   Skin:     General: Skin is warm and dry.      Findings: No rash.   Neurological:      General: No focal deficit present.      Mental Status: She is alert and oriented to person, place, and time.      Cranial Nerves: No cranial nerve deficit.      Sensory: No sensory deficit.      Motor: No weakness.   Psychiatric:         Mood and Affect: Mood normal.         Behavior: Behavior normal.         Procedures       CT Head Without Contrast   Final Result      NAD      ED Course                                           Medical Decision Making  88-year-old female presenting with headache after minor injury.  She is well-appearing with a normal neuro exam.  Head CT obtained due to her age, negative for any acute findings.  On reassessment after symptom control her headache is resolved.  She is requesting to go home.  Strict return precautions  discussed.    Acute nonintractable headache, unspecified headache type: acute illness or injury  Minor head injury, initial encounter: acute illness or injury  Amount and/or Complexity of Data Reviewed  Radiology: ordered and independent interpretation performed.      Risk  Prescription drug management.          Final diagnoses:   Acute nonintractable headache, unspecified headache type   Minor head injury, initial encounter       ED Disposition  ED Disposition     ED Disposition   Discharge    Condition   Stable    Comment   --             Maria G Call MD  [address] [id number] [phone]    Schedule an appointment as soon as possible for a visit            Medication List      No changes were made to your prescriptions during this visit.

## 2023-05-02 ENCOUNTER — TELEPHONE (OUTPATIENT)
Dept: FAMILY MEDICINE CLINIC | Facility: CLINIC | Age: 88
End: 2023-05-02

## 2023-05-02 NOTE — TELEPHONE ENCOUNTER
Spoke with Dr. Call regarding pts request. Dr. Call advised pt could cancel appt if she would like and schedule for a date with Dr. Call.    Please advise

## 2023-05-02 NOTE — TELEPHONE ENCOUNTER
Caller: Brittny Fay    Relationship: Self    Best call back number: [phone]    What was the call regarding: PATIENT CALLED TO CANCEL APPOINTMENT FOR Friday WITH JAMES EPLEY AND INFORMED THAT SHE WOULD LIKE TO WAIT TIL SHE HAS HER VISIT WITH DR ORELLANA, THEN ENDED CALL.    HUB DID NOT CANCEL, AND INFORMED  OF THIS.    JOE ADVISED SHE WOULD TALK TO NURSE BEFORE CANCELING APPOINTMENT.

## 2023-07-28 ENCOUNTER — TELEPHONE (OUTPATIENT)
Dept: GASTROENTEROLOGY | Facility: CLINIC | Age: 88
End: 2023-07-28
Payer: MEDICARE

## 2023-07-28 RX ORDER — ESOMEPRAZOLE MAGNESIUM 40 MG/1
40 CAPSULE, DELAYED RELEASE ORAL DAILY
Qty: 90 CAPSULE | Refills: 0 | Status: SHIPPED | OUTPATIENT
Start: 2023-07-28

## 2023-07-28 NOTE — TELEPHONE ENCOUNTER
Regarding: FW: Your Approved Medications  Contact: [phone]  Fine to refill medicine until she can see Dr. Young in the office.      ----- Message -----  From: Esha Mccoy  Sent: 7/21/2023   9:12 AM EDT  To: SYDNI Mustafa  Subject: FW: Your Approved Medications                      ----- Message -----  From: Brittny Fay  Sent: 7/7/2023   1:58 PM EDT  To: Russell Steel  Subject: Your Approved Medications                        I made an appointment but it’s not until oct. 12 with Dr. Young.

## 2023-08-31 DIAGNOSIS — K21.00 GASTROESOPHAGEAL REFLUX DISEASE WITH ESOPHAGITIS WITHOUT HEMORRHAGE: ICD-10-CM

## 2023-08-31 DIAGNOSIS — R73.01 ELEVATED FASTING GLUCOSE: ICD-10-CM

## 2023-08-31 DIAGNOSIS — I10 BENIGN ESSENTIAL HYPERTENSION: ICD-10-CM

## 2023-09-01 LAB
ALBUMIN SERPL-MCNC: 5 G/DL (ref 3.5–5.2)
ALBUMIN/GLOB SERPL: 2.2 G/DL
ALP SERPL-CCNC: 80 U/L (ref 39–117)
ALT SERPL-CCNC: 11 U/L (ref 1–33)
AST SERPL-CCNC: 12 U/L (ref 1–32)
BASOPHILS # BLD AUTO: 0.04 10*3/MM3 (ref 0–0.2)
BASOPHILS NFR BLD AUTO: 0.7 % (ref 0–1.5)
BILIRUB SERPL-MCNC: 0.5 MG/DL (ref 0–1.2)
BUN SERPL-MCNC: 22 MG/DL (ref 8–23)
BUN/CREAT SERPL: 25 (ref 7–25)
CALCIUM SERPL-MCNC: 9.5 MG/DL (ref 8.6–10.5)
CHLORIDE SERPL-SCNC: 100 MMOL/L (ref 98–107)
CHOLEST SERPL-MCNC: 177 MG/DL (ref 0–200)
CO2 SERPL-SCNC: 28.3 MMOL/L (ref 22–29)
CREAT SERPL-MCNC: 0.88 MG/DL (ref 0.57–1)
EGFRCR SERPLBLD CKD-EPI 2021: 64.1 ML/MIN/1.73
EOSINOPHIL # BLD AUTO: 0.2 10*3/MM3 (ref 0–0.4)
EOSINOPHIL NFR BLD AUTO: 3.5 % (ref 0.3–6.2)
ERYTHROCYTE [DISTWIDTH] IN BLOOD BY AUTOMATED COUNT: 12.8 % (ref 12.3–15.4)
GLOBULIN SER CALC-MCNC: 2.3 GM/DL
GLUCOSE SERPL-MCNC: 91 MG/DL (ref 65–99)
HBA1C MFR BLD: 5.5 % (ref 4.8–5.6)
HCT VFR BLD AUTO: 43.5 % (ref 34–46.6)
HDLC SERPL-MCNC: 71 MG/DL (ref 40–60)
HGB BLD-MCNC: 14.3 G/DL (ref 12–15.9)
IMM GRANULOCYTES # BLD AUTO: 0.01 10*3/MM3 (ref 0–0.05)
IMM GRANULOCYTES NFR BLD AUTO: 0.2 % (ref 0–0.5)
LDLC SERPL CALC-MCNC: 95 MG/DL (ref 0–100)
LDLC/HDLC SERPL: 1.34 {RATIO}
LYMPHOCYTES # BLD AUTO: 1.86 10*3/MM3 (ref 0.7–3.1)
LYMPHOCYTES NFR BLD AUTO: 32.9 % (ref 19.6–45.3)
MCH RBC QN AUTO: 29.1 PG (ref 26.6–33)
MCHC RBC AUTO-ENTMCNC: 32.9 G/DL (ref 31.5–35.7)
MCV RBC AUTO: 88.6 FL (ref 79–97)
MONOCYTES # BLD AUTO: 0.56 10*3/MM3 (ref 0.1–0.9)
MONOCYTES NFR BLD AUTO: 9.9 % (ref 5–12)
NEUTROPHILS # BLD AUTO: 2.98 10*3/MM3 (ref 1.7–7)
NEUTROPHILS NFR BLD AUTO: 52.8 % (ref 42.7–76)
NRBC BLD AUTO-RTO: 0 /100 WBC (ref 0–0.2)
PLATELET # BLD AUTO: 268 10*3/MM3 (ref 140–450)
POTASSIUM SERPL-SCNC: 4.2 MMOL/L (ref 3.5–5.2)
PROT SERPL-MCNC: 7.3 G/DL (ref 6–8.5)
RBC # BLD AUTO: 4.91 10*6/MM3 (ref 3.77–5.28)
SODIUM SERPL-SCNC: 142 MMOL/L (ref 136–145)
TRIGL SERPL-MCNC: 55 MG/DL (ref 0–150)
VLDLC SERPL CALC-MCNC: 11 MG/DL (ref 5–40)
WBC # BLD AUTO: 5.65 10*3/MM3 (ref 3.4–10.8)

## 2023-09-05 ENCOUNTER — TRANSCRIBE ORDERS (OUTPATIENT)
Dept: FAMILY MEDICINE CLINIC | Facility: CLINIC | Age: 88
End: 2023-09-05
Payer: MEDICARE

## 2023-09-05 DIAGNOSIS — Z12.31 SCREENING MAMMOGRAM FOR BREAST CANCER: Primary | ICD-10-CM

## 2023-09-08 ENCOUNTER — TELEPHONE (OUTPATIENT)
Dept: FAMILY MEDICINE CLINIC | Facility: CLINIC | Age: 88
End: 2023-09-08

## 2023-09-08 ENCOUNTER — OFFICE VISIT (OUTPATIENT)
Dept: FAMILY MEDICINE CLINIC | Facility: CLINIC | Age: 88
End: 2023-09-08
Payer: MEDICARE

## 2023-09-08 VITALS
SYSTOLIC BLOOD PRESSURE: 120 MMHG | RESPIRATION RATE: 18 BRPM | WEIGHT: 122.1 LBS | OXYGEN SATURATION: 99 % | HEART RATE: 87 BPM | TEMPERATURE: 97.1 F | HEIGHT: 64 IN | DIASTOLIC BLOOD PRESSURE: 70 MMHG | BODY MASS INDEX: 20.84 KG/M2

## 2023-09-08 DIAGNOSIS — E04.2 NON-TOXIC MULTINODULAR GOITER: ICD-10-CM

## 2023-09-08 DIAGNOSIS — N32.81 OVERACTIVE BLADDER: ICD-10-CM

## 2023-09-08 DIAGNOSIS — F41.1 GENERALIZED ANXIETY DISORDER: ICD-10-CM

## 2023-09-08 DIAGNOSIS — I10 HYPERTENSION, UNSPECIFIED TYPE: ICD-10-CM

## 2023-09-08 DIAGNOSIS — E78.89 ELEVATED HDL: ICD-10-CM

## 2023-09-08 DIAGNOSIS — F41.8 DEPRESSION WITH ANXIETY: ICD-10-CM

## 2023-09-08 DIAGNOSIS — K21.9 GASTROESOPHAGEAL REFLUX DISEASE, UNSPECIFIED WHETHER ESOPHAGITIS PRESENT: ICD-10-CM

## 2023-09-08 DIAGNOSIS — Z23 ENCOUNTER FOR IMMUNIZATION: Primary | ICD-10-CM

## 2023-09-08 DIAGNOSIS — I10 BENIGN ESSENTIAL HYPERTENSION: ICD-10-CM

## 2023-09-08 DIAGNOSIS — I10 BENIGN ESSENTIAL HYPERTENSION: Primary | ICD-10-CM

## 2023-09-08 DIAGNOSIS — R73.01 ELEVATED FASTING GLUCOSE: ICD-10-CM

## 2023-09-08 RX ORDER — LORAZEPAM 1 MG/1
1 TABLET ORAL DAILY PRN
Qty: 90 TABLET | Refills: 0 | Status: SHIPPED | OUTPATIENT
Start: 2023-09-08

## 2023-09-08 RX ORDER — MIRTAZAPINE 15 MG/1
15 TABLET, FILM COATED ORAL NIGHTLY
Qty: 90 TABLET | Refills: 1 | Status: SHIPPED | OUTPATIENT
Start: 2023-09-08

## 2023-09-08 NOTE — PROGRESS NOTES
The ABCs of the Annual Wellness Visit  Subsequent Medicare Wellness Visit    Subjective      Brittny Fay is a 88 y.o. female who presents for a Subsequent Medicare Wellness Visit.    The following portions of the patient's history were reviewed and   updated as appropriate: allergies, current medications, past family history, past medical history, past social history, past surgical history, and problem list.    Compared to one year ago, the patient feels her physical   health is the same.    Compared to one year ago, the patient feels her mental   health is the same.    Recent Hospitalizations:  This patient has had a [facility] admission record on file within the last 365 days.    Current Medical Providers:  Patient Care Team:  Marai G Call MD as PCP - General (Internal Medicine)  Nadya Vigil MD (Inactive) as Consulting Physician (Obstetrics and Gynecology)  Darren Lancaster MD as Consulting Physician (General Surgery)    Outpatient Medications Prior to Visit   Medication Sig Dispense Refill    amLODIPine (NORVASC) 10 MG tablet TAKE 1 TABLET DAILY 90 tablet 1    ascorbic acid (VITAMIN C) 100 MG tablet Take 1 tablet by mouth Daily.      CALCIUM CITRATE PO Take 1 tablet by mouth Daily.      cholecalciferol (VITAMIN D3) 1000 units tablet Take 1 tablet by mouth Daily.      esomeprazole (nexIUM) 40 MG capsule Take 1 capsule by mouth Daily. 90 capsule 0    fluticasone (FLONASE) 50 MCG/ACT nasal spray 2 sprays into each nostril Daily. 1 bottle 2    LORazepam (ATIVAN) 1 MG tablet Take 1 tablet by mouth Daily As Needed for Anxiety. 90 tablet 0    MAGNESIUM PO Take  by mouth.      mirtazapine (Remeron) 15 MG tablet Take 1 tablet by mouth Every Night. 90 tablet 1    Multiple Vitamins-Minerals (WOMENS MULTI VITAMIN & MINERAL) tablet Take 1 tablet by mouth Daily.      mupirocin (BACTROBAN) 2 % ointment Apply  topically to the appropriate area as directed 3 (Three) Times a Day. 30 g 0    vitamin B-12  (CYANOCOBALAMIN) 500 MCG tablet Take 1 tablet by mouth Daily.      doxycycline (MONODOX) 100 MG capsule Take 1 capsule by mouth 2 (Two) Times a Day. 14 capsule 0    solifenacin (VESICARE) 5 MG tablet Take 1 tablet by mouth Daily. (Patient not taking: Reported on 9/8/2023) 90 tablet 1     No facility-administered medications prior to visit.       No opioid medication identified on active medication list. I have reviewed chart for other potential  high risk medication/s and harmful drug interactions in the elderly.        Aspirin is not on active medication list.  Aspirin use is not indicated based on review of current medical condition/s. Risk of harm outweighs potential benefits.  .    Patient Active Problem List   Diagnosis    Depression with anxiety    Benign essential hypertension    Overactive bladder    Degeneration of intervertebral disc of cervical region    Irritable bowel syndrome with diarrhea    Gastroesophageal reflux disease    Seasonal allergic rhinitis    Elevated fasting glucose    Osteopenia    Non-toxic multinodular goiter    Insomnia    Hyperplastic adenomatous polyp of stomach    Hiatal hernia    Onychocryptosis    Acute UTI    Bladder prolapse, female, acquired    Renal cyst, left    History of resection of large bowel    Pelvic floor dysfunction    Intestinal metaplasia of gastric cardia    Abreu's esophagus with dysplasia    SBO (small bowel obstruction)    History of Abreu's esophagus    Atypical chest pain    Absence of bladder continence    Encounter for fitting and adjustment of other specified devices    Female stress incontinence    Generalized abdominal pain    HTN (hypertension)    Incisional hernia    Infectious enteritis    Pain in limb    Pain in right foot    Urge incontinence    Uterovaginal prolapse, incomplete    Vaginitis and vulvovaginitis     Advance Care Planning   Advance Care Planning     Advance Directive is not on file.  ACP discussion was held with the patient  "during this visit. Patient has an advance directive (not in EMR), copy requested.     Objective    Vitals:    09/08/23 0853   BP: 120/70   BP Location: Left arm   Patient Position: Sitting   Cuff Size: Adult   Pulse: 87   Resp: 18   Temp: 97.1 °F (36.2 °C)   TempSrc: Temporal   SpO2: 99%   Weight: 55.4 kg (122 lb 1.6 oz)   Height: 162.6 cm (64.02\")     Estimated body mass index is 20.95 kg/m² as calculated from the following:    Height as of this encounter: 162.6 cm (64.02\").    Weight as of this encounter: 55.4 kg (122 lb 1.6 oz).    BMI is within normal parameters. No other follow-up for BMI required.      Does the patient have evidence of cognitive impairment?   No    Lab Results   Component Value Date    CHLPL 177 08/31/2023    TRIG 55 08/31/2023    HDL 71 (H) 08/31/2023    LDL 95 08/31/2023    VLDL 11 08/31/2023    HGBA1C 5.50 08/31/2023          HEALTH RISK ASSESSMENT    Smoking Status:  Social History     Tobacco Use   Smoking Status Former    Packs/day: 1.50    Years: 20.00    Pack years: 30.00    Types: Cigarettes   Smokeless Tobacco Never   Tobacco Comments    quit 1970     Alcohol Consumption:  Social History     Substance and Sexual Activity   Alcohol Use Yes    Alcohol/week: 5.0 standard drinks    Types: 5 Glasses of wine per week    Comment: socially      Fall Risk Screen:    KENNY Fall Risk Assessment was completed, and patient is at LOW risk for falls.Assessment completed on:3/7/2023    Depression Screening:      3/7/2023    10:56 AM   PHQ-2/PHQ-9 Depression Screening   Little Interest or Pleasure in Doing Things 0-->not at all   Feeling Down, Depressed or Hopeless 0-->not at all   PHQ-9: Brief Depression Severity Measure Score 0       Health Habits and Functional and Cognitive Screening:      3/8/2022     9:39 AM   Functional & Cognitive Status   Do you have difficulty preparing food and eating? No   Do you have difficulty bathing yourself, getting dressed or grooming yourself? No   Do you have " difficulty using the toilet? No   Do you have difficulty moving around from place to place? No   Do you have trouble with steps or getting out of a bed or a chair? No   Current Diet Well Balanced Diet   Dental Exam Up to date   Eye Exam Up to date   Exercise (times per week) 3 times per week   Current Exercises Include Walking;Aerobics   Do you need help using the phone?  No   Are you deaf or do you have serious difficulty hearing?  No   Do you need help to go to places out of walking distance? No   Do you need help shopping? No   Do you need help preparing meals?  No   Do you need help with housework?  No   Do you need help with laundry? No   Do you need help taking your medications? No   Do you need help managing money? No   Do you ever drive or ride in a car without wearing a seat belt? No   Have you felt unusual stress, anger or loneliness in the last month? No   Who do you live with? Community   If you need help, do you have trouble finding someone available to you? No   Have you been bothered in the last four weeks by sexual problems? No   Do you have difficulty concentrating, remembering or making decisions? No       Age-appropriate Screening Schedule:  Refer to the list below for future screening recommendations based on patient's age, sex and/or medical conditions. Orders for these recommended tests are listed in the plan section. The patient has been provided with a written plan.    Health Maintenance   Topic Date Due    ZOSTER VACCINE (2 of 3) 05/16/2012    DXA SCAN  03/08/2021    TDAP/TD VACCINES (4 - Td or Tdap) 06/18/2022    ANNUAL WELLNESS VISIT  03/08/2023    INFLUENZA VACCINE  10/01/2023    COVID-19 Vaccine  Discontinued                  CMS Preventative Services Quick Reference  Risk Factors Identified During Encounter:    Immunizations Discussed/Encouraged: Tdap, Influenza, Prevnar 20 (Pneumococcal 20-valent conjugate), and Shingrix  Dental Screening Recommended  Vision Screening  Recommended    The above risks/problems have been discussed with the patient.  Pertinent information has been shared with the patient in the After Visit Summary.    Diagnoses and all orders for this visit:    1. Generalized anxiety disorder        Follow Up:   Next Medicare Wellness visit to be scheduled in 1 year.      An After Visit Summary and PPPS were made available to the patient.    Patient is exercising.  She is also decreasing her alcohol intake significantly.  Her metabolic panel is normal.  Blood pressure is excellent.  Refills of been provided.  Anxiety is well controlled insomnia is also well controlled.  Vaccines have been discussed eye exam is up-to-date

## 2023-09-08 NOTE — PROGRESS NOTES
"Chief Complaint  Hypertension and Heartburn    Subjective        Brittny Fay presents to [facility]  History of Present Illness  Here for f/u on HTN and GERD.  She is asking about her results of HCT from 4/2023 after a fall.     She also has had voice garbling for years.  EGD was done 2 years ago and the larynx was normal she states.  She had GI doc look at it to reassure her and was normal.  Vocal changes of hoarseness occurs around lunch time and last 30 minutes and resolves on its own.  No ST.  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  EGD was neg for Barretts 2 years ago.  No trouble swallowing.  She does clear her throat often.  No cough.  Has OAB sx but didn't fill vesicare due to an insurance issue   she has urinary frequency and every 4 hours gets up at night to urinate.  Hasn't tried myrbetriq.  She prefers to watch her sx for now b/c of side effects to medications.  At one point she did wear a pessary.  She does have a history of pelvic floor dysfunction and bladder prolapse  HTN controlled on norvasc 10 mg qd.  Anxiety and insomnia on remeron 15 mg qhs and prn ativan 1 mg for anxiety --1/2 tab before bed if needed.  She has decreased her ativan usage tremendously over the past year.  She has cut back on alcohol use as well.  Not drinking currently.    Objective   Vital Signs:  /70 (BP Location: Left arm, Patient Position: Sitting, Cuff Size: Adult)   Pulse 87   Temp 97.1 °F (36.2 °C) (Temporal)   Resp 18   Ht 162.6 cm (64.02\")   Wt 55.4 kg (122 lb 1.6 oz)   SpO2 99%   BMI 20.95 kg/m²   Estimated body mass index is 20.95 kg/m² as calculated from the following:    Height as of this encounter: 162.6 cm (64.02\").    Weight as of this encounter: 55.4 kg (122 lb 1.6 oz).       BMI is within normal parameters. No other follow-up for BMI required.  CT Head Without Contrast (04/29/2023 08:44)     Physical Exam  Vitals and nursing note reviewed. "   Constitutional:       Appearance: Normal appearance. She is well-developed.   HENT:      Head: Normocephalic and atraumatic.      Right Ear: External ear normal.      Left Ear: External ear normal.   Eyes:      Extraocular Movements: Extraocular movements intact.      Conjunctiva/sclera: Conjunctivae normal.   Neck:      Vascular: No carotid bruit.   Cardiovascular:      Rate and Rhythm: Normal rate and regular rhythm.      Heart sounds: Normal heart sounds.      Comments: No bruits  Pulmonary:      Effort: Pulmonary effort is normal. No respiratory distress.      Breath sounds: Normal breath sounds. No stridor. No wheezing, rhonchi or rales.   Chest:      Chest wall: No tenderness.   Abdominal:      General: Bowel sounds are normal. There is no distension.      Palpations: Abdomen is soft. There is no mass.      Tenderness: There is no abdominal tenderness. There is no guarding or rebound.      Hernia: No hernia is present.   Musculoskeletal:      Cervical back: Neck supple.   Lymphadenopathy:      Cervical: No cervical adenopathy.   Skin:     General: Skin is warm.   Neurological:      Mental Status: She is alert and oriented to person, place, and time. Mental status is at baseline.   Psychiatric:         Mood and Affect: Mood normal.         Behavior: Behavior normal.         Thought Content: Thought content normal.         Judgment: Judgment normal.      Result Review :          Hemoglobin A1c (08/31/2023 09:25)  Lipid Panel With LDL / HDL Ratio (08/31/2023 09:25)  Comprehensive Metabolic Panel (08/31/2023 09:25)  CBC & Differential (08/31/2023 09:25)         Assessment and Plan     HTN--continue norvasc 10 mg qd   GERD nexium 40 mg qd.  Gargle voice maybe secondary to being dry and needing to drink more water.   Prediabetes--stable with dietary restrictions and changes.  Anxiety and insomnia-prn ativan 1 mg 1/2 tab prn.  Remeron 15 mg qhs working well for her.  Avoid alcohol.    OAB--wants to avoid medication  at this time due to side effects and cost  Immunization counseling--needs P20--we are out today.  Flu shot next week.  Consider RSV and covid.         Follow Up   No follow-ups on file.  Patient was given instructions and counseling regarding her condition or for health maintenance advice. Please see specific information pulled into the AVS if appropriate.

## 2023-09-11 ENCOUNTER — TELEPHONE (OUTPATIENT)
Dept: FAMILY MEDICINE CLINIC | Facility: CLINIC | Age: 88
End: 2023-09-11
Payer: MEDICARE

## 2023-09-11 NOTE — TELEPHONE ENCOUNTER
Hub staff attempted to follow warm transfer process and was unsuccessful     Caller: Brittny Fay    Relationship to patient: Self    Best call back number: [phone]     Patient is needing: LAB APPOINTMENT PRIOR TO 03/08/24

## 2023-09-26 ENCOUNTER — HOSPITAL ENCOUNTER (OUTPATIENT)
Dept: MAMMOGRAPHY | Facility: HOSPITAL | Age: 88
Discharge: HOME | End: 2023-09-26
Payer: MEDICARE

## 2023-09-26 DIAGNOSIS — Z12.31 SCREENING MAMMOGRAM FOR BREAST CANCER: ICD-10-CM

## 2023-11-13 ENCOUNTER — OFFICE VISIT (OUTPATIENT)
Dept: GASTROENTEROLOGY | Facility: CLINIC | Age: 88
End: 2023-11-13
Payer: MEDICARE

## 2023-11-13 VITALS
TEMPERATURE: 95.9 F | WEIGHT: 122 LBS | BODY MASS INDEX: 20.83 KG/M2 | DIASTOLIC BLOOD PRESSURE: 87 MMHG | HEIGHT: 64 IN | HEART RATE: 82 BPM | OXYGEN SATURATION: 93 % | SYSTOLIC BLOOD PRESSURE: 145 MMHG

## 2023-11-13 DIAGNOSIS — K22.70 BARRETT'S ESOPHAGUS WITHOUT DYSPLASIA: ICD-10-CM

## 2023-11-13 DIAGNOSIS — K21.9 GASTROESOPHAGEAL REFLUX DISEASE, UNSPECIFIED WHETHER ESOPHAGITIS PRESENT: Primary | ICD-10-CM

## 2023-11-13 PROCEDURE — 99213 OFFICE O/P EST LOW 20 MIN: CPT | Performed by: NURSE PRACTITIONER

## 2023-11-13 PROCEDURE — 1159F MED LIST DOCD IN RCRD: CPT | Performed by: NURSE PRACTITIONER

## 2023-11-13 PROCEDURE — 1160F RVW MEDS BY RX/DR IN RCRD: CPT | Performed by: NURSE PRACTITIONER

## 2023-11-13 RX ORDER — SOLIFENACIN SUCCINATE 5 MG/1
TABLET, FILM COATED ORAL
COMMUNITY

## 2023-11-13 RX ORDER — DICLOFENAC SODIUM 75 MG/1
TABLET, DELAYED RELEASE ORAL
COMMUNITY

## 2023-11-13 RX ORDER — ESOMEPRAZOLE MAGNESIUM 40 MG/1
40 CAPSULE, DELAYED RELEASE ORAL DAILY
Qty: 90 CAPSULE | Refills: 3 | Status: SHIPPED | OUTPATIENT
Start: 2023-11-13

## 2023-11-13 NOTE — PROGRESS NOTES
Chief Complaint   Patient presents with    Heartburn       HPI    Brittny Fay is a  88 y.o. female here for a follow up visit for GERD.    This patient follows with myself and Dr. Young.    Past GI medical history of diverticulitis, irritable bowel syndrome, Abreu's esophagus, and small bowel obstruction in 2019 requiring laparotomy.     Patient reports adequate control of GERD symptoms on once daily PPI therapy.  Denies dysphagia, odynophagia, nausea or vomiting.  Appetite is good.  Weight is stable.    She reports some mild constipation.  She has up to 3 bowel movements a day but stools are small volume and firm.  Denies abdominal pain, rectal pain or rectal bleeding.  She has not tried over-the-counter fiber.    Recent LFTs and hemoglobin normal.    EGD dated 3/15/2021: Small hiatal hernia otherwise normal.     Past Medical History:   Diagnosis Date    Abdominal pain, epigastric     Anxiety     Abreu esophagus     not confirmed    Bladder prolapse, female, acquired     Chest pain     Cholelithiasis     Chronic interstitial cystitis     Colon polyps 09/23/2015    Degeneration of cervical intervertebral disc     Depression     Diverticulitis 2008    Diverticulitis of colon     Diverticulosis of colon     Gastritis     GERD (gastroesophageal reflux disease)     Hiatal hernia     Hypertension     IBS (irritable bowel syndrome)     Insomnia     Obstructive sleep apnea     Osteoarthritis     Reflux esophagitis     Solitary thyroid nodule        Past Surgical History:   Procedure Laterality Date    CATARACT EXTRACTION Bilateral     COLON RESECTION N/A 03/04/2008    Sigmoid colon resection with low pelvic anastomosis, right [id number]. Stepehn Kelty, [facility]    COLON RESECTION  2019    small bowel resection    CYSTOCELE REPAIR N/A 06/2017    ENDOSCOPY N/A 4/2/2018    Procedure: ESOPHAGOGASTRODUODENOSCOPY WITH COLD BIOPSIES;  Surgeon: Josiah Weber MD;  Location: [facility] ENDOSCOPY;  Service:  Gastroenterology    ENDOSCOPY N/A 3/15/2021    Procedure: ESOPHAGOGASTRODUODENOSCOPY;  Surgeon: Julio Young MD;  Location: [facility] ENDOSCOPY;  Service: Gastroenterology;  Laterality: N/A;  PRE- BARRETTS ESOPHAGUS  POST- HIATAL HERNIA    ENDOSCOPY AND COLONOSCOPY N/A 04/29/2014    Gastritis: biopsied, esophagitis: biopsied, normal colonoscopy-Dr. Darren Lancaster, [facility]    EXPLORATORY LAPAROTOMY N/A 5/31/2019    Procedure: exploratory laparotomy;  Surgeon: Silvano Alarcon MD;  Location: [facility] MAIN OR;  Service: General    LAPAROSCOPIC CHOLECYSTECTOMY N/A 05/25/2010    Dr. Darren Lancaster, [facility]    SMALL INTESTINE SURGERY N/A 2019    emergent due to SBO    VAGINAL PROLAPSE REPAIR N/A     ALL PELVIC ORGAN PROLAPSE SURGERY    VENTRAL HERNIA REPAIR N/A 11/03/2015    Repair of complex ventral incisional hernia with mesh-Dr. Darren Lancaster [facility]       Scheduled Meds:     Continuous Infusions: No current facility-administered medications for this visit.      PRN Meds:     Allergies   Allergen Reactions    Cephalosporins Swelling    Amitriptyline Other (See Comments)     Other reaction(s): alertness/ hyperness    Bupropion Other (See Comments)     Other reaction(s): insomnia    Pneumococcal Vaccines Dizziness     Other reaction(s): Dizziness       Social History     Socioeconomic History    Marital status:    Tobacco Use    Smoking status: Former     Packs/day: 1.50     Years: 20.00     Additional pack years: 0.00     Total pack years: 30.00     Types: Cigarettes    Smokeless tobacco: Never    Tobacco comments:     quit 1970   Vaping Use    Vaping Use: Never used   Substance and Sexual Activity    Alcohol use: Yes     Alcohol/week: 5.0 standard drinks of alcohol     Types: 5 Glasses of wine per week     Comment: socially     Drug use: No    Sexual activity: Not Currently     Partners: Male       Family History   Problem Relation Age of Onset    Cancer Mother         Breast cancer    Breast cancer Mother      Hypertension Father     Stroke Father     Hypertension Brother     Cancer Maternal Grandmother         breast    Malig Hyperthermia Neg Hx        Review of Systems   HENT:  Negative for trouble swallowing.    Gastrointestinal: Negative.        Vitals:    11/13/23 0930   BP: 145/87   Pulse: 82   Temp: 95.9 °F (35.5 °C)   SpO2: 93%       Physical Exam  Constitutional:       Appearance: She is well-developed.   Abdominal:      General: Bowel sounds are normal. There is no distension.      Palpations: Abdomen is soft. There is no mass.      Tenderness: There is no abdominal tenderness. There is no guarding.      Hernia: No hernia is present.   Skin:     General: Skin is warm and dry.      Capillary Refill: Capillary refill takes less than 2 seconds.   Neurological:      Mental Status: She is alert and oriented to person, place, and time.   Psychiatric:         Behavior: Behavior normal.     Assessment    Diagnoses and all orders for this visit:    1. Gastroesophageal reflux disease, unspecified whether esophagitis present (Primary)    2. Abreu's esophagus without dysplasia    Other orders  -     esomeprazole (nexIUM) 40 MG capsule; Take 1 capsule by mouth Daily.  Dispense: 90 capsule; Refill: 3       Plan    Continue Nexium to manage GERD and prevent progression of Abreu's  Follow an antireflux diet  Start Metamucil 1 tablespoon daily plus Horvath' colon health probiotics once daily to improve bowel pattern  Encouraged adequate hydration  RTC 1 year or sooner if needed         SYDNI Mustafa  [facility]  [address] [place] [address]  Office: [phone]

## 2023-11-28 ENCOUNTER — HOSPITAL ENCOUNTER (OUTPATIENT)
Dept: MAMMOGRAPHY | Facility: HOSPITAL | Age: 88
Discharge: HOME | End: 2023-11-28
Admitting: INTERNAL MEDICINE
Payer: MEDICARE

## 2023-11-28 PROCEDURE — 77067 SCR MAMMO BI INCL CAD: CPT

## 2023-11-28 PROCEDURE — 77063 BREAST TOMOSYNTHESIS BI: CPT

## 2024-01-16 DIAGNOSIS — I10 BENIGN ESSENTIAL HYPERTENSION: ICD-10-CM

## 2024-01-16 RX ORDER — AMLODIPINE BESYLATE 10 MG/1
TABLET ORAL
Qty: 90 TABLET | Refills: 1 | Status: SHIPPED | OUTPATIENT
Start: 2024-01-16

## 2024-01-25 ENCOUNTER — OFFICE VISIT (OUTPATIENT)
Dept: FAMILY MEDICINE CLINIC | Facility: CLINIC | Age: 89
End: 2024-01-25
Payer: MEDICARE

## 2024-01-25 VITALS
SYSTOLIC BLOOD PRESSURE: 128 MMHG | HEIGHT: 64 IN | OXYGEN SATURATION: 96 % | BODY MASS INDEX: 21.21 KG/M2 | RESPIRATION RATE: 18 BRPM | TEMPERATURE: 97.3 F | DIASTOLIC BLOOD PRESSURE: 80 MMHG | HEART RATE: 88 BPM | WEIGHT: 124.2 LBS

## 2024-01-25 DIAGNOSIS — R60.0 LOWER EXTREMITY EDEMA: ICD-10-CM

## 2024-01-25 DIAGNOSIS — R21 RASH: Primary | ICD-10-CM

## 2024-01-25 DIAGNOSIS — I10 BENIGN ESSENTIAL HYPERTENSION: ICD-10-CM

## 2024-01-25 DIAGNOSIS — R42 DIZZINESS: ICD-10-CM

## 2024-01-25 PROCEDURE — 99214 OFFICE O/P EST MOD 30 MIN: CPT | Performed by: NURSE PRACTITIONER

## 2024-01-25 RX ORDER — TRIAMCINOLONE ACETONIDE 1 MG/G
1 OINTMENT TOPICAL 2 TIMES DAILY
Qty: 30 G | Refills: 0 | Status: SHIPPED | OUTPATIENT
Start: 2024-01-25

## 2024-01-25 RX ORDER — AMLODIPINE BESYLATE 10 MG/1
5 TABLET ORAL DAILY
Start: 2024-01-25

## 2024-01-25 NOTE — PROGRESS NOTES
"Chief Complaint  Dizziness, Rash (Leg Left), and Edema (Ankles)    Subjective        Brittny Fay presents to [facility]  History of Present Illness    Brittny Fay is an 89-year-old female who presents today for balance issues and rash.    The patient reports she is doing well. She states her bilateral ankles have been swollen.    She notes she has a rash that seems to be improving, however it has transferred to her other leg. She reports it is itchy. She denies wearing compression socks at home and denies being in contact with anything that she knows of. She notes the rash has been present for months and states she has tried tea tree oil, however it did not help.    She states her main problem is dizziness and lightheadedness. She states it is not the room spinning, however she feels off balance. She notes she is worried about falling. She reports it happens when she goes from sitting to standing. She states it has been happening for a couple of weeks and happens a couple of times a day. She notes she is trying to drink plenty of fluids.    She reports she has had pain in her arm for a long time. She states it hurts when she is lifting. She denies any injury.    She notes she has an appointment with Dr. Call 03/2024.    Objective   Vital Signs:  /80 (BP Location: Right arm, Patient Position: Sitting, Cuff Size: Adult)   Pulse 88   Temp 97.3 °F (36.3 °C) (Temporal)   Resp 18   Ht 162.6 cm (64.02\")   Wt 56.3 kg (124 lb 3.2 oz)   SpO2 96%   BMI 21.31 kg/m²   Estimated body mass index is 21.31 kg/m² as calculated from the following:    Height as of this encounter: 162.6 cm (64.02\").    Weight as of this encounter: 56.3 kg (124 lb 3.2 oz).    BMI is within normal parameters. No other follow-up for BMI required.      Physical Exam  Constitutional:       General: She is not in acute distress.     Appearance: She is well-developed. She is not diaphoretic. "   Cardiovascular:      Rate and Rhythm: Normal rate and regular rhythm.      Heart sounds: Normal heart sounds. No murmur heard.     No friction rub. No gallop.   Pulmonary:      Effort: Pulmonary effort is normal. No respiratory distress.      Breath sounds: Normal breath sounds. No wheezing or rales.   Musculoskeletal:      Cervical back: Neck supple.   Skin:     General: Skin is warm and dry.   Neurological:      Mental Status: She is alert and oriented to person, place, and time.        Result Review :                  Assessment and Plan   Diagnoses and all orders for this visit:    1. Rash (Primary)    2. Lower extremity edema    3. Benign essential hypertension  -     amLODIPine (NORVASC) 10 MG tablet; Take 0.5 tablets by mouth Daily.    4. Dizziness    Other orders  -     triamcinolone (KENALOG) 0.1 % ointment; Apply 1 application  topically to the appropriate area as directed 2 (Two) Times a Day.  Dispense: 30 g; Refill: 0      General health maintenance  - The patient presents today for an acute visit for dizziness, rash and edema.    1. Rash  - The patient will start triamcinolone cream and a prescription order was placed and sent to her pharmacy.    2. Dizziness  - The patient will decrease her amlodipine to 0.5 tablet daily and will follow up with Dr. Call. A refill prescription order was placed and sent to her pharmacy.    3. Bilateral lower extremity edema  - The patient will follow up with Dr. Call as scheduled.    4. Arm pain  - The patient will use Voltaren gel or Aspercreme as needed. If she starts having any chest pain or sweating out of the ordinary, she will go to the emergency room.         Follow Up   No follow-ups on file.  Patient was given instructions and counseling regarding her condition or for health maintenance advice. Please see specific information pulled into the AVS if appropriate.       Transcribed from ambient dictation for SYDNI Moon by Russ King.  01/25/24    09:30 EST    Patient or patient representative verbalized consent to the visit recording.  I have personally performed the services described in this document as transcribed by the above individual, and it is both accurate and complete.   Answers submitted by the patient for this visit:  Other (Submitted on 1/24/2024)  Please describe your symptoms.: Frequent  dizziness or lightheadiness,  swollen ankles, unusual rash on ankle, pain on arm  Have you had these symptoms before?: No  How long have you been having these symptoms?: 1-2 weeks  Please list any medications you are currently taking for this condition.: No meds for this condition  Please describe any probable cause for these symptoms. : I dont know the cause.  Primary Reason for Visit (Submitted on 1/24/2024)  What is the primary reason for your visit?: Other

## 2024-02-13 ENCOUNTER — OFFICE VISIT (OUTPATIENT)
Dept: FAMILY MEDICINE CLINIC | Facility: CLINIC | Age: 89
End: 2024-02-13
Payer: MEDICARE

## 2024-02-13 VITALS
WEIGHT: 121.8 LBS | TEMPERATURE: 96.7 F | HEART RATE: 96 BPM | HEIGHT: 64 IN | BODY MASS INDEX: 20.79 KG/M2 | SYSTOLIC BLOOD PRESSURE: 122 MMHG | DIASTOLIC BLOOD PRESSURE: 86 MMHG | OXYGEN SATURATION: 98 %

## 2024-02-13 DIAGNOSIS — E04.2 NON-TOXIC MULTINODULAR GOITER: ICD-10-CM

## 2024-02-13 DIAGNOSIS — I10 BENIGN ESSENTIAL HYPERTENSION: Primary | ICD-10-CM

## 2024-02-13 DIAGNOSIS — R73.01 ELEVATED FASTING GLUCOSE: ICD-10-CM

## 2024-02-13 DIAGNOSIS — R42 DIZZINESS: ICD-10-CM

## 2024-02-13 DIAGNOSIS — F41.1 GENERALIZED ANXIETY DISORDER: ICD-10-CM

## 2024-02-13 DIAGNOSIS — H61.23 BILATERAL IMPACTED CERUMEN: ICD-10-CM

## 2024-02-13 DIAGNOSIS — R42 DISEQUILIBRIUM: ICD-10-CM

## 2024-02-14 LAB
ALBUMIN SERPL-MCNC: 5 G/DL (ref 3.5–5.2)
ALBUMIN/GLOB SERPL: 2.5 G/DL
ALP SERPL-CCNC: 81 U/L (ref 39–117)
ALT SERPL-CCNC: 12 U/L (ref 1–33)
AST SERPL-CCNC: 17 U/L (ref 1–32)
BASOPHILS # BLD AUTO: 0.05 10*3/MM3 (ref 0–0.2)
BASOPHILS NFR BLD AUTO: 0.7 % (ref 0–1.5)
BILIRUB SERPL-MCNC: 0.4 MG/DL (ref 0–1.2)
BUN SERPL-MCNC: 21 MG/DL (ref 8–23)
BUN/CREAT SERPL: 21.6 (ref 7–25)
CALCIUM SERPL-MCNC: 9.7 MG/DL (ref 8.6–10.5)
CHLORIDE SERPL-SCNC: 103 MMOL/L (ref 98–107)
CO2 SERPL-SCNC: 30.5 MMOL/L (ref 22–29)
CREAT SERPL-MCNC: 0.97 MG/DL (ref 0.57–1)
EGFRCR SERPLBLD CKD-EPI 2021: 56.7 ML/MIN/1.73
EOSINOPHIL # BLD AUTO: 0.17 10*3/MM3 (ref 0–0.4)
EOSINOPHIL NFR BLD AUTO: 2.5 % (ref 0.3–6.2)
ERYTHROCYTE [DISTWIDTH] IN BLOOD BY AUTOMATED COUNT: 12.4 % (ref 12.3–15.4)
GLOBULIN SER CALC-MCNC: 2 GM/DL
GLUCOSE SERPL-MCNC: 108 MG/DL (ref 65–99)
HBA1C MFR BLD: 5.7 % (ref 4.8–5.6)
HCT VFR BLD AUTO: 41.8 % (ref 34–46.6)
HGB BLD-MCNC: 13.6 G/DL (ref 12–15.9)
IMM GRANULOCYTES # BLD AUTO: 0.01 10*3/MM3 (ref 0–0.05)
IMM GRANULOCYTES NFR BLD AUTO: 0.1 % (ref 0–0.5)
LYMPHOCYTES # BLD AUTO: 2.47 10*3/MM3 (ref 0.7–3.1)
LYMPHOCYTES NFR BLD AUTO: 35.8 % (ref 19.6–45.3)
MCH RBC QN AUTO: 28.6 PG (ref 26.6–33)
MCHC RBC AUTO-ENTMCNC: 32.5 G/DL (ref 31.5–35.7)
MCV RBC AUTO: 87.8 FL (ref 79–97)
MONOCYTES # BLD AUTO: 0.68 10*3/MM3 (ref 0.1–0.9)
MONOCYTES NFR BLD AUTO: 9.9 % (ref 5–12)
NEUTROPHILS # BLD AUTO: 3.52 10*3/MM3 (ref 1.7–7)
NEUTROPHILS NFR BLD AUTO: 51 % (ref 42.7–76)
NRBC BLD AUTO-RTO: 0 /100 WBC (ref 0–0.2)
PLATELET # BLD AUTO: 268 10*3/MM3 (ref 140–450)
POTASSIUM SERPL-SCNC: 3.9 MMOL/L (ref 3.5–5.2)
PROT SERPL-MCNC: 7 G/DL (ref 6–8.5)
RBC # BLD AUTO: 4.76 10*6/MM3 (ref 3.77–5.28)
SODIUM SERPL-SCNC: 145 MMOL/L (ref 136–145)
T4 FREE SERPL-MCNC: 1.23 NG/DL (ref 0.93–1.7)
TSH SERPL DL<=0.005 MIU/L-ACNC: 1.14 UIU/ML (ref 0.27–4.2)
VIT B12 SERPL-MCNC: 1786 PG/ML (ref 211–946)
WBC # BLD AUTO: 6.9 10*3/MM3 (ref 3.4–10.8)

## 2024-03-06 ENCOUNTER — HOSPITAL ENCOUNTER (OUTPATIENT)
Dept: CT IMAGING | Facility: HOSPITAL | Age: 89
Discharge: HOME | End: 2024-03-06
Admitting: INTERNAL MEDICINE
Payer: MEDICARE

## 2024-03-06 DIAGNOSIS — R42 DISEQUILIBRIUM: ICD-10-CM

## 2024-03-06 DIAGNOSIS — R42 DIZZINESS: ICD-10-CM

## 2024-03-06 PROCEDURE — 70450 CT HEAD/BRAIN W/O DYE: CPT

## 2024-03-08 ENCOUNTER — OFFICE VISIT (OUTPATIENT)
Dept: FAMILY MEDICINE CLINIC | Facility: CLINIC | Age: 89
End: 2024-03-08
Payer: MEDICARE

## 2024-03-08 VITALS
WEIGHT: 122.7 LBS | TEMPERATURE: 97.7 F | HEIGHT: 64 IN | BODY MASS INDEX: 20.95 KG/M2 | DIASTOLIC BLOOD PRESSURE: 77 MMHG | SYSTOLIC BLOOD PRESSURE: 112 MMHG | OXYGEN SATURATION: 96 % | HEART RATE: 85 BPM

## 2024-03-08 DIAGNOSIS — I10 BENIGN ESSENTIAL HYPERTENSION: Primary | ICD-10-CM

## 2024-03-08 DIAGNOSIS — Z13.6 ENCOUNTER FOR SCREENING FOR VASCULAR DISEASE: ICD-10-CM

## 2024-03-08 DIAGNOSIS — G47.09 OTHER INSOMNIA: ICD-10-CM

## 2024-03-08 DIAGNOSIS — Z87.898 HISTORY OF DIZZINESS: ICD-10-CM

## 2024-03-08 DIAGNOSIS — F41.1 GENERALIZED ANXIETY DISORDER: ICD-10-CM

## 2024-03-08 DIAGNOSIS — F41.8 DEPRESSION WITH ANXIETY: ICD-10-CM

## 2024-03-08 DIAGNOSIS — I99.8 VASCULAR CALCIFICATION: ICD-10-CM

## 2024-03-08 RX ORDER — LORAZEPAM 1 MG/1
1 TABLET ORAL DAILY PRN
Qty: 90 TABLET | Refills: 0 | Status: SHIPPED | OUTPATIENT
Start: 2024-03-08

## 2024-03-08 RX ORDER — MIRTAZAPINE 15 MG/1
15 TABLET, FILM COATED ORAL NIGHTLY
Qty: 90 TABLET | Refills: 3 | Status: SHIPPED | OUTPATIENT
Start: 2024-03-08

## 2024-03-08 NOTE — PROGRESS NOTES
"Chief Complaint  Hypertension (Follow up 6 months)    Subjective        Brittny Fay presents to [facility]  History of Present Illness  PMH of HTN on norvasc 10 mg qd and BP is controlled.  Saw me on 2/13/24 for dizziness.  Labs were normal other than very mild A1c elevation and labs suggestive of dehydration.  HCT showed atrophy and severe vascular calcification and MRI suggested if sx persist.  HTN controlled on norvasc 10 mg qd.  Weight is stable.  No more dizziness since her last visit with me.  She is drinking more water.  Gait is stable.  Feeling well.  Less anxiety since starting remeron a few years ago and it helps her sleep.  Using ativan less often and was able to decrease the dosage.   Needs a refill on both.   H/o Barretts and takes PPI for life per her Gastroenterologist.  B/c it depletes B12, she has started a B12 supplement 1000 mcg qd.      Objective   Vital Signs:  /77 (BP Location: Left arm, Patient Position: Sitting, Cuff Size: Adult)   Pulse 85   Temp 97.7 °F (36.5 °C)   Ht 162.6 cm (64\")   Wt 55.7 kg (122 lb 11.2 oz)   SpO2 96%   BMI 21.06 kg/m²   Estimated body mass index is 21.06 kg/m² as calculated from the following:    Height as of this encounter: 162.6 cm (64\").    Weight as of this encounter: 55.7 kg (122 lb 11.2 oz).       BMI is within normal parameters. No other follow-up for BMI required.  Hemoglobin A1c (02/13/2024 13:48)  Vitamin B12 (02/13/2024 13:48)  T4, Free (02/13/2024 13:48)  TSH (02/13/2024 13:48)  Comprehensive Metabolic Panel (02/13/2024 13:48)  CBC & Differential (02/13/2024 13:48)    Physical Exam  Vitals and nursing note reviewed.   Constitutional:       Appearance: Normal appearance. She is well-developed.   HENT:      Head: Normocephalic and atraumatic.      Right Ear: External ear normal. There is impacted cerumen.      Left Ear: Tympanic membrane, ear canal and external ear normal.   Eyes:      Extraocular Movements: " Extraocular movements intact.      Conjunctiva/sclera: Conjunctivae normal.   Neck:      Vascular: No carotid bruit.   Cardiovascular:      Rate and Rhythm: Normal rate and regular rhythm.      Heart sounds: Normal heart sounds.      Comments: No bruits  Pulmonary:      Effort: Pulmonary effort is normal. No respiratory distress.      Breath sounds: Normal breath sounds. No stridor. No wheezing, rhonchi or rales.   Chest:      Chest wall: No tenderness.   Abdominal:      General: Bowel sounds are normal. There is no distension.      Palpations: Abdomen is soft. There is no mass.      Tenderness: There is no abdominal tenderness. There is no guarding or rebound.      Hernia: No hernia is present.   Musculoskeletal:      Cervical back: Neck supple.   Lymphadenopathy:      Cervical: No cervical adenopathy.   Skin:     General: Skin is warm.   Neurological:      General: No focal deficit present.      Mental Status: She is alert and oriented to person, place, and time. Mental status is at baseline.   Psychiatric:         Mood and Affect: Mood normal.         Behavior: Behavior normal.         Thought Content: Thought content normal.         Judgment: Judgment normal.        Result Review :                     Assessment and Plan     Diagnoses and all orders for this visit:    1. Benign essential hypertension (Primary)    2. Depression with anxiety    3. Other insomnia    4. History of dizziness    5. Generalized anxiety disorder  -     mirtazapine (Remeron) 15 MG tablet; Take 1 tablet by mouth Every Night.  Dispense: 90 tablet; Refill: 3  -     LORazepam (ATIVAN) 1 MG tablet; Take 1 tablet by mouth Daily As Needed for Anxiety.  Dispense: 90 tablet; Refill: 0    6. Encounter for screening for vascular disease    7. Vascular calcification    Patient is doing very well since her last office visit.  Dizziness has completely resolved.  She is drinking more water intentionally.  Blood pressure is well-controlled.  I have  reviewed the head CT which shows atrophy and severe calcification of vascularity.  We have reviewed her risk factors for atherosclerosis.  As of recently, she is no longer drinking alcohol.  She has had normal cholesterol in the past few months.  Her blood pressure is well-controlled on amlodipine 10 mg daily which she will continue.  She is exercising 4 times per week.  Her A1c is in the beginning stages of prediabetes.  She is monitoring her carbohydrate intake.  I see no areas where we can make any additional changes with medication.  She had a normal stress test in 2022.  I tried to order vascular screening but none of the diagnosis were covered .  I also tried to order carotid doppler but again none of the above dx were covered.  She has normal auscultation on carotid exam.  Dizziness has resolved.          Stress Test With Myocardial Perfusion One Day (10/08/2021 17:39)        Follow Up     No follow-ups on file.  Patient was given instructions and counseling regarding her condition or for health maintenance advice. Please see specific information pulled into the AVS if appropriate.

## 2024-04-22 NOTE — ED PROVIDER NOTES
" EMERGENCY DEPARTMENT ENCOUNTER    Room Number:  30/30  Date of encounter:  10/8/2021  PCP: Maria G Call MD  Historian: Patient      HPI:  Chief Complaint: Left arm pain, chest pain  A complete HPI/ROS/PMH/PSH/SH/FH are unobtainable due to: N/A    Context: Brittny Fay is a 86 y.o. female who presents to the ED c/o \"aching\" left arm pain that has been constant for the past few days.  Symptoms are worse with movement.  Late last night/early this morning, she woke up with sharp chest pain on the left side of her chest.  This chest pain is intermittent, severe at times and last for a minute or 2.  There are no aggravating or alleviating factors.  It does not radiate.  The chest pain is severe, it does take her breath but there is no nausea nor diaphoresis.  Currently she does not have any pain in her chest but her left arm does hurt.  She has never had these symptoms before.  She she does go to a senior exercise class, but does not recall any specific injury to her left arm.    She has been vaccinated for COVID-19 and received a booster.    The patient was placed in a mask in triage, hand hygiene was performed before and after my interaction with the patient.  I wore a mask, safety glasses and gloves during my entire interaction with the patient.    PAST MEDICAL HISTORY  Active Ambulatory Problems     Diagnosis Date Noted   • Depression with anxiety 02/01/2016   • Benign essential hypertension 02/01/2016   • Overactive bladder 02/01/2016   • Degeneration of intervertebral disc of cervical region 02/01/2016   • Irritable bowel syndrome with diarrhea 02/01/2016   • Gastroesophageal reflux disease 02/01/2016   • Seasonal allergic rhinitis 02/01/2016   • Elevated fasting glucose 02/01/2016   • Osteopenia 02/01/2016   • Non-toxic multinodular goiter 02/01/2016   • Insomnia 02/01/2016   • Hyperplastic adenomatous polyp of stomach 02/01/2016   • Hiatal hernia 02/01/2016   • Diastolic dysfunction 02/01/2016   • " Onychocryptosis 10/03/2016   • Bladder prolapse, female, acquired 02/06/2017   • Renal cyst, left 02/06/2017   • History of resection of large bowel 11/07/2017   • Family history of digestive disorder 11/07/2017   • Pelvic floor dysfunction 12/05/2017   • Intestinal metaplasia of gastric cardia 04/24/2018   • Abreu's esophagus with dysplasia 05/03/2018   • SBO (small bowel obstruction) (HCC) 05/30/2019   • History of Abreu's esophagus 02/04/2021     Resolved Ambulatory Problems     Diagnosis Date Noted   • Acute cystitis with hematuria 10/03/2016   • Diarrhea 11/07/2017     Past Medical History:   Diagnosis Date   • Abdominal pain, epigastric    • Anxiety    • Abreu esophagus    • Chest pain    • Chronic interstitial cystitis    • Colon polyps 09/23/2015   • Degeneration of cervical intervertebral disc    • Depression    • Diverticulitis 2008   • Diverticulosis of colon    • Gastritis    • GERD (gastroesophageal reflux disease)    • Hypertension    • IBS (irritable bowel syndrome)    • Obstructive sleep apnea    • Osteoarthritis    • Reflux esophagitis    • Solitary thyroid nodule          PAST SURGICAL HISTORY  Past Surgical History:   Procedure Laterality Date   • CATARACT EXTRACTION Bilateral    • COLON RESECTION N/A 03/04/2008    Sigmoid colon resection with low pelvic anastomosis, right [id number]. Stepehn Kelty, [facility]   • COLON RESECTION  2019    small bowel resection   • CYSTOCELE REPAIR N/A 06/2017   • ENDOSCOPY N/A 4/2/2018    Procedure: ESOPHAGOGASTRODUODENOSCOPY WITH COLD BIOPSIES;  Surgeon: Josiah Weber MD;  Location: [facility] ENDOSCOPY;  Service: Gastroenterology   • ENDOSCOPY N/A 3/15/2021    Procedure: ESOPHAGOGASTRODUODENOSCOPY;  Surgeon: Julio Young MD;  Location: [facility] ENDOSCOPY;  Service: Gastroenterology;  Laterality: N/A;  PRE- BARRETTS ESOPHAGUS  POST- HIATAL HERNIA   • ENDOSCOPY AND COLONOSCOPY N/A 04/29/2014    Gastritis: biopsied, esophagitis: biopsied, normal  colonoscopy-Dr. [facility]   • EXPLORATORY LAPAROTOMY N/A 5/31/2019    Procedure: exploratory laparotomy;  Surgeon: Silvano Alarcon MD;  Location: [facility] OR;  Service: General   • LAPAROSCOPIC CHOLECYSTECTOMY N/A 05/25/2010    Dr. [facility]ren [facility]   • SMALL INTESTINE SURGERY N/A 2019    emergent due to SBO   • VAGINAL PROLAPSE REPAIR N/A     ALL PELVIC ORGAN PROLAPSE SURGERY   • VENTRAL HERNIA REPAIR N/A 11/03/2015    Repair of complex ventral incisional hernia with mesh-Dr. Darren [facility]         FAMILY HISTORY  Family History   Problem Relation Age of Onset   • Cancer Mother         Breast cancer   • Breast cancer Mother    • Hypertension Father    • Stroke Father    • Hypertension Brother    • Cancer Maternal Grandmother         breast   • Malig Hyperthermia Neg Hx          SOCIAL HISTORY  Social History     Socioeconomic History   • Marital status:      Spouse name: Not on file   • Number of children: Not on file   • Years of education: Not on file   • Highest education level: Not on file   Tobacco Use   • Smoking status: Former Smoker     Packs/day: 1.50     Years: 20.00     Pack years: 30.00     Types: Cigarettes   • Smokeless tobacco: Former User   • Tobacco comment: quit 1970   Vaping Use   • Vaping Use: Never used   Substance and Sexual Activity   • Alcohol use: Yes     Alcohol/week: 5.0 standard drinks     Types: 5 Glasses of wine per week     Comment: socially    • Drug use: No   • Sexual activity: Defer     Partners: Male         ALLERGIES  Amitriptyline, Bupropion, and Pneumococcal vaccines        REVIEW OF SYSTEMS  Review of Systems   Constitutional: Negative for chills, diaphoresis and fever.   Respiratory: Negative for cough and shortness of breath.    Cardiovascular: Positive for chest pain.   Gastrointestinal: Positive for constipation. Negative for abdominal pain, blood in stool, diarrhea, nausea and vomiting.   Genitourinary: Negative for difficulty urinating,  <<----- Click to Select Surgeon dysuria and flank pain.   Musculoskeletal:        Left upper extremity pain        All systems reviewed and negative except for those discussed in HPI.       PHYSICAL EXAM    I have reviewed the triage vital signs and nursing notes.    ED Triage Vitals   Temp Heart Rate Resp BP SpO2   10/08/21 1214 10/08/21 1214 10/08/21 1214 10/08/21 1228 10/08/21 1214   98.4 °F (36.9 °C) 98 18 143/88 93 %      Temp src Heart Rate Source Patient Position BP Location FiO2 (%)   10/08/21 1214 10/08/21 1214 -- -- --   Tympanic Monitor          Physical Exam   Constitutional: Pt. is oriented to person, place, and time and well-developed, well-nourished, and in no distress.   HENT: Normocephalic and atraumatic. Oropharynx moist/nonerythematous.  Neck: Normal range of motion. Neck supple. No JVD present.   Cardiovascular: Normal rate, regular rhythm and normal heart sounds. Exam reveals no gallop and no friction rub.   No murmur heard.  Pulmonary/Chest: Effort normal and breath sounds normal. No stridor. No respiratory distress. No wheezes, no rales.   Abdominal: Soft. Bowel sounds are normal. No distension. There is no tenderness. There is no rebound and no guarding.   Musculoskeletal: Normal range of motion.  Trace lower extremity edema, no tenderness or deformity.   Neurological: Pt. is alert and oriented to person, place, and time.  She has no focal neurologic deficits  Skin: Skin is warm and dry. No rash noted. Pt. is not diaphoretic. No erythema.   Psychiatric: Mood, affect and judgment normal.  She is pleasant and cooperative.  Nursing note and vitals reviewed.        LAB RESULTS  Recent Results (from the past 24 hour(s))   ECG 12 Lead    Collection Time: 10/08/21 12:21 PM   Result Value Ref Range    QT Interval 390 ms   Comprehensive Metabolic Panel    Collection Time: 10/08/21  1:08 PM    Specimen: Blood   Result Value Ref Range    Glucose 108 (H) 65 - 99 mg/dL    BUN 15 8 - 23 mg/dL    Creatinine 0.71 0.57 - 1.00 mg/dL     D'Amico, Randy (Attending) Sodium 143 136 - 145 mmol/L    Potassium 4.1 3.5 - 5.2 mmol/L    Chloride 104 98 - 107 mmol/L    CO2 28.3 22.0 - 29.0 mmol/L    Calcium 9.9 8.6 - 10.5 mg/dL    Total Protein 7.2 6.0 - 8.5 g/dL    Albumin 4.90 3.50 - 5.20 g/dL    ALT (SGPT) 13 1 - 33 U/L    AST (SGOT) 17 1 - 32 U/L    Alkaline Phosphatase 71 39 - 117 U/L    Total Bilirubin 0.3 0.0 - 1.2 mg/dL    eGFR Non African Amer 78 >60 mL/min/1.73    Globulin 2.3 gm/dL    A/G Ratio 2.1 g/dL    BUN/Creatinine Ratio 21.1 7.0 - 25.0    Anion Gap 10.7 5.0 - 15.0 mmol/L   Lipase    Collection Time: 10/08/21  1:08 PM    Specimen: Blood   Result Value Ref Range    Lipase 21 13 - 60 U/L   BNP    Collection Time: 10/08/21  1:08 PM    Specimen: Blood   Result Value Ref Range    proBNP 76.7 0.0-1,800.0 pg/mL   Troponin    Collection Time: 10/08/21  1:08 PM    Specimen: Blood   Result Value Ref Range    Troponin T <0.010 0.000 - 0.030 ng/mL   CBC Auto Differential    Collection Time: 10/08/21  1:08 PM    Specimen: Blood   Result Value Ref Range    WBC 5.79 3.40 - 10.80 10*3/mm3    RBC 4.73 3.77 - 5.28 10*6/mm3    Hemoglobin 13.5 12.0 - 15.9 g/dL    Hematocrit 41.5 34.0 - 46.6 %    MCV 87.7 79.0 - 97.0 fL    MCH 28.5 26.6 - 33.0 pg    MCHC 32.5 31.5 - 35.7 g/dL    RDW 12.3 12.3 - 15.4 %    RDW-SD 38.9 37.0 - 54.0 fl    MPV 9.8 6.0 - 12.0 fL    Platelets 227 140 - 450 10*3/mm3    Neutrophil % 55.1 42.7 - 76.0 %    Lymphocyte % 31.3 19.6 - 45.3 %    Monocyte % 9.8 5.0 - 12.0 %    Eosinophil % 3.1 0.3 - 6.2 %    Basophil % 0.5 0.0 - 1.5 %    Immature Grans % 0.2 0.0 - 0.5 %    Neutrophils, Absolute 3.19 1.70 - 7.00 10*3/mm3    Lymphocytes, Absolute 1.81 0.70 - 3.10 10*3/mm3    Monocytes, Absolute 0.57 0.10 - 0.90 10*3/mm3    Eosinophils, Absolute 0.18 0.00 - 0.40 10*3/mm3    Basophils, Absolute 0.03 0.00 - 0.20 10*3/mm3    Immature Grans, Absolute 0.01 0.00 - 0.05 10*3/mm3    nRBC 0.0 0.0 - 0.2 /100 WBC   COVID-19,[facility]ESS[facility] IN-HOUSE CEPHEID/ADDY NP SWAB IN TRANSPORT MEDIA 8-12 HR  TAT - Swab, Nasopharynx    Collection Time: 10/08/21  1:08 PM    Specimen: Nasopharynx; Swab   Result Value Ref Range    COVID19 Not Detected Not Detected - Ref. Range       Ordered the above labs and independently reviewed the results.        RADIOLOGY  XR Chest 1 View    Result Date: 10/8/2021  XR CHEST 1 VW-  HISTORY: Female who is 86 years-old,  chest pain  TECHNIQUE: Frontal views of the chest  COMPARISON: None available  FINDINGS: Heart size is borderline. Aorta is tortuous. Pulmonary vasculature is unremarkable. No focal pulmonary consolidation, pleural effusion, or pneumothorax. No acute osseous process.      No focal pulmonary consolidation. Borderline heart size. Tortuous aorta. Follow-up as clinically indicated.  This report was finalized on 10/8/2021 1:22 PM by Dr. Lopez Tracey M.D.        I ordered the above noted radiological studies. Reviewed by me and discussed with radiologist.  See dictation for official radiology interpretation.      PROCEDURES    Procedures      MEDICATIONS GIVEN IN ER    Medications   sodium chloride 0.9 % flush 10 mL (has no administration in time range)   aspirin tablet 325 mg (325 mg Oral Given 10/8/21 1313)   ketorolac (TORADOL) injection 15 mg (15 mg Intravenous Given 10/8/21 1427)         PROGRESS, DATA ANALYSIS, CONSULTS, AND MEDICAL DECISION MAKING    Any/all labs have been independently reviewed by me.  Any/all radiology studies have been reviewed by me and discussed with radiologist dictating the report.   EKG's independently viewed and interpreted by me.  Discussion below represents my analysis of pertinent findings related to patient's condition, differential diagnosis, treatment plan and final disposition.      ED Course as of Oct 08 1441   Fri Oct 08, 2021   1241 Differential diagnosis includes but is not limited to: Pulmonary embolism, aortic dissection, acute coronary syndrome/STEMI, pneumonia/CHF/COPD exacerbation, pneumothorax, pleurisy, bronchitis,  gastroesophageal reflux, referred pain, traumatic injury/rib fractures, etc.    [WC]   1241 Cardiac monitor revealed normal sinus rhythm as interpreted by me.  Cardiac monitoring was ordered secondary to the patient's history of chest pain and to monitor the patient for dysrhythmia.    [WC]   1241 EKG performed at 1221 and interpreted by me shows normal sinus rhythm with a heart of 77 bpm.  Parables, QRS complexes and ST-T segments are unremarkable.  There are no prior EKGs available for comparison.    [WC]   1326 Chest x-ray independently viewed by me and discussed with Dr. Tracey (radiology)-there are no acute processes identified.  For official interpretation, see dictated report.    [WC]   1355 Troponin T: <0.010 [WC]   1355 proBNP: 76.7 [WC]   1355 Normal   Lipase: 21 [WC]   1355 CMP is normal.    [WC]   1409 EKG and labs discussed with the patient.  Her pain is very atypical, however she is elderly and does have a few risk factors for heart disease.  She will be placed in observation status for further evaluation/work-up and cardiology consult.    [WC]   1418 COVID19: Not Detected [WC]   1419 Case discussed with Dr. Nitin Argueta (cardiology (-he agrees to see the patient in consultation.    [WC]      ED Course User Index  [WC] Milo Garcia MD       AS OF 14:41 EDT VITALS:    BP - 134/85  HR - 71  TEMP - 98.4 °F (36.9 °C) (Tympanic)  02 SATS - 97%        DIAGNOSIS  Final diagnoses:   Atypical chest pain         DISPOSITION  Observation unit           Milo Garcia MD  10/08/21 1410       Milo Garcia MD  10/08/21 1441

## 2024-04-25 ENCOUNTER — OFFICE VISIT (OUTPATIENT)
Dept: FAMILY MEDICINE CLINIC | Facility: CLINIC | Age: 89
End: 2024-04-25
Payer: MEDICARE

## 2024-04-25 ENCOUNTER — HOSPITAL ENCOUNTER (OUTPATIENT)
Dept: GENERAL RADIOLOGY | Facility: HOSPITAL | Age: 89
Discharge: HOME | End: 2024-04-25
Payer: MEDICARE

## 2024-04-25 VITALS
HEIGHT: 64 IN | HEART RATE: 76 BPM | BODY MASS INDEX: 20.66 KG/M2 | RESPIRATION RATE: 18 BRPM | DIASTOLIC BLOOD PRESSURE: 80 MMHG | OXYGEN SATURATION: 96 % | WEIGHT: 121 LBS | SYSTOLIC BLOOD PRESSURE: 128 MMHG

## 2024-04-25 DIAGNOSIS — M79.671 FOOT PAIN, RIGHT: Primary | ICD-10-CM

## 2024-04-25 DIAGNOSIS — G89.29 CHRONIC PAIN OF RIGHT ANKLE: ICD-10-CM

## 2024-04-25 DIAGNOSIS — M25.571 CHRONIC PAIN OF RIGHT ANKLE: ICD-10-CM

## 2024-04-25 PROCEDURE — 73610 X-RAY EXAM OF ANKLE: CPT

## 2024-04-25 PROCEDURE — 73630 X-RAY EXAM OF FOOT: CPT

## 2024-04-25 PROCEDURE — 99213 OFFICE O/P EST LOW 20 MIN: CPT | Performed by: NURSE PRACTITIONER

## 2024-04-25 NOTE — PROGRESS NOTES
"Chief Complaint  Foot Swelling (Rt foot painful and swollen)    Subjective        Brittny Fay presents to [facility]  History of Present Illness  Pleasant patient complains of chronic and mid right pain of her anterior lateral ankle and proximal foot dorsum no injury, more, and seems to be bothering her more over the last couple weeks,  Increases when walking,  Nothing radiates from her back no known injury, no history of gout or other autoimmune arthritis  She has other related to foot problems, hallux valgus  She see podiatry in the past and had bad experiences she prefers not to see foot doctor at this time and does not wish to have any intervention for this.      Objective   Vital Signs:  /80   Pulse 76   Resp 18   Ht 162.6 cm (64.02\")   Wt 54.9 kg (121 lb)   SpO2 96%   BMI 20.76 kg/m²   Estimated body mass index is 20.76 kg/m² as calculated from the following:    Height as of this encounter: 162.6 cm (64.02\").    Weight as of this encounter: 54.9 kg (121 lb).    BMI is within normal parameters. No other follow-up for BMI required.      Physical Exam  Constitutional:       General: She is not in acute distress.     Appearance: Normal appearance. She is not ill-appearing, toxic-appearing or diaphoretic.   Eyes:      Conjunctiva/sclera: Conjunctivae normal.      Pupils: Pupils are equal, round, and reactive to light.   Pulmonary:      Effort: Pulmonary effort is normal. No respiratory distress.   Musculoskeletal:         General: Tenderness and deformity present. No swelling or signs of injury.      Right lower leg: No edema.      Left lower leg: No edema.      Comments: Hallux valgus bilateral,  No open areas,  Mild tenderness dorsum proximal lateral tarsal bone region, and ankle, anterior ankle ligaments  Without notable swelling, no crepitus range of motion normal with pain,  No individual metatarsal tenderness or deformity,  Metatarsal joints nontender    Walks " with a mild limp   Skin:     General: Skin is warm and dry.   Neurological:      General: No focal deficit present.      Mental Status: She is alert. Mental status is at baseline.   Psychiatric:         Mood and Affect: Mood normal.         Thought Content: Thought content normal.         Judgment: Judgment normal.        Result Review :                Assessment and Plan   Diagnoses and all orders for this visit:    1. Foot pain, right (Primary)  -     XR Ankle 3+ View Right  -     XR Foot 3+ View Right    2. Chronic pain of right ankle  -     XR Ankle 3+ View Right  -     XR Foot 3+ View Right    Other orders  -     Diclofenac Sodium (VOLTAREN) 1 % gel gel; Apply 4 g topically to the appropriate area as directed 3 (Three) Times a Day. Rt ankle foot until better  Dispense: 350 g; Refill: 1             Follow Up   No follow-ups on file.  Patient was given instructions and counseling regarding her condition or for health maintenance advice. Please see specific information pulled into the AVS if appropriate.     Patient Instructions   Discharge instructions suspicious of some arthritic changes and some soft tissue inflammation, tendinitis ligament inflammation,  Of your foot and ankle bones,    Long-term management some gentle range of motion daily, stretching of your ligaments think of the directions your ankle goes, and your foot and gently stretch each direction daily something fun while watching TV etc.    Use an anti-inflammatory for several months and see if this helps as well as needed but continue to do so when needed 2-3 times a day but you need to give it 3 to 6 weeks before evaluating effectiveness  As it is an anti-inflammatory more than a pain reliever    Consider power step [place] inserts  Removed inserts out of your shoe in place 1 better supporting her arch this is just good footcare by most podiatrist recommended x-rays today    Call Monday for results      If needed an Ace wrap, for extra support,  as needed when walking etc.    Start gentle walking but walking okay as tolerated if not improving over the next 4 to 8 weeks, see orthopedist I would recommend Dr. Myles or Dr. Garcia call for referral    But update your condition in about 4 weeks please diclofenac gel and Voltaren are the same    You can use which you have already       Falls precaution especially now,  Consider physical therapy referral for orthopedic, especially if not improving

## 2024-04-25 NOTE — PATIENT INSTRUCTIONS
Discharge instructions suspicious of some arthritic changes and some soft tissue inflammation, tendinitis ligament inflammation,  Of your foot and ankle bones,    Long-term management some gentle range of motion daily, stretching of your ligaments think of the directions your ankle goes, and your foot and gently stretch each direction daily something fun while watching TV etc.    Use an anti-inflammatory for several months and see if this helps as well as needed but continue to do so when needed 2-3 times a day but you need to give it 3 to 6 weeks before evaluating effectiveness  As it is an anti-inflammatory more than a pain reliever    Consider power step [place] inserts  Removed inserts out of your shoe in place 1 better supporting her arch this is just good footcare by most podiatrist recommended x-rays today    Call Monday for results      If needed an Ace wrap, for extra support, as needed when walking etc.    Start gentle walking but walking okay as tolerated if not improving over the next 4 to 8 weeks, see orthopedist I would recommend Dr. Myles or Dr. Garcia call for referral    But update your condition in about 4 weeks please diclofenac gel and Voltaren are the same    You can use which you have already

## 2024-07-07 DIAGNOSIS — I10 BENIGN ESSENTIAL HYPERTENSION: ICD-10-CM

## 2024-07-09 RX ORDER — AMLODIPINE BESYLATE 10 MG/1
10 TABLET ORAL DAILY
Qty: 90 TABLET | Refills: 1 | Status: SHIPPED | OUTPATIENT
Start: 2024-07-09

## 2024-09-11 ENCOUNTER — OFFICE VISIT (OUTPATIENT)
Dept: FAMILY MEDICINE CLINIC | Facility: CLINIC | Age: 89
End: 2024-09-11
Payer: MEDICARE

## 2024-09-11 VITALS
RESPIRATION RATE: 17 BRPM | WEIGHT: 125 LBS | DIASTOLIC BLOOD PRESSURE: 60 MMHG | HEART RATE: 77 BPM | SYSTOLIC BLOOD PRESSURE: 110 MMHG | OXYGEN SATURATION: 95 % | HEIGHT: 64 IN | BODY MASS INDEX: 21.34 KG/M2

## 2024-09-11 DIAGNOSIS — M62.89 PELVIC FLOOR DYSFUNCTION: ICD-10-CM

## 2024-09-11 DIAGNOSIS — G47.09 OTHER INSOMNIA: ICD-10-CM

## 2024-09-11 DIAGNOSIS — N32.81 OVERACTIVE BLADDER: ICD-10-CM

## 2024-09-11 DIAGNOSIS — F41.8 DEPRESSION WITH ANXIETY: ICD-10-CM

## 2024-09-11 DIAGNOSIS — N39.44 URINARY INCONTINENCE, NOCTURNAL ENURESIS: Primary | ICD-10-CM

## 2024-09-11 DIAGNOSIS — I10 BENIGN ESSENTIAL HYPERTENSION: ICD-10-CM

## 2024-09-11 DIAGNOSIS — R73.01 ELEVATED FASTING GLUCOSE: ICD-10-CM

## 2024-09-11 DIAGNOSIS — F41.1 GENERALIZED ANXIETY DISORDER: ICD-10-CM

## 2024-09-11 PROCEDURE — G0439 PPPS, SUBSEQ VISIT: HCPCS | Performed by: INTERNAL MEDICINE

## 2024-09-11 PROCEDURE — 1160F RVW MEDS BY RX/DR IN RCRD: CPT | Performed by: INTERNAL MEDICINE

## 2024-09-11 PROCEDURE — 1159F MED LIST DOCD IN RCRD: CPT | Performed by: INTERNAL MEDICINE

## 2024-09-11 PROCEDURE — 99213 OFFICE O/P EST LOW 20 MIN: CPT | Performed by: INTERNAL MEDICINE

## 2024-09-11 PROCEDURE — 1126F AMNT PAIN NOTED NONE PRSNT: CPT | Performed by: INTERNAL MEDICINE

## 2024-09-11 RX ORDER — LORAZEPAM 0.5 MG/1
TABLET ORAL
Qty: 90 TABLET | Refills: 0 | Status: SHIPPED | OUTPATIENT
Start: 2024-09-11

## 2024-09-11 RX ORDER — LORAZEPAM 1 MG/1
1 TABLET ORAL DAILY PRN
Qty: 90 TABLET | Refills: 0 | Status: CANCELLED | OUTPATIENT
Start: 2024-09-11

## 2024-09-11 NOTE — PROGRESS NOTES
Subjective   The ABCs of the Annual Wellness Visit  Medicare Wellness Visit      Brittny Fay is a 89 y.o. patient who presents for a Medicare Wellness Visit.    The following portions of the patient's history were reviewed and   updated as appropriate: allergies, current medications, past family history, past medical history, past social history, past surgical history, and problem list.    Compared to one year ago, the patient's physical   health is the same.  Compared to one year ago, the patient's mental   health is worse.    Recent Hospitalizations:  She was not admitted to the hospital during the last year.     Current Medical Providers:  Patient Care Team:  Maria G Call MD as PCP - General (Internal Medicine)  Nadya Vigil MD (Inactive) as Consulting Physician (Obstetrics and Gynecology)  Darren Lancaster MD as Consulting Physician (General Surgery)    Outpatient Medications Prior to Visit   Medication Sig Dispense Refill    amLODIPine (NORVASC) 10 MG tablet TAKE 1 TABLET DAILY 90 tablet 1    ascorbic acid (VITAMIN C) 100 MG tablet Take 1 tablet by mouth Daily.      CALCIUM CITRATE PO Take 1 tablet by mouth Daily.      cholecalciferol (VITAMIN D3) 1000 units tablet Take 1 tablet by mouth Daily.      Diclofenac Sodium (VOLTAREN) 1 % gel gel Apply 4 g topically to the appropriate area as directed 3 (Three) Times a Day. Rt ankle foot until better 350 g 1    esomeprazole (nexIUM) 40 MG capsule Take 1 capsule by mouth Daily. 90 capsule 3    fluticasone (FLONASE) 50 MCG/ACT nasal spray 2 sprays into each nostril Daily. 1 bottle 2    LORazepam (ATIVAN) 1 MG tablet Take 1 tablet by mouth Daily As Needed for Anxiety. 90 tablet 0    MAGNESIUM PO Take  by mouth.      mirtazapine (Remeron) 15 MG tablet Take 1 tablet by mouth Every Night. 90 tablet 3    Multiple Vitamins-Minerals (WOMENS MULTI VITAMIN & MINERAL) tablet Take 1 tablet by mouth Daily.      mupirocin (BACTROBAN) 2 % ointment Apply  topically to the  appropriate area as directed 3 (Three) Times a Day. 30 g 0    triamcinolone (KENALOG) 0.1 % ointment Apply 1 application  topically to the appropriate area as directed 2 (Two) Times a Day. 30 g 0    vitamin B-12 (CYANOCOBALAMIN) 500 MCG tablet Take 1 tablet by mouth Daily.       No facility-administered medications prior to visit.     No opioid medication identified on active medication list. I have reviewed chart for other potential  high risk medication/s and harmful drug interactions in the elderly.      Aspirin is not on active medication list.  Aspirin use is not indicated based on review of current medical condition/s. Risk of harm outweighs potential benefits.  .    Patient Active Problem List   Diagnosis    Depression with anxiety    Benign essential hypertension    Overactive bladder    Degeneration of intervertebral disc of cervical region    Irritable bowel syndrome with diarrhea    Gastroesophageal reflux disease    Seasonal allergic rhinitis    Elevated fasting glucose    Osteopenia    Non-toxic multinodular goiter    Insomnia    Hyperplastic adenomatous polyp of stomach    Hiatal hernia    Onychocryptosis    Acute UTI    Bladder prolapse, female, acquired    Renal cyst, left    History of resection of large bowel    Pelvic floor dysfunction    Intestinal metaplasia of gastric cardia    Abreu's esophagus with dysplasia    SBO (small bowel obstruction)    History of Abreu's esophagus    Atypical chest pain    Absence of bladder continence    Encounter for fitting and adjustment of other specified devices    Female stress incontinence    Generalized abdominal pain    HTN (hypertension)    Incisional hernia    Infectious enteritis    Pain in limb    Pain in right foot    Urge incontinence    Uterovaginal prolapse, incomplete    Vaginitis and vulvovaginitis    History of dizziness     Advance Care Planning Advance Directive is not on file.  ACP discussion was held with the patient during this visit.  "Patient has an advance directive (not in EMR), copy requested.            Objective   Vitals:    24 0918   BP: 110/60   Pulse: 77   Resp: 17   SpO2: 95%   Weight: 56.7 kg (125 lb)   Height: 162.6 cm (64.02\")   PainSc: 0-No pain       Estimated body mass index is 21.44 kg/m² as calculated from the following:    Height as of this encounter: 162.6 cm (64.02\").    Weight as of this encounter: 56.7 kg (125 lb).    BMI is within normal parameters. No other follow-up for BMI required.       Does the patient have evidence of cognitive impairment? No  Lab Results   Component Value Date    CHLPL 172 2024    TRIG 64 2024    HDL 69 (H) 2024    LDL 91 2024    VLDL 12 2024    HGBA1C 5.40 2024                                                                                               Health  Risk Assessment    Smoking Status:  Social History     Tobacco Use   Smoking Status Former    Current packs/day: 0.00    Average packs/day: 0.5 packs/day for 20.0 years (10.0 ttl pk-yrs)    Types: Cigarettes    Start date: 1999    Quit date: 2019    Years since quittin.1    Passive exposure: Past   Smokeless Tobacco Never   Tobacco Comments    quit      Alcohol Consumption:  Social History     Substance and Sexual Activity   Alcohol Use Yes    Alcohol/week: 2.0 standard drinks of alcohol    Types: 2 Glasses of wine per week    Comment: socially        Fall Risk Screen  STEADI Fall Risk Assessment was completed, and patient is at LOW risk for falls.Assessment completed on:2024    Depression Screenin/13/2024    12:52 PM   PHQ-2/PHQ-9 Depression Screening   Little Interest or Pleasure in Doing Things 0-->not at all   Feeling Down, Depressed or Hopeless 0-->not at all   PHQ-9: Brief Depression Severity Measure Score 0     Health Habits and Functional and Cognitive Screening:      3/8/2022     9:39 AM   Functional & Cognitive Status   Do you have difficulty preparing food " and eating? No   Do you have difficulty bathing yourself, getting dressed or grooming yourself? No   Do you have difficulty using the toilet? No   Do you have difficulty moving around from place to place? No   Do you have trouble with steps or getting out of a bed or a chair? No   Current Diet Well Balanced Diet   Dental Exam Up to date   Eye Exam Up to date   Exercise (times per week) 3 times per week   Current Exercises Include Walking;Aerobics   Do you need help using the phone?  No   Are you deaf or do you have serious difficulty hearing?  No   Do you need help to go to places out of walking distance? No   Do you need help shopping? No   Do you need help preparing meals?  No   Do you need help with housework?  No   Do you need help with laundry? No   Do you need help taking your medications? No   Do you need help managing money? No   Do you ever drive or ride in a car without wearing a seat belt? No   Have you felt unusual stress, anger or loneliness in the last month? No   Who do you live with? Community   If you need help, do you have trouble finding someone available to you? No   Have you been bothered in the last four weeks by sexual problems? No   Do you have difficulty concentrating, remembering or making decisions? No           Age-appropriate Screening Schedule:  Refer to the list below for future screening recommendations based on patient's age, sex and/or medical conditions. Orders for these recommended tests are listed in the plan section. The patient has been provided with a written plan.    Health Maintenance List  Health Maintenance   Topic Date Due    DXA SCAN  03/08/2021    TDAP/TD VACCINES (4 - Td or Tdap) 06/18/2022    ANNUAL WELLNESS VISIT  09/08/2024    ZOSTER VACCINE (2 of 3) 12/30/2024 (Originally 5/16/2012)    MAMMOGRAM  11/28/2025    RSV Vaccine - Adults  Completed    INFLUENZA VACCINE  Completed    Pneumococcal Vaccine 65+  Completed    COVID-19 Vaccine  Discontinued                                                                                                                                                 CMS Preventative Services Quick Reference  Risk Factors Identified During Encounter  Alcohol Misuse: Patient encouraged to limit alcohol use to no more than 1 standard alcoholic beverage per day. (12 ounce beer, 6 ounce wine, one shot liquor)  Immunizations Discussed/Encouraged: RSV (Respiratory Syncytial Virus)  Dental Screening Recommended  Vision Screening Recommended    The above risks/problems have been discussed with the patient.  Pertinent information has been shared with the patient in the After Visit Summary.  An After Visit Summary and PPPS were made available to the patient.    Follow Up:   Next Medicare Wellness visit to be scheduled in 1 year.     Assessment & Plan  Generalized anxiety disorder  Psychological condition is stable.  Medication changes per orders.  Psychological condition  will be reassessed in 6 months.  Urinary incontinence, nocturnal enuresis               Follow Up:   No follow-ups on file.

## 2024-09-11 NOTE — PROGRESS NOTES
Chief Complaint  Hypertension (6 mon f/u)    Subjective        Brittny Fay presents to [facility]    History of Present Illness  The patient is here for follow-up on hypertension, GERD, anxiety, and insomnia.    She takes amlodipine 10 mg daily for her hypertension. She maintains an exercise routine of four times a week and includes walking in her regimen.    For GERD, she takes Nexium 40 mg daily.    She takes Remeron 15 mg at night for anxiety and insomnia. She also has lorazepam 1 mg tablets on hand for anxiety. Her dosage and frequency of lorazepam use have dramatically decreased since starting Remeron. She uses lorazepam nightly, taking it half an hour before bedtime. Without it, she sleeps for only six hours, typically from 10:00 PM to 4:00 AM, and struggles to fall back asleep. With both Remeron and lorazepam, she achieves seven to eight hours of sleep. Insufficient sleep leaves her feeling tired during the day, but she is unable to nap. She requires a refill of her lorazepam prescription.    She has been experiencing nighttime incontinence, which is distressing for her. She is also concerned about the volume of urine produced. She uses a heavy pad at night, which is often soaked by morning. She has not had a recent urinalysis. She experiences no daytime incontinence. She has had an overactive bladder for several years, which can lead to minor leakage if she does not reach the bathroom in time. She usually wears a pad and has recently started using nighttime pads. She is unsure why she is experiencing nighttime incontinence. She does not wake up when she urinates at night. She has reduced her alcohol consumption, now only drinking two glasses of wine on weekends, compared to almost every night previously. She tried medication for her overactive bladder years ago, but not recently.    She does not have glaucoma, and her last ophthalmologist visit in November 2023 showed no  "issues. However, she has noticed a slight change in her vision and has started wearing glasses again. She occasionally experiences memory issues, which she attributes to her age. She believes her overall health is the same as last year, but her mental health may have slightly deteriorated.    She has not been hospitalized this year and does not take daily aspirin. She has a living will and has not experienced any falls this year. She does not have hearing problems or use hearing aids. She visits her dentist annually. She received her influenza and COVID-19 vaccines yesterday. She consumes little caffeine and tries to increase her water intake, particularly in the morning and early afternoon, to avoid frequent urination. She wonders if her current bladder issues could be related to a bladder prolapse she experienced seven to eight years ago, which was treated with surgery .  Hemoglobin A1c (09/06/2024 08:41)  Lipid Panel With LDL/HDL Ratio (09/06/2024 08:41)  Comprehensive metabolic panel (09/06/2024 08:41)  CBC w AUTO Differential (09/06/2024 08:41)     Objective   Vital Signs:  /60   Pulse 77   Resp 17   Ht 162.6 cm (64.02\")   Wt 56.7 kg (125 lb)   SpO2 95%   BMI 21.44 kg/m²   Estimated body mass index is 21.44 kg/m² as calculated from the following:    Height as of this encounter: 162.6 cm (64.02\").    Weight as of this encounter: 56.7 kg (125 lb).    BMI is within normal parameters. No other follow-up for BMI required.      Physical Exam  Vitals and nursing note reviewed.   Constitutional:       Appearance: Normal appearance. She is well-developed.   HENT:      Head: Normocephalic and atraumatic.      Right Ear: External ear normal.      Left Ear: External ear normal.   Eyes:      Extraocular Movements: Extraocular movements intact.      Conjunctiva/sclera: Conjunctivae normal.   Neck:      Vascular: No carotid bruit.   Cardiovascular:      Rate and Rhythm: Normal rate and regular rhythm.      Heart " sounds: Normal heart sounds.      Comments: No bruits  Pulmonary:      Effort: Pulmonary effort is normal. No respiratory distress.      Breath sounds: Normal breath sounds. No stridor. No wheezing, rhonchi or rales.   Chest:      Chest wall: No tenderness.   Abdominal:      General: Bowel sounds are normal. There is no distension.      Palpations: Abdomen is soft. There is no mass.      Tenderness: There is no abdominal tenderness. There is no guarding or rebound.      Hernia: No hernia is present.   Musculoskeletal:      Cervical back: Neck supple.      Right lower leg: No edema.      Left lower leg: No edema.   Lymphadenopathy:      Cervical: No cervical adenopathy.   Skin:     General: Skin is warm.   Neurological:      General: No focal deficit present.      Mental Status: She is alert and oriented to person, place, and time. Mental status is at baseline.   Psychiatric:         Mood and Affect: Mood normal.         Behavior: Behavior normal.         Thought Content: Thought content normal.         Judgment: Judgment normal.        Result Review :                Assessment and Plan   Diagnoses and all orders for this visit:    1. Urinary incontinence, nocturnal enuresis (Primary)  -     Urinalysis With Culture If Indicated -    2. Generalized anxiety disorder    3. Other insomnia  -     LORazepam (Ativan) 0.5 MG tablet; Take 1/2 tablet at night as needed  Dispense: 90 tablet; Refill: 0    4. Depression with anxiety  -     LORazepam (Ativan) 0.5 MG tablet; Take 1/2 tablet at night as needed  Dispense: 90 tablet; Refill: 0    5. Overactive bladder    6. Pelvic floor dysfunction    7. Elevated fasting glucose    8. Benign essential hypertension             Assessment & Plan  1. Hypertension.  Her hypertension is currently managed with amlodipine 10 mg daily. Blood pressure control appears stable. She should continue her current medication regimen.    2. Gastroesophageal Reflux Disease (GERD).  GERD is managed with  Nexium 40 mg daily. She should continue her current medication regimen.    3. Anxiety.  Anxiety is managed with Remeron 15 mg at night. She also has lorazepam 1 mg tablets on hand for anxiety, which she uses  1/2 tab every night. The dosage and frequency of lorazepam use have dramatically decreased since starting Remeron. She was advised to gradually reduce her lorazepam dosage, possibly to a quarter tablet or even discontinuing it for a few nights to observe if the urge to urinate awakens her. A prescription refill for lorazepam was provided.    4. Insomnia.  Insomnia is managed with Remeron 15 mg at night. She was advised to gradually reduce her lorazepam dosage, possibly to a quarter tablet or even discontinuing it for a few nights to observe if the urge to urinate awakens her. A prescription refill for lorazepam was provided.  Cognitive side effects discussed about lorazepam.  Will decrease her lorazepam milligram from 1 mg tablets to 0.5 mg tablets.  She will then half the 0.5 mg tablets which would give her 0.25 mg at night as needed for sleep.  She can then slowly wean off of that if she still has the urinary incontinence.    5. Nocturnal Incontinence.  The patient's nocturnal incontinence could be a side effect of lorazepam, which induces deep sleep. The potential cognitive impact of lorazepam was discussed with her. Her previous bladder prolapse surgery may also contribute to the current condition. A urinalysis will be conducted today to rule out infection. She was advised to limit her alcohol consumption, particularly on nights when she takes medication, and to restrict caffeine and liquid intake in the late afternoon and evening. Timed voiding every 1.5 hours was suggested to help manage her overactive bladder. If the urinalysis results are normal, discontinuation of lorazepam may be considered. Myrbetriq could be an alternative medication option.    6. Health Maintenance.  She received her flu and COVID  vaccines yesterday.            Follow Up   Return in about 6 months (around 3/11/2025) for Recheck.  Patient was given instructions and counseling regarding her condition or for health maintenance advice. Please see specific information pulled into the AVS if appropriate.         Patient or patient representative verbalized consent for the use of Ambient Listening during the visit with  Maria G Call MD for chart documentation. 9/11/2024  10:55 EDT    Maria G Call MD   10:54 EDT   [unfilled]

## 2024-09-13 ENCOUNTER — TELEPHONE (OUTPATIENT)
Dept: FAMILY MEDICINE CLINIC | Facility: CLINIC | Age: 89
End: 2024-09-13

## 2024-09-15 LAB
APPEARANCE UR: CLEAR
BACTERIA #/AREA URNS HPF: NORMAL /[HPF]
BACTERIA UR CULT: ABNORMAL
BACTERIA UR CULT: ABNORMAL
BILIRUB UR QL STRIP: NEGATIVE
CASTS URNS QL MICRO: NORMAL /LPF
COLOR UR: YELLOW
EPI CELLS #/AREA URNS HPF: NORMAL /HPF (ref 0–10)
GLUCOSE UR QL STRIP: NEGATIVE
HGB UR QL STRIP: NEGATIVE
KETONES UR QL STRIP: NEGATIVE
LEUKOCYTE ESTERASE UR QL STRIP: ABNORMAL
MICRO URNS: ABNORMAL
NITRITE UR QL STRIP: NEGATIVE
OTHER ANTIBIOTIC SUSC ISLT: ABNORMAL
PH UR STRIP: 6 [PH] (ref 5–7.5)
PROT UR QL STRIP: ABNORMAL
RBC #/AREA URNS HPF: NORMAL /HPF (ref 0–2)
SP GR UR STRIP: 1.02 (ref 1–1.03)
URINALYSIS REFLEX: ABNORMAL
UROBILINOGEN UR STRIP-MCNC: 0.2 MG/DL (ref 0.2–1)
WBC #/AREA URNS HPF: NORMAL /HPF (ref 0–5)

## 2024-09-15 RX ORDER — NITROFURANTOIN 25; 75 MG/1; MG/1
100 CAPSULE ORAL 2 TIMES DAILY
Qty: 10 CAPSULE | Refills: 0 | Status: SHIPPED | OUTPATIENT
Start: 2024-09-15

## 2024-10-03 RX ORDER — MIRABEGRON 25 MG/1
25 TABLET, FILM COATED, EXTENDED RELEASE ORAL DAILY
Qty: 90 TABLET | Refills: 0 | Status: SHIPPED | OUTPATIENT
Start: 2024-10-03

## 2024-10-29 RX ORDER — ESOMEPRAZOLE MAGNESIUM 40 MG/1
40 CAPSULE, DELAYED RELEASE ORAL DAILY
Qty: 90 CAPSULE | Refills: 3 | Status: SHIPPED | OUTPATIENT
Start: 2024-10-29

## 2024-12-02 DIAGNOSIS — G47.09 OTHER INSOMNIA: ICD-10-CM

## 2024-12-02 DIAGNOSIS — F41.8 DEPRESSION WITH ANXIETY: ICD-10-CM

## 2024-12-02 NOTE — TELEPHONE ENCOUNTER
Rx Refill Note  Requested Prescriptions     Pending Prescriptions Disp Refills    LORazepam (Ativan) 0.5 MG tablet 90 tablet 0     Sig: Take 1/2 tablet at night as needed      Last office visit with prescribing clinician: 9/11/2024   Last telemedicine visit with prescribing clinician: Visit date not found   Next office visit with prescribing clinician: 3/20/2025                         Would you like a call back once the refill request has been completed: [] Yes [] No    If the office needs to give you a call back, can they leave a voicemail: [] Yes [] No    Lam Fishman MA  12/02/24, 09:18 EST

## 2024-12-02 NOTE — TELEPHONE ENCOUNTER
Caller: Brittny Fay    Relationship: Self    Best call back number: [phone]     Requested Prescriptions:   Requested Prescriptions     Pending Prescriptions Disp Refills    LORazepam (Ativan) 0.5 MG tablet 90 tablet 0     Sig: Take 1/2 tablet at night as needed        Pharmacy where request should be sent: [id number] [address] - [phone] PH - [phone] FX     Last office visit with prescribing clinician: 9/11/2024   Last telemedicine visit with prescribing clinician: Visit date not found   Next office visit with prescribing clinician: 3/20/2025     Additional details provided by patient: PATIENT IS OUT OF THIS MEDICATION AND IS REQUESTING A REFILL TO BE SENT TO PHARMACY    Does the patient have less than a 3 day supply:  [x] Yes  [] No    Would you like a call back once the refill request has been completed: [] Yes [x] No    If the office needs to give you a call back, can they leave a voicemail: [x] Yes [] No    Michael Eastman Rep   12/02/24 08:30 EST

## 2024-12-06 RX ORDER — LORAZEPAM 0.5 MG/1
TABLET ORAL
Qty: 90 TABLET | Refills: 0 | Status: SHIPPED | OUTPATIENT
Start: 2024-12-06

## 2024-12-22 ENCOUNTER — HOSPITAL ENCOUNTER (OUTPATIENT)
Facility: HOSPITAL | Age: 89
Discharge: HOME | End: 2024-12-22
Attending: EMERGENCY MEDICINE | Admitting: EMERGENCY MEDICINE
Payer: MEDICARE

## 2024-12-22 VITALS
SYSTOLIC BLOOD PRESSURE: 127 MMHG | OXYGEN SATURATION: 95 % | TEMPERATURE: 98 F | BODY MASS INDEX: 21 KG/M2 | RESPIRATION RATE: 16 BRPM | DIASTOLIC BLOOD PRESSURE: 72 MMHG | HEART RATE: 110 BPM | WEIGHT: 123 LBS | HEIGHT: 64 IN

## 2024-12-22 DIAGNOSIS — J02.9 PHARYNGITIS, UNSPECIFIED ETIOLOGY: Primary | ICD-10-CM

## 2024-12-22 LAB
FLUAV SUBTYP SPEC NAA+PROBE: NOT DETECTED
FLUBV RNA ISLT QL NAA+PROBE: NOT DETECTED
RSV RNA NPH QL NAA+NON-PROBE: NOT DETECTED
SARS-COV-2 RNA RESP QL NAA+PROBE: NOT DETECTED

## 2024-12-22 PROCEDURE — 87637 SARSCOV2&INF A&B&RSV AMP PRB: CPT | Performed by: EMERGENCY MEDICINE

## 2024-12-22 PROCEDURE — G0463 HOSPITAL OUTPT CLINIC VISIT: HCPCS | Performed by: EMERGENCY MEDICINE

## 2024-12-22 RX ORDER — AMOXICILLIN 500 MG/1
500 CAPSULE ORAL 2 TIMES DAILY
Qty: 20 CAPSULE | Refills: 0 | Status: SHIPPED | OUTPATIENT
Start: 2024-12-22 | End: 2025-01-01

## 2024-12-22 RX ORDER — AMOXICILLIN 250 MG/1
500 CAPSULE ORAL ONCE
Status: COMPLETED | OUTPATIENT
Start: 2024-12-22 | End: 2024-12-22

## 2024-12-22 RX ADMIN — AMOXICILLIN 500 MG: 250 CAPSULE ORAL at 18:21

## 2024-12-22 NOTE — DISCHARGE INSTRUCTIONS
Today your swabs for COVID influenza RSV are negative.  I am going to treat you with amoxicillin twice daily for 10 days for pharyngitis.    You did receive your first dose here tonight    I did send your medicine and otherwise electronically.    Return anytime for increasing pain, swelling, any other difficulties    Please read all of the instructions in this handout.  If you receive prescriptions please fill them and take them as directed.  Please call your primary care physician for follow-up appointment in the next 5 to 7 days.  If you do not have a physician you may call the [facility] referral line at [phone].    You may return to the emergency department at any time for any concerns such as worsening symptoms.  If you received a work or school note it will be printed at the back of this packet.

## 2024-12-22 NOTE — FSED PROVIDER NOTE
EMERGENCY DEPARTMENT ENCOUNTER    Room Number:  11/11  Date seen:  12/22/2024  Time seen: 17:55 EST  PCP: Maria G Call MD  Historian:     Discussed/obtained information from independent historians:     HPI:    The patient is a very nice 89-year-old female.  She presents with a 2-day history of sore throat, nasal congestion.  She reports a fever with temperature max of 101.7.  No known contacts with COVID or influenza but she does state that [place].  No treatment prior to arrival.  No significant production of sputum    External (non-ED) record review:        Chronic or social conditions impacting care:    ALLERGIES  Cephalosporins, Amitriptyline, and Bupropion    PAST MEDICAL HISTORY  Active Ambulatory Problems     Diagnosis Date Noted    Depression with anxiety 02/01/2016    Benign essential hypertension 02/01/2016    Overactive bladder 02/01/2016    Degeneration of intervertebral disc of cervical region 02/01/2016    Irritable bowel syndrome with diarrhea 02/01/2016    Gastroesophageal reflux disease 02/01/2016    Seasonal allergic rhinitis 02/01/2016    Elevated fasting glucose 02/01/2016    Osteopenia 02/01/2016    Non-toxic multinodular goiter 02/01/2016    Insomnia 02/01/2016    Hyperplastic adenomatous polyp of stomach 02/01/2016    Hiatal hernia 02/01/2016    Onychocryptosis 10/03/2016    Acute UTI 10/03/2016    Bladder prolapse, female, acquired 02/06/2017    Renal cyst, left 02/06/2017    History of resection of large bowel 11/07/2017    Pelvic floor dysfunction 12/05/2017    Intestinal metaplasia of gastric cardia 04/24/2018    Abreu's esophagus with dysplasia 05/03/2018    SBO (small bowel obstruction) 05/30/2019    History of Abreu's esophagus 02/04/2021    Atypical chest pain 10/08/2021    Absence of bladder continence 12/07/2012    Encounter for fitting and adjustment of other specified devices 07/31/2013    Female stress incontinence 12/24/2016     Generalized abdominal pain 04/09/2014    HTN (hypertension) 04/01/2013    Incisional hernia 09/09/2015    Infectious enteritis 09/16/2017    Pain in limb 10/19/2021    Pain in right foot 10/19/2021    Urge incontinence 12/24/2016    Uterovaginal prolapse, incomplete 12/07/2012    Vaginitis and vulvovaginitis 01/07/2013    History of dizziness 03/08/2024     Resolved Ambulatory Problems     Diagnosis Date Noted    Diastolic dysfunction 02/01/2016    Diarrhea 11/07/2017    Family history of digestive disorder 11/07/2017     Past Medical History:   Diagnosis Date    Abdominal pain, epigastric     Allergic     Anxiety     Abreu esophagus     Chest pain     Cholelithiasis     Chronic interstitial cystitis     Colon polyps 09/23/2015    Degeneration of cervical intervertebral disc     Depression     Diverticulitis 2008    Diverticulitis of colon     Diverticulosis of colon     Gastritis     GERD (gastroesophageal reflux disease)     Hypertension     IBS (irritable bowel syndrome)     Obstructive sleep apnea     Osteoarthritis     Peptic ulceration     Reflux esophagitis     Solitary thyroid nodule     Urinary tract infection        PAST SURGICAL HISTORY  Past Surgical History:   Procedure Laterality Date    CATARACT EXTRACTION Bilateral     COLON RESECTION N/A 03/04/2008    Sigmoid colon resection with low pelvic anastomosis, right [id number]. Stepehn Kelty, [facility]    COLON RESECTION  2019    small bowel resection    COLONOSCOPY      CYSTOCELE REPAIR N/A 06/2017    ENDOSCOPY N/A 04/02/2018    Procedure: ESOPHAGOGASTRODUODENOSCOPY WITH COLD BIOPSIES;  Surgeon: Josiah Weber MD;  Location: [facility] ENDOSCOPY;  Service: Gastroenterology    ENDOSCOPY N/A 03/15/2021    Procedure: ESOPHAGOGASTRODUODENOSCOPY;  Surgeon: Julio Young MD;  Location: [facility] ENDOSCOPY;  Service: Gastroenterology;  Laterality: N/A;  PRE- BARRETTS ESOPHAGUS  POST- HIATAL HERNIA    ENDOSCOPY AND COLONOSCOPY N/A 04/29/2014     Gastritis: biopsied, esophagitis: biopsied, normal colonoscopy-Dr. Darren Lancaster, [facility]    EXPLORATORY LAPAROTOMY N/A 2019    Procedure: exploratory laparotomy;  Surgeon: Silvano Alarcon MD;  Location: [facility];  Service: General    LAPAROSCOPIC CHOLECYSTECTOMY N/A 2010    Dr. Darren Lancaster, [facility]    SMALL INTESTINE SURGERY N/A     emergent due to SBO    UMBILICAL HERNIA REPAIR      VAGINAL PROLAPSE REPAIR N/A     ALL PELVIC ORGAN PROLAPSE SURGERY    VENTRAL HERNIA REPAIR N/A 2015    Repair of complex ventral incisional hernia with mesh-Dr. Darren Lancaster, [facility]       FAMILY HISTORY  Family History   Problem Relation Age of Onset    Cancer Mother         Breast cancer    Breast cancer Mother     Hypertension Father     Stroke Father     Hypertension Brother     Cancer Maternal Grandmother         breast    Hypertension Brother     Malig Hyperthermia Neg Hx        SOCIAL HISTORY  Social History     Socioeconomic History    Marital status:    Tobacco Use    Smoking status: Former     Current packs/day: 0.00     Average packs/day: 0.5 packs/day for 20.0 years (10.0 ttl pk-yrs)     Types: Cigarettes     Start date: 1999     Quit date: 2019     Years since quittin.4     Passive exposure: Past    Smokeless tobacco: Never    Tobacco comments:     quit 1970   Vaping Use    Vaping status: Never Used   Substance and Sexual Activity    Alcohol use: Yes     Alcohol/week: 2.0 standard drinks of alcohol     Types: 2 Glasses of wine per week     Comment: socially     Drug use: No    Sexual activity: Not Currently     Partners: Male       REVIEW OF SYSTEMS  Review of Systems    All systems reviewed and negative except for those discussed in HPI.       PHYSICAL EXAM    I have reviewed the triage vital signs and nursing notes.  Vitals:    24 1736   BP: 127/72   Pulse:    Resp: 16   Temp:    SpO2:      Physical Exam  Vital signs: Reviewed in nurses notes    General: Patient is  awake alert no acute distress    HEENT: Normocephalic atraumatic nasopharynx slightly congested.  Oropharynx is moderately erythematous with some mild posterior soft tissue swelling.  No peritonsillar abscess noted.  Airway is completely intact    Neck:   Supple without lymphadenopathy    Respiratory:   Nonlabored respirations.  Clear to auscultation bilaterally.  Equal breath sounds bilaterally.  No wheezes or stridor noted.    Cardiovascular: Regular rate and rhythm.  No murmur.  Equal pulses in bilateral lower extremities without edema.    Abdomen: Nondistended    Skin:   Warm and dry.  No rashes noted    Neurological examination: Patient is awake alert oriented x4.  Speech is normal.  No facial palsy.  No focal motor or sensory deficits.    LAB RESULTS  Recent Results (from the past 24 hours)   COVID-19 and FLU A/B PCR, 1 HR TAT - Swab, Nasopharynx    Collection Time: 12/22/24  5:39 PM    Specimen: Nasopharynx; Swab   Result Value Ref Range    COVID19 Not Detected Not Detected - Ref. Range    Influenza A PCR Not Detected Not Detected    Influenza B PCR Not Detected Not Detected   RSV PCR - Swab, Nasopharynx    Collection Time: 12/22/24  5:39 PM    Specimen: Nasopharynx; Swab   Result Value Ref Range    RSV, PCR Not Detected Not Detected       Ordered the above labs and independently interpreted results.  My findings will be discussed in the ED course or medical decision making section below      PROCEDURES:  Procedures      RADIOLOGY RESULTS  No Radiology Exams Resulted Within Past 24 Hours     Ordered the above noted radiological studies.  Independently interpreted by me.  My findings will be discussed in the medical decision section below.     PROGRESS, DATA ANALYSIS, CONSULTS AND MEDICAL DECISION MAKING    Please note that this section constitutes my independent interpretation of clinical data including lab results, radiology, EKG's.  This constitutes my independent professional opinion regarding differential  diagnosis and management of this patient.  It may include any factors such as history from outside sources, review of external records, social determinants of health, management of medications, response to those treatments, and discussions with other providers.       Orders placed during this visit:  Orders Placed This Encounter   Procedures    COVID-19 and FLU A/B PCR, 1 HR TAT - Swab, Nasopharynx    RSV PCR - Swab, Nasopharynx       Medications   amoxicillin (AMOXIL) capsule 500 mg (has no administration in time range)            Medical Decision Making          DIAGNOSIS  Final diagnoses:   Pharyngitis, unspecified etiology          Medication List        New Prescriptions      amoxicillin 500 MG capsule  Commonly known as: AMOXIL  Take 1 capsule by mouth 2 (Two) Times a Day for 10 days.               Where to Get Your Medications        These medications were sent to [id number] - [place], [place] - [address] - [phone]  - [phone] FX  [address], [place] [address]      Phone: [phone]   amoxicillin 500 MG capsule         FOLLOW-UP  Maria G Call MD  [address]  [address]  [facility] [id number] [phone]    In 1 week          Latest Documented Vital Signs:  As of 18:12 EST  BP- 127/72 HR- 110 Temp- 98 °F (36.7 °C) (Temporal) O2 sat- 95%    Appropriate PPE utilized throughout this patient encounter to include mask, if indicated, per current protocol. Hand hygiene was performed before donning PPE and after removal when leaving the room.    Please note that portions of this were completed with a voice recognition program.     Note Disclaimer: At [facility], we believe that sharing information builds trust and better relationships. You are receiving this note because you are receiving care at [facility] or recently visited. It is possible you will see health information before a provider has  talked with you about it. This kind of information can be easy to misunderstand. To help you fully understand what it means for your health, we urge you to discuss this note with your provider.

## 2025-01-03 DIAGNOSIS — I10 BENIGN ESSENTIAL HYPERTENSION: ICD-10-CM

## 2025-01-03 RX ORDER — AMLODIPINE BESYLATE 10 MG/1
10 TABLET ORAL DAILY
Qty: 90 TABLET | Refills: 1 | Status: SHIPPED | OUTPATIENT
Start: 2025-01-03

## 2025-02-18 DIAGNOSIS — F41.1 GENERALIZED ANXIETY DISORDER: ICD-10-CM

## 2025-02-18 RX ORDER — MIRTAZAPINE 15 MG/1
15 TABLET, FILM COATED ORAL NIGHTLY
Qty: 90 TABLET | Refills: 3 | Status: SHIPPED | OUTPATIENT
Start: 2025-02-18

## 2025-02-28 DIAGNOSIS — G47.09 OTHER INSOMNIA: ICD-10-CM

## 2025-02-28 DIAGNOSIS — F41.8 DEPRESSION WITH ANXIETY: ICD-10-CM

## 2025-02-28 RX ORDER — LORAZEPAM 0.5 MG/1
TABLET ORAL
Qty: 90 TABLET | Refills: 0 | Status: CANCELLED | OUTPATIENT
Start: 2025-02-28

## 2025-02-28 RX ORDER — LORAZEPAM 0.5 MG/1
TABLET ORAL
Qty: 15 TABLET | Refills: 3 | Status: SHIPPED | OUTPATIENT
Start: 2025-02-28

## 2025-02-28 NOTE — TELEPHONE ENCOUNTER
Rx Refill Note  Requested Prescriptions     Pending Prescriptions Disp Refills    LORazepam (Ativan) 0.5 MG tablet 90 tablet 0     Sig: Take 1/2 tablet at night as needed      Last office visit with prescribing clinician: 9/11/2024   Last telemedicine visit with prescribing clinician: Visit date not found   Next office visit with prescribing clinician: 3/20/2025                         Would you like a call back once the refill request has been completed: [] Yes [] No    If the office needs to give you a call back, can they leave a voicemail: [] Yes [] No    Josiah Rivas MA  02/28/25, 08:45 EST      PT IS SUPPOSE TO TAKE 1/2 TABLET AT NIGHT AND SHE WAS GIVEN #90 LESS THAN 3 MONTHS AGO.  THEREFORE, HER PRESCRIPTION SHOULD LAST  DAYS AND IT IS TOO SOON FOR REFILL.  PLEASE ADVISE HOW SHE IS TAKING THIS MEDICATION?

## 2025-02-28 NOTE — TELEPHONE ENCOUNTER
Caller: Brittny Fay JOVANI    Relationship: Self    Best call back number: [phone]     Requested Prescriptions:   Requested Prescriptions     Pending Prescriptions Disp Refills    LORazepam (Ativan) 0.5 MG tablet 90 tablet 0     Sig: Take 1/2 tablet at night as needed        Pharmacy where request should be sent: [facility] - [place] - [address] AT [address]AL TO [facility] [facility] - [phone]  - [phone]      Last office visit with prescribing clinician: 9/11/2024   Last telemedicine visit with prescribing clinician: Visit date not found   Next office visit with prescribing clinician: 3/20/2025       Would you like a call back once the refill request has been completed: [x] Yes [] No    If the office needs to give you a call back, can they leave a voicemail: [x] Yes [] No    Michael Morton Rep   02/28/25 08:16 EST

## 2025-03-11 ENCOUNTER — TELEPHONE (OUTPATIENT)
Dept: FAMILY MEDICINE CLINIC | Facility: CLINIC | Age: 89
End: 2025-03-11
Payer: MEDICARE

## 2025-03-11 DIAGNOSIS — Z13.29 SCREENING FOR THYROID DISORDER: ICD-10-CM

## 2025-03-11 DIAGNOSIS — E53.8 B12 DEFICIENCY: ICD-10-CM

## 2025-03-11 DIAGNOSIS — I10 HYPERTENSION, UNSPECIFIED TYPE: ICD-10-CM

## 2025-03-11 DIAGNOSIS — R73.01 ELEVATED FASTING GLUCOSE: ICD-10-CM

## 2025-03-11 DIAGNOSIS — I10 BENIGN ESSENTIAL HYPERTENSION: Primary | ICD-10-CM

## 2025-03-14 ENCOUNTER — OFFICE VISIT (OUTPATIENT)
Dept: GASTROENTEROLOGY | Facility: CLINIC | Age: 89
End: 2025-03-14
Payer: MEDICARE

## 2025-03-14 VITALS
BODY MASS INDEX: 21.27 KG/M2 | HEIGHT: 64 IN | DIASTOLIC BLOOD PRESSURE: 77 MMHG | WEIGHT: 124.6 LBS | HEART RATE: 78 BPM | TEMPERATURE: 97.8 F | SYSTOLIC BLOOD PRESSURE: 113 MMHG

## 2025-03-14 DIAGNOSIS — K58.8 OTHER IRRITABLE BOWEL SYNDROME: ICD-10-CM

## 2025-03-14 DIAGNOSIS — K21.9 GASTROESOPHAGEAL REFLUX DISEASE, UNSPECIFIED WHETHER ESOPHAGITIS PRESENT: ICD-10-CM

## 2025-03-14 DIAGNOSIS — R05.3 CHRONIC COUGH: Primary | ICD-10-CM

## 2025-03-14 DIAGNOSIS — Z87.19 HISTORY OF BARRETT'S ESOPHAGUS: ICD-10-CM

## 2025-03-14 PROCEDURE — 99214 OFFICE O/P EST MOD 30 MIN: CPT | Performed by: NURSE PRACTITIONER

## 2025-03-14 PROCEDURE — 1159F MED LIST DOCD IN RCRD: CPT | Performed by: NURSE PRACTITIONER

## 2025-03-14 PROCEDURE — 1160F RVW MEDS BY RX/DR IN RCRD: CPT | Performed by: NURSE PRACTITIONER

## 2025-03-14 RX ORDER — SENNOSIDES A AND B 8.6 MG/1
1 TABLET, FILM COATED ORAL DAILY
Qty: 90 TABLET | Refills: 3 | Status: SHIPPED | OUTPATIENT
Start: 2025-03-14

## 2025-03-14 NOTE — PROGRESS NOTES
Chief Complaint   Patient presents with    Gastroesophageal reflux disease, unspecified whether esopha       HPI    Brittny Fay is a  89 y.o. female here for a follow up visit for GERD.    This is a former patient of Dr. Young's, known to me.    Past GI medical history of diverticulitis, irritable bowel syndrome, Abreu's esophagus, and small bowel obstruction in 2019 requiring laparotomy.     On visit today Brittny reports some changes in vocal quality and intermittent cough ongoing for many years but recently started to wonder if this was secondary to acid reflux.  She gets rare breakthrough despite daily Nexium.  She generally follows an antireflux diet.  Denies nausea, vomiting, dysphagia or odynophagia.  Her appetite is good.  Weight is stable.    Bowels move every day occasional incomplete evacuation and firm stools.  Denies diarrhea, rectal bleeding or rectal pain.  She has tried fiber with no improvement.  She has not tried stool softeners.    Recent hemoglobin, LFTs and TSH normal.    Past Medical History:   Diagnosis Date    Abdominal pain, epigastric     Allergic     Anxiety     Abreu esophagus     not confirmed    Bladder prolapse, female, acquired     Chest pain     Cholelithiasis     Chronic interstitial cystitis     Colon polyps 09/23/2015    Degeneration of cervical intervertebral disc     Depression     Diverticulitis 2008    Diverticulitis of colon     Diverticulosis of colon     Gastritis     GERD (gastroesophageal reflux disease)     Hiatal hernia     Hypertension     IBS (irritable bowel syndrome)     Insomnia     Obstructive sleep apnea     Osteoarthritis     Peptic ulceration     Reflux esophagitis     Solitary thyroid nodule     Urinary tract infection        Past Surgical History:   Procedure Laterality Date    CATARACT EXTRACTION Bilateral     COLON RESECTION N/A 03/04/2008    Sigmoid colon resection with low pelvic anastomosis, right [id number]. Stepehn Kelty, [facility]     COLON RESECTION  2019    small bowel resection    COLONOSCOPY      CYSTOCELE REPAIR N/A 2017    ENDOSCOPY N/A 2018    Procedure: ESOPHAGOGASTRODUODENOSCOPY WITH COLD BIOPSIES;  Surgeon: Josiah Weber MD;  Location: [facility] ENDOSCOPY;  Service: Gastroenterology    ENDOSCOPY N/A 03/15/2021    Procedure: ESOPHAGOGASTRODUODENOSCOPY;  Surgeon: Julio Young MD;  [facility] END[facility];  Service: Gastroenterology;  Laterality: N/A;  PRE- BARRETTS ESOPHAGUS  POST- HIATAL HERNIA    ENDOSCOPY AND COLONOSCOPY N/A 2014    Gastritis: biopsied, esophagitis: biopsied, normal colonoscopy-Dr. Darren Lancaster, [facility]    EXPLORATORY LAPAROTOMY N/A 2019    Procedure: exploratory laparotomy;  Surgeon: Silvano Alarcon MD;  Location: [facility] MAIN OR;  Service: General    LAPAROSCOPIC CHOLECYSTECTOMY N/A 2010    Dr. Darren Lancaster, [facility]    SMALL INTESTINE SURGERY N/A     emergent due to SBO    UMBILICAL HERNIA REPAIR      VAGINAL PROLAPSE REPAIR N/A     ALL PELVIC ORGAN PROLAPSE SURGERY    VENTRAL HERNIA REPAIR N/A 2015    Repair of complex ventral incisional hernia with mesh-Dr. Darren Lancaster, [facility]       Scheduled Meds:     Continuous Infusions: No current facility-administered medications for this visit.      PRN Meds:     Allergies   Allergen Reactions    Cephalosporins Swelling    Amitriptyline Other (See Comments)     Other reaction(s): alertness/ hyperness    Bupropion Other (See Comments)     Other reaction(s): insomnia       Social History     Socioeconomic History    Marital status:    Tobacco Use    Smoking status: Former     Current packs/day: 0.00     Average packs/day: 0.5 packs/day for 20.0 years (10.0 ttl pk-yrs)     Types: Cigarettes     Start date: 1999     Quit date: 2019     Years since quittin.6     Passive exposure: Past    Smokeless tobacco: Never    Tobacco comments:     quit 1970   Vaping Use    Vaping status: Never Used   Substance and Sexual  Activity    Alcohol use: Yes     Alcohol/week: 2.0 standard drinks of alcohol     Types: 2 Glasses of wine per week     Comment: socially     Drug use: No    Sexual activity: Not Currently     Partners: Male       Family History   Problem Relation Age of Onset    Cancer Mother         Breast cancer    Breast cancer Mother     Hypertension Father     Stroke Father     Hypertension Brother     Cancer Maternal Grandmother         breast    Hypertension Brother     Malig Hyperthermia Neg Hx        Review of Systems   Gastrointestinal:  Negative for abdominal distention, abdominal pain, anal bleeding, blood in stool, constipation and diarrhea.       Vitals:    03/14/25 0953   BP: 113/77   Pulse: 78   Temp: 97.8 °F (36.6 °C)       Physical Exam  Constitutional:       Appearance: She is well-developed.   Abdominal:      General: Bowel sounds are normal. There is no distension.      Palpations: Abdomen is soft. There is no mass.      Tenderness: There is no abdominal tenderness. There is no guarding.      Hernia: No hernia is present.   Skin:     General: Skin is warm and dry.      Capillary Refill: Capillary refill takes less than 2 seconds.   Neurological:      Mental Status: She is alert and oriented to person, place, and time.   Psychiatric:         Behavior: Behavior normal.     Assessment    Diagnoses and all orders for this visit:    1. Chronic cough (Primary)  -     FL ESOPHAGRAM SINGLE CONTRAST; Future    2. Gastroesophageal reflux disease, unspecified whether esophagitis present  -     FL ESOPHAGRAM SINGLE CONTRAST; Future    3. History of Abreu's esophagus  Overview:  Added automatically from request for surgery [id number]    Orders:  -     FL ESOPHAGRAM SINGLE CONTRAST; Future    4. Other irritable bowel syndrome    Other orders  -     senna (Senokot) 8.6 MG tablet; Take 1 tablet by mouth Daily.  Dispense: 90 tablet; Refill: 3       Plan    Schedule esophagram  Continue PPI therapy  Antireflux measures and  dietary modifications reviewed. Low acid diet reviewed. Keep head of bed elevated. Stop eating/drinking at least 3 hours prior to bedtime. Eliminate caffeine and carbonated beverages.    Trial stool softeners-Senokot 1 tablet daily to improve bowel pattern  Further recommendations and follow-up pending imaging         SYDNI Mustafa  [facility]  [address] [place] [address]  Office: [phone]

## 2025-03-20 ENCOUNTER — OFFICE VISIT (OUTPATIENT)
Dept: FAMILY MEDICINE CLINIC | Facility: CLINIC | Age: 89
End: 2025-03-20
Payer: MEDICARE

## 2025-03-20 VITALS
BODY MASS INDEX: 21.51 KG/M2 | HEIGHT: 64 IN | OXYGEN SATURATION: 99 % | WEIGHT: 126 LBS | HEART RATE: 73 BPM | SYSTOLIC BLOOD PRESSURE: 142 MMHG | DIASTOLIC BLOOD PRESSURE: 83 MMHG

## 2025-03-20 DIAGNOSIS — I10 BENIGN ESSENTIAL HYPERTENSION: Primary | ICD-10-CM

## 2025-03-20 DIAGNOSIS — Z87.19 HISTORY OF BARRETT'S ESOPHAGUS: ICD-10-CM

## 2025-03-20 DIAGNOSIS — R73.01 ELEVATED FASTING GLUCOSE: ICD-10-CM

## 2025-03-20 DIAGNOSIS — K21.9 GASTROESOPHAGEAL REFLUX DISEASE, UNSPECIFIED WHETHER ESOPHAGITIS PRESENT: ICD-10-CM

## 2025-03-20 NOTE — PROGRESS NOTES
"Chief Complaint  Follow-up (6 mo)    Patient or patient representative verbalized consent for the use of Ambient Listening during the visit with  Maria G Call MD for chart documentation. 3/20/2025  10:35 EDT    Subjective        Brittny Fay presents to [facility]    History of Present Illness  The patient presents for a 6-month follow-up visit on HTN, GERD and Insomnia and anxiety    She reports experiencing oxygen-related issues this morning, although her current oxygen saturation level is at 99 percent. She has discontinued the use of Myrbetriq for her bladder condition due to its ineffectiveness and is not currently on any bladder medication. She has also ceased taking magnesium supplements but thinks she will restart it due to constipation issues at times. She continues to take mirtazapine at night for sleep and anxiety management, along with lorazepam 0.25 mg. Her current regimen includes amlodipine 10 mg for hypertension and Nexium 40 mg for acid indigestion and reflux symptoms. She has reduced her vitamin B12 dosage from 1000 to 500. She expresses concern about her A1c levels due to her fondness for sweets. She is overdue for a dermatology consultation and has been attempting to treat what she believes to be blackheads with an acid-type medication, but without success.    She is experiencing increased constipation since stopping Mg.    MEDICATIONS  Discontinued: Myrbetriq, magnesium  Current: mirtazapine, lorazepam, amlodipine, Nexium    Hemoglobin A1c (03/13/2025 09:19)  TSH+Free T4 (03/13/2025 09:19)  Comprehensive Metabolic Panel (03/13/2025 09:19)  CBC & Differential (03/13/2025 09:19)  Vitamin B12 (03/13/2025 09:19)  Objective   Vital Signs:  /83 (BP Location: Left arm, Patient Position: Sitting, Cuff Size: Adult)   Pulse 73   Ht 162.6 cm (64.02\")   Wt 57.2 kg (126 lb)   SpO2 99%   BMI 21.62 kg/m²   Estimated body mass index is 21.62 kg/m² as calculated " "from the following:    Height as of this encounter: 162.6 cm (64.02\").    Weight as of this encounter: 57.2 kg (126 lb).    BMI is within normal parameters. No other follow-up for BMI required.      Physical Exam  Vitals and nursing note reviewed.   Constitutional:       Appearance: Normal appearance. She is well-developed.   HENT:      Head: Normocephalic and atraumatic.      Right Ear: Tympanic membrane, ear canal and external ear normal.      Left Ear: Tympanic membrane, ear canal and external ear normal.      Mouth/Throat:      Mouth: Mucous membranes are moist.   Eyes:      Extraocular Movements: Extraocular movements intact.      Conjunctiva/sclera: Conjunctivae normal.   Neck:      Vascular: No carotid bruit.   Cardiovascular:      Rate and Rhythm: Normal rate and regular rhythm.      Heart sounds: Normal heart sounds.      Comments: No bruits  Pulmonary:      Effort: Pulmonary effort is normal. No respiratory distress.      Breath sounds: Normal breath sounds. No stridor. No wheezing, rhonchi or rales.   Chest:      Chest wall: No tenderness.   Abdominal:      General: Bowel sounds are normal. There is no distension.      Palpations: Abdomen is soft. There is no mass.      Tenderness: There is no abdominal tenderness. There is no guarding or rebound.      Hernia: No hernia is present.   Musculoskeletal:      Cervical back: Neck supple.      Right lower leg: No edema.      Left lower leg: No edema.   Lymphadenopathy:      Cervical: No cervical adenopathy.   Skin:     General: Skin is warm.   Neurological:      General: No focal deficit present.      Mental Status: She is alert and oriented to person, place, and time. Mental status is at baseline.      Gait: Gait normal.   Psychiatric:         Mood and Affect: Mood normal.         Behavior: Behavior normal.         Thought Content: Thought content normal.         Judgment: Judgment normal.        Result Review :                   Assessment and Plan "   Diagnoses and all orders for this visit:    1. Benign essential hypertension (Primary)  -     Comprehensive Metabolic Panel; Future  -     CBC & Differential; Future  -     Lipid Panel With LDL / HDL Ratio; Future  -     TSH; Future  -     T4, Free; Future    2. Elevated fasting glucose  -     Comprehensive Metabolic Panel; Future  -     CBC & Differential; Future  -     Lipid Panel With LDL / HDL Ratio; Future  -     TSH; Future  -     T4, Free; Future  -     Hemoglobin A1c; Future    3. History of Abreu's esophagus    4. Gastroesophageal reflux disease, unspecified whether esophagitis present             Assessment & Plan  1. Health maintenance.  Her vitamin B12 levels are slightly elevated but within acceptable range. Complete blood count (CBC), blood glucose, renal function, electrolytes, liver function, thyroid function, and hemoglobin A1c are all within normal limits. She will continue her current vitamin B12 dosage of 500 mcg. A prescription for lorazepam 0.5 mg 1/2 tab qhs prn will be sent to [facility] via mail order once she is due for refill  Blood work has been ordered for a follow-up in 6 months.    2. Constipation.  She reports increased constipation since stopping magnesium. She is advised to resume magnesium supplementation as it helps with both sleep and constipation.    3. Bladder issues.  She has discontinued Myrbetriq as it was not effective. No new medication is prescribed for her bladder issues at this time.  Sx are tolerable    4. Hypertension.  She is currently taking amlodipine 10 mg for high blood pressure. She will continue this medication.    5. Gastroesophageal Reflux Disease (GERD) and H/O Barretts  She is taking Nexium 40 mg for acid indigestion and reflux symptoms. She will continue this medication as advised by her healthcare provider.    6. Anxiety and sleep issues.  She is taking mirtazapine at night for sleep and anxiety, and lorazepam 0.5 mg (half of 0.5 mg) at night. She  will continue these medications.    7. Dermatological concerns.  She is overdue for a dermatologist visit and has concerns about blackheads. She is advised to schedule an appointment with a dermatologist for further evaluation and treatment.         Follow Up   No follow-ups on file.  Patient was given instructions and counseling regarding her condition or for health maintenance advice. Please see specific information pulled into the AVS if appropriate.           Maria G Call MD   11:11 EDT   [unfilled]

## 2025-04-10 ENCOUNTER — HOSPITAL ENCOUNTER (OUTPATIENT)
Dept: GENERAL RADIOLOGY | Facility: HOSPITAL | Age: 89
Discharge: HOME | End: 2025-04-10
Admitting: NURSE PRACTITIONER
Payer: MEDICARE

## 2025-04-10 DIAGNOSIS — K21.9 GASTROESOPHAGEAL REFLUX DISEASE, UNSPECIFIED WHETHER ESOPHAGITIS PRESENT: ICD-10-CM

## 2025-04-10 DIAGNOSIS — Z87.19 HISTORY OF BARRETT'S ESOPHAGUS: ICD-10-CM

## 2025-04-10 DIAGNOSIS — R05.3 CHRONIC COUGH: ICD-10-CM

## 2025-04-10 PROCEDURE — 63710000001 BARIUM SULFATE 96 % RECONSTITUTED SUSPENSION: Performed by: NURSE PRACTITIONER

## 2025-04-10 PROCEDURE — 74221 X-RAY XM ESOPHAGUS 2CNTRST: CPT

## 2025-04-10 PROCEDURE — A9270 NON-COVERED ITEM OR SERVICE: HCPCS | Performed by: NURSE PRACTITIONER

## 2025-04-10 PROCEDURE — 63710000001 BARIUM SULFATE 98 % RECONSTITUTED SUSPENSION: Performed by: NURSE PRACTITIONER

## 2025-04-10 PROCEDURE — 63710000001 BARIUM SULFATE 700 MG TABLET: Performed by: NURSE PRACTITIONER

## 2025-04-10 PROCEDURE — 63710000001 SOD BICARB-CITRIC ACID-SIMETHICONE 2.21-1.53-0.04 G PACK: Performed by: NURSE PRACTITIONER

## 2025-04-10 RX ADMIN — ANTACID/ANTIFLATULENT 1 PACKET: 380; 550; 10; 10 GRANULE, EFFERVESCENT ORAL at 11:15

## 2025-04-10 RX ADMIN — BARIUM SULFATE 135 ML: 980 POWDER, FOR SUSPENSION ORAL at 11:15

## 2025-04-10 RX ADMIN — BARIUM SULFATE 700 MG: 700 TABLET ORAL at 11:14

## 2025-04-10 RX ADMIN — BARIUM SULFATE 183 ML: 960 POWDER, FOR SUSPENSION ORAL at 11:15

## 2025-04-15 ENCOUNTER — TELEPHONE (OUTPATIENT)
Dept: GASTROENTEROLOGY | Facility: CLINIC | Age: 89
End: 2025-04-15
Payer: MEDICARE

## 2025-04-15 NOTE — TELEPHONE ENCOUNTER
Hub staff attempted to follow warm transfer process and was unsuccessful     Caller: Brittny Fay    Relationship to patient: Self    Best call back number: [phone]      Patient is needing: PT CONCERNED ABOUT TEST RESULTS FROM TEST PLS CALL PT TO DISCUSS.         
Caller: [place] Brittny JOVANI    Relationship: Self    Best call back number: [phone]    Caller requesting test results: PATIENT    What test was performed: FL amna esophagram complete     When was the test performed: 4/10/25    Where was the test performed: [facility]     Additional notes: PT IS CONCERNED WITH THE RESULTS OF THIS TEST AND WOULD LIKE A CALL BACK TO DISCUSS WHAT SHE NEEDS TO DO    
Update to Kisha.   
none

## 2025-04-21 RX ORDER — PANTOPRAZOLE SODIUM 40 MG/1
40 TABLET, DELAYED RELEASE ORAL 2 TIMES DAILY
Qty: 180 TABLET | Refills: 3 | Status: SHIPPED | OUTPATIENT
Start: 2025-04-21

## 2025-04-21 NOTE — PROGRESS NOTES
I spoke with the patient over the phone regarding esophagram results.  We talked about transitioning her from Nexium which she has been on for greater than 5 years to twice daily dosing Protonix and monitoring for symptom improvement in the coming weeks.  She also admits she has been more compliant with antireflux diet since the study and has had some improvement in symptoms.  Given her age she wants to avoid endoscopic examinations and try conservative measures.  She is to keep me posted on symptom improvement in the coming weeks at which point we can determine follow-up and further recommendations if warranted.

## 2025-06-30 ENCOUNTER — TELEPHONE (OUTPATIENT)
Dept: FAMILY MEDICINE CLINIC | Facility: CLINIC | Age: 89
End: 2025-06-30

## 2025-06-30 NOTE — TELEPHONE ENCOUNTER
Caller: Brittny Fay    Relationship: Self    Best call back number: [phone]     What medication are you requesting: LORazepam (Ativan) 0.5 MG tablet     If a prescription is needed, what is your preferred pharmacy and phone number: [facility] - [address] [place] - [address] AT [address] TO [facility] - [phone]  - [phone]      Additional notes: PATIENT NEEDS PRESCRIPTION TO GO TO THE MAIL ORDER PHARMACY, AND NEEDS TO HAVE A 90 DAY SUPPLY FOR INSURANCE.

## 2025-07-01 DIAGNOSIS — G47.09 OTHER INSOMNIA: ICD-10-CM

## 2025-07-01 DIAGNOSIS — F41.8 DEPRESSION WITH ANXIETY: ICD-10-CM

## 2025-07-01 NOTE — TELEPHONE ENCOUNTER
Rx Refill Note  Requested Prescriptions     Pending Prescriptions Disp Refills    LORazepam (Ativan) 0.5 MG tablet 90 tablet 0     Sig: Take 1/2 tablet at night as needed      Last office visit with prescribing clinician: 3/20/2025   Last telemedicine visit with prescribing clinician: Visit date not found   Next office visit with prescribing clinician: 9/16/2025                         Would you like a call back once the refill request has been completed: [] Yes [] No    If the office needs to give you a call back, can they leave a voicemail: [] Yes [] No    Lam Fishman MA  07/01/25, 15:14 EDT

## 2025-07-02 DIAGNOSIS — G47.09 OTHER INSOMNIA: ICD-10-CM

## 2025-07-02 DIAGNOSIS — I10 BENIGN ESSENTIAL HYPERTENSION: ICD-10-CM

## 2025-07-02 DIAGNOSIS — F41.8 DEPRESSION WITH ANXIETY: ICD-10-CM

## 2025-07-02 RX ORDER — LORAZEPAM 0.5 MG/1
TABLET ORAL
Qty: 90 TABLET | Refills: 0 | Status: SHIPPED | OUTPATIENT
Start: 2025-07-02

## 2025-07-02 RX ORDER — AMLODIPINE BESYLATE 10 MG/1
10 TABLET ORAL DAILY
Qty: 90 TABLET | Refills: 1 | Status: SHIPPED | OUTPATIENT
Start: 2025-07-02

## 2025-07-02 RX ORDER — LORAZEPAM 0.5 MG/1
TABLET ORAL
Qty: 90 TABLET | Refills: 0 | OUTPATIENT
Start: 2025-07-02

## (undated) DEVICE — BITEBLOCK OMNI BLOC

## (undated) DEVICE — CANN NASL CO2 TRULINK W/O2 A/

## (undated) DEVICE — LN SMPL CO2 SHTRM SD STREAM W/M LUER

## (undated) DEVICE — Device: Brand: DEFENDO AIR/WATER/SUCTION AND BIOPSY VALVE

## (undated) DEVICE — PK PROC MAJ 40

## (undated) DEVICE — TUBING, SUCTION, 1/4" X 10', STRAIGHT: Brand: MEDLINE

## (undated) DEVICE — SUT SILK 3/0 SH CR5 18IN C0135

## (undated) DEVICE — ANTIBACTERIAL UNDYED BRAIDED (POLYGLACTIN 910), SYNTHETIC ABSORBABLE SUTURE: Brand: COATED VICRYL

## (undated) DEVICE — FRCP BX RADJAW4 NDL 2.8 240CM LG OG BX40

## (undated) DEVICE — SENSR O2 OXIMAX FNGR A/ 18IN NONSTR

## (undated) DEVICE — MSK ENDO PORT O2 POM ELITE CURAPLEX A/

## (undated) DEVICE — SUT VIC 2/0 TIES 18IN J111T

## (undated) DEVICE — GLV SURG BIOGEL LTX PF 8 1/2

## (undated) DEVICE — WOUND RETRACTOR AND PROTECTOR: Brand: ALEXIS WOUND PROTECTOR-RETRACTOR

## (undated) DEVICE — TBG 02 CRUSH RESIST LF CLR 7FT

## (undated) DEVICE — KT ORCA ORCAPOD DISP STRL

## (undated) DEVICE — TOWEL,OR,DSP,ST,BLUE,STD,4/PK,20PK/CS: Brand: MEDLINE

## (undated) DEVICE — APPL CHLORAPREP W/TINT 26ML ORNG

## (undated) DEVICE — ELECTRD BLD EXT EDGE/INSUL 6IN

## (undated) DEVICE — DRSNG WND BORDR/ADHS NONADHR/GZ LF 4X14IN STRL

## (undated) DEVICE — CANN O2 ETCO2 FITS ALL CONN CO2 SMPL A/ 7IN DISP LF

## (undated) DEVICE — ADAPT CLN BIOGUARD AIR/H2O DISP

## (undated) DEVICE — TOTAL TRAY, 16FR 10ML SIL FOLEY, URN: Brand: MEDLINE